# Patient Record
Sex: FEMALE | Race: WHITE | NOT HISPANIC OR LATINO | URBAN - METROPOLITAN AREA
[De-identification: names, ages, dates, MRNs, and addresses within clinical notes are randomized per-mention and may not be internally consistent; named-entity substitution may affect disease eponyms.]

---

## 2017-01-23 ENCOUNTER — EMERGENCY (EMERGENCY)
Facility: HOSPITAL | Age: 67
LOS: 0 days | Discharge: HOME | End: 2017-01-23
Admitting: INTERNAL MEDICINE

## 2017-06-21 ENCOUNTER — OUTPATIENT (OUTPATIENT)
Dept: OUTPATIENT SERVICES | Facility: HOSPITAL | Age: 67
LOS: 1 days | Discharge: HOME | End: 2017-06-21

## 2017-06-27 DIAGNOSIS — R07.89 OTHER CHEST PAIN: ICD-10-CM

## 2017-06-27 DIAGNOSIS — E78.5 HYPERLIPIDEMIA, UNSPECIFIED: ICD-10-CM

## 2017-06-27 DIAGNOSIS — I10 ESSENTIAL (PRIMARY) HYPERTENSION: ICD-10-CM

## 2017-06-27 DIAGNOSIS — E03.9 HYPOTHYROIDISM, UNSPECIFIED: ICD-10-CM

## 2017-06-27 DIAGNOSIS — R56.9 UNSPECIFIED CONVULSIONS: ICD-10-CM

## 2017-06-27 DIAGNOSIS — J45.909 UNSPECIFIED ASTHMA, UNCOMPLICATED: ICD-10-CM

## 2017-06-28 DIAGNOSIS — E04.9 NONTOXIC GOITER, UNSPECIFIED: ICD-10-CM

## 2017-07-15 ENCOUNTER — EMERGENCY (EMERGENCY)
Facility: HOSPITAL | Age: 67
LOS: 0 days | Discharge: HOME | End: 2017-07-15
Admitting: INTERNAL MEDICINE
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
## 2017-07-15 DIAGNOSIS — I10 ESSENTIAL (PRIMARY) HYPERTENSION: ICD-10-CM

## 2017-07-15 DIAGNOSIS — J45.909 UNSPECIFIED ASTHMA, UNCOMPLICATED: ICD-10-CM

## 2017-07-15 DIAGNOSIS — S61.216A LACERATION WITHOUT FOREIGN BODY OF RIGHT LITTLE FINGER WITHOUT DAMAGE TO NAIL, INITIAL ENCOUNTER: ICD-10-CM
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
## 2017-07-15 DIAGNOSIS — Z79.899 OTHER LONG TERM (CURRENT) DRUG THERAPY: ICD-10-CM

## 2019-05-29 ENCOUNTER — RECORD ABSTRACTING (OUTPATIENT)
Age: 69
End: 2019-05-29

## 2019-05-29 DIAGNOSIS — Z87.891 PERSONAL HISTORY OF NICOTINE DEPENDENCE: ICD-10-CM

## 2019-05-29 DIAGNOSIS — Z87.19 PERSONAL HISTORY OF OTHER DISEASES OF THE DIGESTIVE SYSTEM: ICD-10-CM

## 2019-05-29 DIAGNOSIS — Z87.898 PERSONAL HISTORY OF OTHER SPECIFIED CONDITIONS: ICD-10-CM

## 2019-05-29 DIAGNOSIS — Z82.3 FAMILY HISTORY OF STROKE: ICD-10-CM

## 2019-05-29 DIAGNOSIS — Z82.49 FAMILY HISTORY OF ISCHEMIC HEART DISEASE AND OTHER DISEASES OF THE CIRCULATORY SYSTEM: ICD-10-CM

## 2019-05-29 DIAGNOSIS — Z86.39 PERSONAL HISTORY OF OTHER ENDOCRINE, NUTRITIONAL AND METABOLIC DISEASE: ICD-10-CM

## 2019-05-29 DIAGNOSIS — Z83.49 FAMILY HISTORY OF OTHER ENDOCRINE, NUTRITIONAL AND METABOLIC DISEASES: ICD-10-CM

## 2019-05-29 DIAGNOSIS — Z87.39 PERSONAL HISTORY OF OTHER DISEASES OF THE MUSCULOSKELETAL SYSTEM AND CONNECTIVE TISSUE: ICD-10-CM

## 2019-05-29 PROBLEM — Z00.00 ENCOUNTER FOR PREVENTIVE HEALTH EXAMINATION: Status: ACTIVE | Noted: 2019-05-29

## 2019-05-29 RX ORDER — CHROMIUM 200 MCG
1000 TABLET ORAL
Refills: 0 | Status: ACTIVE | COMMUNITY

## 2019-06-08 ENCOUNTER — APPOINTMENT (OUTPATIENT)
Dept: ENDOCRINOLOGY | Facility: CLINIC | Age: 69
End: 2019-06-08
Payer: MEDICARE

## 2019-06-08 VITALS
DIASTOLIC BLOOD PRESSURE: 80 MMHG | OXYGEN SATURATION: 98 % | BODY MASS INDEX: 32.99 KG/M2 | HEART RATE: 80 BPM | HEIGHT: 65 IN | SYSTOLIC BLOOD PRESSURE: 130 MMHG | WEIGHT: 198 LBS

## 2019-06-08 PROCEDURE — 99214 OFFICE O/P EST MOD 30 MIN: CPT

## 2019-06-08 NOTE — PHYSICAL EXAM
[Alert] : alert [Well Nourished] : well nourished [No Acute Distress] : no acute distress [Well Developed] : well developed [EOMI] : extra ocular movement intact [Normal Oropharynx] : the oropharynx was normal [Normal Sclera/Conjunctiva] : normal sclera/conjunctiva [No Proptosis] : no proptosis [Thyroid Not Enlarged] : the thyroid was not enlarged [No Respiratory Distress] : no respiratory distress [No Thyroid Nodules] : there were no palpable thyroid nodules [Clear to Auscultation] : lungs were clear to auscultation bilaterally [No Accessory Muscle Use] : no accessory muscle use [Normal Rate] : heart rate was normal  [Pedal Pulses Normal] : the pedal pulses are present [Normal S1, S2] : normal S1 and S2 [Regular Rhythm] : with a regular rhythm [No Edema] : there was no peripheral edema [Normal Bowel Sounds] : normal bowel sounds [Not Tender] : non-tender [Post Cervical Nodes] : posterior cervical nodes [Soft] : abdomen soft [Not Distended] : not distended [Normal] : normal and non tender [Axillary Nodes] : axillary nodes [Anterior Cervical Nodes] : anterior cervical nodes [Spine Straight] : spine straight [No Stigmata of Cushings Syndrome] : no stigmata of cushings syndrome [No Spinal Tenderness] : no spinal tenderness [No Rash] : no rash [Normal Strength/Tone] : muscle strength and tone were normal [Normal Gait] : normal gait [No Tremors] : no tremors [Normal Reflexes] : deep tendon reflexes were 2+ and symmetric [Oriented x3] : oriented to person, place, and time [Acanthosis Nigricans] : no acanthosis nigricans

## 2019-06-08 NOTE — ASSESSMENT
[Carbohydrate Consistent Diet] : carbohydrate consistent diet [FreeTextEntry1] : Labs fully reviewed . TFTs are normal and pt. clinically euthyroid. \par thyroid sono on 5/29/2019 showed L dominant nodule did increase in size.Increased Tg noted and discussed with diet/ exercise counseling.

## 2019-06-08 NOTE — REASON FOR VISIT
[Follow-Up: _____] : a [unfilled] follow-up visit [FreeTextEntry1] : Follow up for blood work and thyroid sonogram.

## 2019-06-12 ENCOUNTER — RESULT REVIEW (OUTPATIENT)
Age: 69
End: 2019-06-12

## 2019-06-12 ENCOUNTER — OUTPATIENT (OUTPATIENT)
Dept: OUTPATIENT SERVICES | Facility: HOSPITAL | Age: 69
LOS: 1 days | Discharge: HOME | End: 2019-06-12

## 2019-06-13 DIAGNOSIS — E04.9 NONTOXIC GOITER, UNSPECIFIED: ICD-10-CM

## 2019-08-23 ENCOUNTER — EMERGENCY (EMERGENCY)
Facility: HOSPITAL | Age: 69
LOS: 0 days | Discharge: HOME | End: 2019-08-23
Attending: EMERGENCY MEDICINE | Admitting: EMERGENCY MEDICINE
Payer: MEDICARE

## 2019-08-23 VITALS
HEART RATE: 80 BPM | SYSTOLIC BLOOD PRESSURE: 166 MMHG | TEMPERATURE: 97 F | OXYGEN SATURATION: 96 % | RESPIRATION RATE: 18 BRPM | DIASTOLIC BLOOD PRESSURE: 86 MMHG

## 2019-08-23 DIAGNOSIS — R68.84 JAW PAIN: ICD-10-CM

## 2019-08-23 DIAGNOSIS — M26.621 ARTHRALGIA OF RIGHT TEMPOROMANDIBULAR JOINT: ICD-10-CM

## 2019-08-23 PROCEDURE — 70486 CT MAXILLOFACIAL W/O DYE: CPT | Mod: 26

## 2019-08-23 PROCEDURE — 99284 EMERGENCY DEPT VISIT MOD MDM: CPT

## 2019-08-23 RX ORDER — METHOCARBAMOL 500 MG/1
2 TABLET, FILM COATED ORAL
Qty: 80 | Refills: 0
Start: 2019-08-23 | End: 2019-09-01

## 2019-08-23 RX ORDER — IBUPROFEN 200 MG
600 TABLET ORAL ONCE
Refills: 0 | Status: COMPLETED | OUTPATIENT
Start: 2019-08-23 | End: 2019-08-23

## 2019-08-23 RX ORDER — METHOCARBAMOL 500 MG/1
1000 TABLET, FILM COATED ORAL ONCE
Refills: 0 | Status: DISCONTINUED | OUTPATIENT
Start: 2019-08-23 | End: 2019-08-23

## 2019-08-23 RX ADMIN — Medication 600 MILLIGRAM(S): at 13:17

## 2019-08-23 NOTE — ED PROVIDER NOTE - CLINICAL SUMMARY MEDICAL DECISION MAKING FREE TEXT BOX
70 y/o female presented with right TMJ pain, no trauma. CT results obtained and discussed with pt. Pt instructed to take NSAIDS, warm compresses, eat soft meals and f/up with PMFS.

## 2019-08-23 NOTE — ED PROVIDER NOTE - NS ED ROS FT
Review of Systems:  	•	CONSTITUTIONAL - no fever, no diaphoresis, no chills  	•	SKIN - no rash  	•	HEMATOLOGIC - no bleeding, no bruising  	•	EYES - no eye pain, no blurry vision  	•	ENT - no change in hearing, no sore throat, no ear pain or tinnitus  	•	RESPIRATORY - no shortness of breath, no cough  	•	CARDIAC - no chest pain, no palpitations  	•	GI - no abd pain, no nausea, no vomiting, no diarrhea, no constipation  	•	GENITO-URINARY - no discharge, no dysuria; no hematuria, no increased urinary frequency  	•	MUSCULOSKELETAL - + jaw pain  	•	NEUROLOGIC - no weakness, no headache, no paresthesias, no LOC  	•	PSYCH - no anxiety, non suicidal, non homicidal, no hallucination, no depression

## 2019-08-23 NOTE — ED PROVIDER NOTE - CARE PROVIDER_API CALL
Charlene Conklin (DDS)  [place] [address]  Phone: [phone]  Fax: [phone]  Follow Up Time:

## 2019-08-23 NOTE — ED PROVIDER NOTE - ATTENDING CONTRIBUTION TO CARE
I personally evaluated the patient. I reviewed the Resident’s or Physician Assistant’s note (as assigned above), and agree with the findings and plan except as documented in my note.    Pt is a 68 y/o female with hx of HTN, hashimotos who presents to ED for right sided TMJ pain. Sx's started last night after yawning, has been intermittent, worse with movement of jaw. No trauma. Pt still able to move jaw around but unable to fully close teeth.    Constitutional: Well developed, well nourished. NAD.  Head: Normocephalic.  Eyes: EOMI.  ENT: + right TMJ tenderness. No malocclusion. Pt can open and close jaw and move it left and right.   Cardiovascular: Normal S1, S2. Regular rate and rhythm. No murmurs, rubs, or gallops.  Pulmonary: Normal respiratory rate and effort. Lungs clear to auscultation bilaterally. No wheezing, rales, or rhonchi.  Neuro: No focal neurological deficits.

## 2019-08-23 NOTE — ED PROVIDER NOTE - OBJECTIVE STATEMENT
69 year old female with pmhx noted presents with right sided jaw pain since yesterday. pt admits pain began after yawning yesterday, went away, and then came back today after eating meal. pt admits feels like her jaw is out of place. no HA, dizziness, fever, chills, difficulty swallowing, CP or SOB.

## 2019-08-23 NOTE — ED PROVIDER NOTE - PHYSICAL EXAMINATION
CONST: Well appearing in NAD  EYES: PERRL, EOMI, Sclera and conjunctiva clear.   ENT: + tenderness to right TMJ, No nasal discharge. TM's clear B/L without drainage. Oropharynx normal appearing, no erythema or exudates. No abscess or swelling. Uvula midline. No temporal artery or mastoid tenderness.  NECK: Non-tender, no meningeal signs. normal ROM. supple   CARD: Normal S1 S2; Normal rate and rhythm  RESP: Equal BS B/L, No wheezes, rhonchi or rales. No distress  GI: Soft, non-tender, non-distended. no cva tenderness. normal BS  MS: Normal ROM in all extremities. No midline spinal tenderness. pulses 2 +. no calf tenderness or swelling  SKIN: Warm, dry, no acute rashes. Good turgor

## 2019-12-14 ENCOUNTER — APPOINTMENT (OUTPATIENT)
Dept: ENDOCRINOLOGY | Facility: CLINIC | Age: 69
End: 2019-12-14
Payer: MEDICARE

## 2019-12-14 VITALS
WEIGHT: 200 LBS | DIASTOLIC BLOOD PRESSURE: 80 MMHG | BODY MASS INDEX: 33.32 KG/M2 | HEIGHT: 65 IN | HEART RATE: 84 BPM | SYSTOLIC BLOOD PRESSURE: 140 MMHG

## 2019-12-14 VITALS — OXYGEN SATURATION: 97 %

## 2019-12-14 PROCEDURE — 99214 OFFICE O/P EST MOD 30 MIN: CPT

## 2019-12-14 RX ORDER — ALPRAZOLAM 2 MG/1
TABLET ORAL
Refills: 0 | Status: DISCONTINUED | COMMUNITY
End: 2019-12-14

## 2019-12-14 RX ORDER — LOSARTAN POTASSIUM 50 MG/1
50 TABLET, FILM COATED ORAL
Qty: 180 | Refills: 0 | Status: DISCONTINUED | COMMUNITY
End: 2019-12-14

## 2019-12-14 RX ORDER — DILTIAZEM HYDROCHLORIDE 300 MG/1
300 CAPSULE, EXTENDED RELEASE ORAL
Qty: 90 | Refills: 0 | Status: DISCONTINUED | COMMUNITY
Start: 2018-12-08 | End: 2019-12-14

## 2019-12-14 NOTE — PHYSICAL EXAM
[No Acute Distress] : no acute distress [Alert] : alert [Well Nourished] : well nourished [Well Developed] : well developed [EOMI] : extra ocular movement intact [No Proptosis] : no proptosis [Normal Sclera/Conjunctiva] : normal sclera/conjunctiva [No Respiratory Distress] : no respiratory distress [Thyroid Not Enlarged] : the thyroid was not enlarged [Normal Oropharynx] : the oropharynx was normal [No Thyroid Nodules] : there were no palpable thyroid nodules [No Accessory Muscle Use] : no accessory muscle use [Clear to Auscultation] : lungs were clear to auscultation bilaterally [Normal S1, S2] : normal S1 and S2 [Pedal Pulses Normal] : the pedal pulses are present [Regular Rhythm] : with a regular rhythm [Normal Rate] : heart rate was normal  [Normal Bowel Sounds] : normal bowel sounds [Not Tender] : non-tender [No Edema] : there was no peripheral edema [Not Distended] : not distended [Post Cervical Nodes] : posterior cervical nodes [Soft] : abdomen soft [Axillary Nodes] : axillary nodes [Normal] : normal and non tender [Anterior Cervical Nodes] : anterior cervical nodes [No Stigmata of Cushings Syndrome] : no stigmata of cushings syndrome [No Spinal Tenderness] : no spinal tenderness [Spine Straight] : spine straight [Normal Strength/Tone] : muscle strength and tone were normal [No Rash] : no rash [Normal Gait] : normal gait [No Tremors] : no tremors [Normal Reflexes] : deep tendon reflexes were 2+ and symmetric [Oriented x3] : oriented to person, place, and time [Acanthosis Nigricans] : no acanthosis nigricans

## 2019-12-14 NOTE — ASSESSMENT
[FreeTextEntry1] :  Pt currently with no change in thyroid nodules noted on physical. Last U/S and FNA  (6/19) were reviewed and there is no evidence of malignancy. Clinically pt. is euthyroid. Labs reviewed with pt. and there was long and full discussion re: risks associated with thyroid cancer. \par We have reviewed the TFTs and symptoms associated with hyper and hypothyroidism and pt. is comfortable with current regimen of meds. \par Pt. has a low TSH but normal T4 and T3. There are no signs or symptoms of hyperthyroidism. The risks of subclinical hyperthyroidism (BMD, cardiac effects, etc.) have been fully reviewed and pt. wishes to stay with current dose\par

## 2020-01-01 ENCOUNTER — INPATIENT (INPATIENT)
Facility: HOSPITAL | Age: 70
LOS: 5 days | Discharge: HOME | End: 2020-01-07
Attending: INTERNAL MEDICINE | Admitting: INTERNAL MEDICINE
Payer: MEDICARE

## 2020-01-01 VITALS
RESPIRATION RATE: 20 BRPM | WEIGHT: 169.98 LBS | DIASTOLIC BLOOD PRESSURE: 93 MMHG | TEMPERATURE: 98 F | SYSTOLIC BLOOD PRESSURE: 141 MMHG | HEART RATE: 104 BPM | OXYGEN SATURATION: 94 %

## 2020-01-01 DIAGNOSIS — I26.99 OTHER PULMONARY EMBOLISM WITHOUT ACUTE COR PULMONALE: ICD-10-CM

## 2020-01-01 LAB
ALBUMIN SERPL ELPH-MCNC: 4.1 G/DL — SIGNIFICANT CHANGE UP (ref 3.5–5.2)
ALP SERPL-CCNC: 83 U/L — SIGNIFICANT CHANGE UP (ref 30–115)
ALT FLD-CCNC: 13 U/L — SIGNIFICANT CHANGE UP (ref 0–41)
ANION GAP SERPL CALC-SCNC: 17 MMOL/L — HIGH (ref 7–14)
APTT BLD: 36.4 SEC — SIGNIFICANT CHANGE UP (ref 27–39.2)
AST SERPL-CCNC: 19 U/L — SIGNIFICANT CHANGE UP (ref 0–41)
BILIRUB SERPL-MCNC: 0.3 MG/DL — SIGNIFICANT CHANGE UP (ref 0.2–1.2)
BUN SERPL-MCNC: 10 MG/DL — SIGNIFICANT CHANGE UP (ref 10–20)
CALCIUM SERPL-MCNC: 9.6 MG/DL — SIGNIFICANT CHANGE UP (ref 8.5–10.1)
CHLORIDE SERPL-SCNC: 105 MMOL/L — SIGNIFICANT CHANGE UP (ref 98–110)
CO2 SERPL-SCNC: 20 MMOL/L — SIGNIFICANT CHANGE UP (ref 17–32)
CREAT SERPL-MCNC: 0.9 MG/DL — SIGNIFICANT CHANGE UP (ref 0.7–1.5)
GLUCOSE SERPL-MCNC: 96 MG/DL — SIGNIFICANT CHANGE UP (ref 70–99)
HCT VFR BLD CALC: 36.6 % — LOW (ref 37–47)
HGB BLD-MCNC: 11.5 G/DL — LOW (ref 12–16)
INR BLD: 1.15 RATIO — SIGNIFICANT CHANGE UP (ref 0.65–1.3)
MCHC RBC-ENTMCNC: 27.9 PG — SIGNIFICANT CHANGE UP (ref 27–31)
MCHC RBC-ENTMCNC: 31.4 G/DL — LOW (ref 32–37)
MCV RBC AUTO: 88.8 FL — SIGNIFICANT CHANGE UP (ref 81–99)
NRBC # BLD: 0 /100 WBCS — SIGNIFICANT CHANGE UP (ref 0–0)
NT-PROBNP SERPL-SCNC: 109 PG/ML — SIGNIFICANT CHANGE UP (ref 0–300)
PLATELET # BLD AUTO: 324 K/UL — SIGNIFICANT CHANGE UP (ref 130–400)
POTASSIUM SERPL-MCNC: 3.6 MMOL/L — SIGNIFICANT CHANGE UP (ref 3.5–5)
POTASSIUM SERPL-SCNC: 3.6 MMOL/L — SIGNIFICANT CHANGE UP (ref 3.5–5)
PROT SERPL-MCNC: 6.7 G/DL — SIGNIFICANT CHANGE UP (ref 6–8)
PROTHROM AB SERPL-ACNC: 13.2 SEC — HIGH (ref 9.95–12.87)
RBC # BLD: 4.12 M/UL — LOW (ref 4.2–5.4)
RBC # FLD: 12.7 % — SIGNIFICANT CHANGE UP (ref 11.5–14.5)
SODIUM SERPL-SCNC: 142 MMOL/L — SIGNIFICANT CHANGE UP (ref 135–146)
TROPONIN T SERPL-MCNC: <0.01 NG/ML — SIGNIFICANT CHANGE UP
WBC # BLD: 8.8 K/UL — SIGNIFICANT CHANGE UP (ref 4.8–10.8)
WBC # FLD AUTO: 8.8 K/UL — SIGNIFICANT CHANGE UP (ref 4.8–10.8)

## 2020-01-01 PROCEDURE — 93010 ELECTROCARDIOGRAM REPORT: CPT

## 2020-01-01 PROCEDURE — 71275 CT ANGIOGRAPHY CHEST: CPT | Mod: 26

## 2020-01-01 PROCEDURE — 71045 X-RAY EXAM CHEST 1 VIEW: CPT | Mod: 26

## 2020-01-01 PROCEDURE — 99291 CRITICAL CARE FIRST HOUR: CPT

## 2020-01-01 RX ORDER — LOSARTAN POTASSIUM 100 MG/1
100 TABLET, FILM COATED ORAL DAILY
Refills: 0 | Status: DISCONTINUED | OUTPATIENT
Start: 2020-01-01 | End: 2020-01-07

## 2020-01-01 RX ORDER — MONTELUKAST 4 MG/1
10 TABLET, CHEWABLE ORAL DAILY
Refills: 0 | Status: DISCONTINUED | OUTPATIENT
Start: 2020-01-01 | End: 2020-01-07

## 2020-01-01 RX ORDER — DILTIAZEM HCL 120 MG
300 CAPSULE, EXT RELEASE 24 HR ORAL DAILY
Refills: 0 | Status: DISCONTINUED | OUTPATIENT
Start: 2020-01-01 | End: 2020-01-07

## 2020-01-01 RX ORDER — FUROSEMIDE 40 MG
20 TABLET ORAL DAILY
Refills: 0 | Status: DISCONTINUED | OUTPATIENT
Start: 2020-01-01 | End: 2020-01-02

## 2020-01-01 RX ORDER — CHLORHEXIDINE GLUCONATE 213 G/1000ML
1 SOLUTION TOPICAL
Refills: 0 | Status: DISCONTINUED | OUTPATIENT
Start: 2020-01-01 | End: 2020-01-07

## 2020-01-01 RX ORDER — HEPARIN SODIUM 5000 [USP'U]/ML
INJECTION INTRAVENOUS; SUBCUTANEOUS
Qty: 25000 | Refills: 0 | Status: DISCONTINUED | OUTPATIENT
Start: 2020-01-01 | End: 2020-01-02

## 2020-01-01 RX ORDER — HEPARIN SODIUM 5000 [USP'U]/ML
4000 INJECTION INTRAVENOUS; SUBCUTANEOUS ONCE
Refills: 0 | Status: COMPLETED | OUTPATIENT
Start: 2020-01-01 | End: 2020-01-01

## 2020-01-01 RX ORDER — LEVOTHYROXINE SODIUM 125 MCG
150 TABLET ORAL DAILY
Refills: 0 | Status: DISCONTINUED | OUTPATIENT
Start: 2020-01-01 | End: 2020-01-07

## 2020-01-01 RX ORDER — LANOLIN ALCOHOL/MO/W.PET/CERES
5 CREAM (GRAM) TOPICAL AT BEDTIME
Refills: 0 | Status: DISCONTINUED | OUTPATIENT
Start: 2020-01-01 | End: 2020-01-07

## 2020-01-01 RX ADMIN — HEPARIN SODIUM 4000 UNIT(S): 5000 INJECTION INTRAVENOUS; SUBCUTANEOUS at 19:54

## 2020-01-01 RX ADMIN — HEPARIN SODIUM 1400 UNIT(S)/HR: 5000 INJECTION INTRAVENOUS; SUBCUTANEOUS at 19:54

## 2020-01-01 RX ADMIN — MONTELUKAST 10 MILLIGRAM(S): 4 TABLET, CHEWABLE ORAL at 23:16

## 2020-01-01 RX ADMIN — Medication 300 MILLIGRAM(S): at 22:59

## 2020-01-01 NOTE — ED PROVIDER NOTE - ATTENDING CONTRIBUTION TO CARE
69 year old female, pmhx htn, dld, comes in with sob, patient evaluated as an OP and sent in for further evaluation, patient had recent travel, + worsnee dby inspiration, no chest pain, + mild tightnes son deep inspiration, no loc, no n/v/d    CONSTITUTIONAL: Well-developed; well-nourished; in no acute distress. Sitting up and providing appropriate history and physical examination  SKIN: skin exam is warm and dry, no acute rash.  HEAD: Normocephalic; atraumatic.  EYES: PERRL, 3 mm bilateral, no nystagmus, EOM intact; conjunctiva and sclera clear.  ENT: No nasal discharge; airway clear.  NECK: Supple; non tender. + full passive ROM in all directions. No JVD  CARD: S1, S2 normal; no murmurs, gallops, or rubs. Regular rate and rhythm. + Symmetric Strong Pulses  RESP: No wheezes, rales or rhonchi. Good air movement bilaterally  ABD: soft; non-distended; non-tender. No Rebound, No Guarding, No signs of peritonitis, No CVA tenderness. No pulsatile abdominal mass. + Strong and Symmetric Pulses  EXT: Normal ROM. No clubbing, cyanosis or edema. Dp and Pt Pulses intact. Cap refill less than 3 seconds  NEURO: CN 2-12 intact, normal finger to nose, normal romberg, stable gait, no sensory or motor deficits, Alert, oriented, grossly unremarkable. No Focal deficits. GCS 15. NIH 0  PSYCH: Cooperative, appropriate.

## 2020-01-01 NOTE — ED ADULT NURSE NOTE - CHIEF COMPLAINT QUOTE
pt c/o weakness and sob. went to [facility] yesterday and sent here for poss blood clot. pt having swelling of the legs with more swelling in the left

## 2020-01-01 NOTE — ED PROVIDER NOTE - NS ED ROS FT
Constitutional: (-) fever  Eyes/ENT: (-) visual changes   Cardiovascular: (-) chest pain, (-) syncope  Respiratory: (+) cough, (+) shortness of breath  Gastrointestinal: (-) vomiting, (-) diarrhea  Genitourinary: (-) dysuria, (-) hesitancy, (-) frequency   Musculoskeletal: (-) neck pain, (-) back pain, (-) joint pain  Integumentary: (-) rash, (-) edema  Neurological: (-) headache, (-) altered mental status  Allergic/Immunologic: (-) pruritus

## 2020-01-01 NOTE — ED ADULT TRIAGE NOTE - CHIEF COMPLAINT QUOTE
pt c/o weakness and sob. went to [facility] [place] yesterday and sent here for poss blood clot. pt c/o weakness and sob. went to [facility] yesterday and sent here for poss blood clot. pt having swelling of the legs with more swelling in the left

## 2020-01-01 NOTE — ED ADULT NURSE REASSESSMENT NOTE - NS ED NURSE REASSESS COMMENT FT1
Received pt from previous RN A/O times 4 Vs stable  placed on cardiac monitor , C./O SOB on exertion comfort provide 2LNCO2 is given SPO2 96% -98% on O2 . HOB elevated 35 degree Ed attending made aware of pt status assistance provide safety precaution on progress  waiting for CTA on going nursing observation .

## 2020-01-01 NOTE — H&P ADULT - ASSESSMENT
69 year old female with Hashimoto's thyroiditis, HTN presenting due to shortness of breath of 5 days duration.    #Pulmonary embolism, right main pulmonary artery  On Heparin drip, f/u PTT  Right heart strain, RV/LV ratio 1.4  Troponin, BNP wnl  Discussed with IR, possible thrombectomy in AM  NPO after midnight  Duplex lower extremity  TTE  CT with left upper lobe opacity, differential of hemorrhage vs infectious process  No clear symptoms of pneumonia, no fever, no WBC count. Will hold off antibiotics    #HTN  BP on the higher side   Continue Cardizem 300mg qd, Losartan 100mg qd, Lasix 20mg qd     #Hypothyroidism  Levothyroxine 150mcg    #Diet: DASH, NPO after midnight  #DVT PPX: On Heparin drip  #GI PPX: Not indicated  #Dispo: From home  #Activity: bedrest for now  #Full code

## 2020-01-01 NOTE — ED PROVIDER NOTE - OBJECTIVE STATEMENT
70 yo female with a pmh of HTN, scoliosis, and hypothyroid presents with SOB for one week. SOB is worsened on exertion, cough, and she also notes lower extremity edema. she denies any other symptoms including fevers, chill, headache, recent illness/travel, abdominal pain, or chest pain.

## 2020-01-01 NOTE — ED PROVIDER NOTE - PHYSICAL EXAMINATION
Gen: NAD, AOx3  Head: NCAT  HEENT: PERRL, oral mucosa moist, normal conjunctiva, oropharynx clear without exudate or erythema  Lung: CTAB, use of accessory muscle while breathing, no wheezing, rales, rhonchi  CV: normal s1/s2, rrr, Normal perfusion, pulses 2+ throughout  Abd: soft, NTND, no CVA tenderness  Genitourinary: no pelvic tenderness  MSK: No edema, no visible deformities, full range of motion in all 4 extremities  Neuro: No focal neurologic deficits  Skin: No rash   Psych: normal affect

## 2020-01-01 NOTE — H&P ADULT - NSHPLABSRESULTS_GEN_ALL_CORE
11.5   8.80  )-----------( 324      ( 01 Jan 2020 13:40 )             36.6           01-01    142  |  105  |  10  ----------------------------<  96  3.6   |  20  |  0.9    Ca    9.6      01 Jan 2020 13:40    TPro  6.7  /  Alb  4.1  /  TBili  0.3  /  DBili  x   /  AST  19  /  ALT  13  /  AlkPhos  83  01-01            < from: 12 Lead ECG (01.01.20 @ 13:42) >      Ventricular Rate 83 BPM    Atrial Rate 83 BPM    P-R Interval 146 ms    QRS Duration 82 ms    Q-T Interval 352 ms    QTC Calculation(Bezet) 413 ms    P Axis 40 degrees    R Axis 35 degrees    T Axis 95 degrees    Diagnosis Line Normal sinus rhythm  Nonspecific T wave abnormality  Abnormal ECG    Confirmed by YVONNE PARHAM MD ([id number]) on 1/1/2020 3:27:27 PM    < end of copied text >          < from: CT Angio Chest w/ IV Cont (01.01.20 @ 17:44) >    IMPRESSION:  1.  Acute pulmonary emboli in the right main pulmonary artery with extension into all lobar branches as well as multiple bilateral segmental pulmonary emboli; there is CT evidence of right heart strain.    2.  Central left upper lobe airspace opacity and additional nodular superior segment left lower lobe opacities may represent areas of ischemic pulmonary hemorrhage versus infarction; other infectious/inflammatory processes are alsoin the differential.    3.  Indeterminate 2.8 cm left thyroid lobe nodule. Further evaluation with a outpatient thyroid ultrasound is recommended, if not previously for interrogated.    Acute findings were discussed with Dr. Kapoor in ED x [phone] at 5:59PM on January 1, 2020.      < end of copied text >

## 2020-01-01 NOTE — H&P ADULT - ATTENDING COMMENTS
69 year old female with Hashimoto's thyroiditis, HTN presenting due to shortness of breath admitted to ICU with acute B/L Pulmonary embolism    Pt seen and examined in ICU earlier today- complains only of HA; no dyspnea at rest    Spoke with RN    chart reviewed- agree with above    anticoagulation    vascular eval for possible thrombectomy    pulm eval    O2 as needed    plan as per ICU/pulm team

## 2020-01-01 NOTE — ED PROVIDER NOTE - CLINICAL SUMMARY MEDICAL DECISION MAKING FREE TEXT BOX
I personally evaluated the patient. I reviewed the Resident’s or Physician Assistant’s note (as assigned above), and agree with the findings and plan except as documented in my note. Patient evaluated for pleuritic sob, patient admitted to icu for PE with right heart strain requiring anticoagulation. IR consulted and are on board

## 2020-01-01 NOTE — H&P ADULT - HISTORY OF PRESENT ILLNESS
69 year old female with Hashimoto's thyroiditis, HTN presenting due to shortness of breath of 5 days duration. For that period of time patient reports feeling tired and short of breath on walking even small distances, she also has some non-productive cough when she takes a deep breath. She has chronic leg swelling left worse than right. No recent travel.  Denies fevers, chills, chest pain, abdominal pain.

## 2020-01-01 NOTE — H&P ADULT - NSHPPHYSICALEXAM_GEN_ALL_CORE
PHYSICAL EXAM:T(C): 36.9 (01-01-20 @ 20:07), Max: 36.9 (01-01-20 @ 20:07)  HR: 95 (01-01-20 @ 20:07) (84 - 104)  BP: 157/95 (01-01-20 @ 20:07) (130/81 - 157/95)  RR: 20 (01-01-20 @ 20:07) (20 - 20)  SpO2: 96% (01-01-20 @ 20:07) (94% - 97%)  GENERAL: On nasal cannula  HEAD:  Atraumatic, Normocephalic  EYES: EOMI, PERRLA, conjunctiva and sclera clear  NECK: Supple, No JVD  CHEST/LUNG: Clear to auscultation bilaterally; No wheeze  HEART: Regular rate and rhythm; No murmurs, rubs, or gallops  ABDOMEN: Soft, Nontender, Nondistended; Bowel sounds present  EXTREMITIES:  2+ Peripheral Pulses, No clubbing, cyanosis, or edema  PSYCH: AAOx3  NEUROLOGY: non-focal  SKIN: No rashes or lesions

## 2020-01-02 LAB
ANION GAP SERPL CALC-SCNC: 14 MMOL/L — SIGNIFICANT CHANGE UP (ref 7–14)
APTT BLD: 119.6 SEC — CRITICAL HIGH (ref 27–39.2)
APTT BLD: 178.9 SEC — CRITICAL HIGH (ref 27–39.2)
APTT BLD: 64.1 SEC — HIGH (ref 27–39.2)
APTT BLD: 96.6 SEC — CRITICAL HIGH (ref 27–39.2)
BASOPHILS # BLD AUTO: 0.08 K/UL — SIGNIFICANT CHANGE UP (ref 0–0.2)
BASOPHILS NFR BLD AUTO: 0.9 % — SIGNIFICANT CHANGE UP (ref 0–1)
BUN SERPL-MCNC: 10 MG/DL — SIGNIFICANT CHANGE UP (ref 10–20)
CALCIUM SERPL-MCNC: 9.2 MG/DL — SIGNIFICANT CHANGE UP (ref 8.5–10.1)
CHLORIDE SERPL-SCNC: 106 MMOL/L — SIGNIFICANT CHANGE UP (ref 98–110)
CO2 SERPL-SCNC: 23 MMOL/L — SIGNIFICANT CHANGE UP (ref 17–32)
CREAT SERPL-MCNC: 0.9 MG/DL — SIGNIFICANT CHANGE UP (ref 0.7–1.5)
EOSINOPHIL # BLD AUTO: 0.13 K/UL — SIGNIFICANT CHANGE UP (ref 0–0.7)
EOSINOPHIL NFR BLD AUTO: 1.5 % — SIGNIFICANT CHANGE UP (ref 0–8)
GLUCOSE SERPL-MCNC: 109 MG/DL — HIGH (ref 70–99)
HCT VFR BLD CALC: 33 % — LOW (ref 37–47)
HGB BLD-MCNC: 10.3 G/DL — LOW (ref 12–16)
IMM GRANULOCYTES NFR BLD AUTO: 0.3 % — SIGNIFICANT CHANGE UP (ref 0.1–0.3)
INR BLD: 1.35 RATIO — HIGH (ref 0.65–1.3)
LYMPHOCYTES # BLD AUTO: 2.18 K/UL — SIGNIFICANT CHANGE UP (ref 1.2–3.4)
LYMPHOCYTES # BLD AUTO: 25.1 % — SIGNIFICANT CHANGE UP (ref 20.5–51.1)
MCHC RBC-ENTMCNC: 27.8 PG — SIGNIFICANT CHANGE UP (ref 27–31)
MCHC RBC-ENTMCNC: 31.2 G/DL — LOW (ref 32–37)
MCV RBC AUTO: 89.2 FL — SIGNIFICANT CHANGE UP (ref 81–99)
MONOCYTES # BLD AUTO: 1.15 K/UL — HIGH (ref 0.1–0.6)
MONOCYTES NFR BLD AUTO: 13.2 % — HIGH (ref 1.7–9.3)
NEUTROPHILS # BLD AUTO: 5.11 K/UL — SIGNIFICANT CHANGE UP (ref 1.4–6.5)
NEUTROPHILS NFR BLD AUTO: 59 % — SIGNIFICANT CHANGE UP (ref 42.2–75.2)
NRBC # BLD: 0 /100 WBCS — SIGNIFICANT CHANGE UP (ref 0–0)
PLATELET # BLD AUTO: 283 K/UL — SIGNIFICANT CHANGE UP (ref 130–400)
POTASSIUM SERPL-MCNC: 4.1 MMOL/L — SIGNIFICANT CHANGE UP (ref 3.5–5)
POTASSIUM SERPL-SCNC: 4.1 MMOL/L — SIGNIFICANT CHANGE UP (ref 3.5–5)
PROTHROM AB SERPL-ACNC: 15.5 SEC — HIGH (ref 9.95–12.87)
RBC # BLD: 3.7 M/UL — LOW (ref 4.2–5.4)
RBC # FLD: 12.8 % — SIGNIFICANT CHANGE UP (ref 11.5–14.5)
SODIUM SERPL-SCNC: 143 MMOL/L — SIGNIFICANT CHANGE UP (ref 135–146)
TROPONIN T SERPL-MCNC: <0.01 NG/ML — SIGNIFICANT CHANGE UP
TROPONIN T SERPL-MCNC: <0.01 NG/ML — SIGNIFICANT CHANGE UP
WBC # BLD: 8.68 K/UL — SIGNIFICANT CHANGE UP (ref 4.8–10.8)
WBC # FLD AUTO: 8.68 K/UL — SIGNIFICANT CHANGE UP (ref 4.8–10.8)

## 2020-01-02 PROCEDURE — 93970 EXTREMITY STUDY: CPT | Mod: 26

## 2020-01-02 PROCEDURE — 71045 X-RAY EXAM CHEST 1 VIEW: CPT | Mod: 26

## 2020-01-02 PROCEDURE — 93306 TTE W/DOPPLER COMPLETE: CPT | Mod: 26

## 2020-01-02 RX ORDER — HEPARIN SODIUM 5000 [USP'U]/ML
1100 INJECTION INTRAVENOUS; SUBCUTANEOUS
Qty: 25000 | Refills: 0 | Status: DISCONTINUED | OUTPATIENT
Start: 2020-01-02 | End: 2020-01-02

## 2020-01-02 RX ORDER — PANTOPRAZOLE SODIUM 20 MG/1
40 TABLET, DELAYED RELEASE ORAL
Refills: 0 | Status: DISCONTINUED | OUTPATIENT
Start: 2020-01-02 | End: 2020-01-07

## 2020-01-02 RX ORDER — HEPARIN SODIUM 5000 [USP'U]/ML
700 INJECTION INTRAVENOUS; SUBCUTANEOUS
Qty: 25000 | Refills: 0 | Status: DISCONTINUED | OUTPATIENT
Start: 2020-01-02 | End: 2020-01-02

## 2020-01-02 RX ORDER — MECLIZINE HCL 12.5 MG
12.5 TABLET ORAL
Refills: 0 | Status: DISCONTINUED | OUTPATIENT
Start: 2020-01-02 | End: 2020-01-07

## 2020-01-02 RX ORDER — ACETAMINOPHEN 500 MG
650 TABLET ORAL ONCE
Refills: 0 | Status: COMPLETED | OUTPATIENT
Start: 2020-01-02 | End: 2020-01-02

## 2020-01-02 RX ORDER — HEPARIN SODIUM 5000 [USP'U]/ML
800 INJECTION INTRAVENOUS; SUBCUTANEOUS
Qty: 25000 | Refills: 0 | Status: DISCONTINUED | OUTPATIENT
Start: 2020-01-02 | End: 2020-01-02

## 2020-01-02 RX ORDER — HEPARIN SODIUM 5000 [USP'U]/ML
1000 INJECTION INTRAVENOUS; SUBCUTANEOUS
Qty: 25000 | Refills: 0 | Status: DISCONTINUED | OUTPATIENT
Start: 2020-01-02 | End: 2020-01-02

## 2020-01-02 RX ORDER — APIXABAN 2.5 MG/1
10 TABLET, FILM COATED ORAL EVERY 12 HOURS
Refills: 0 | Status: DISCONTINUED | OUTPATIENT
Start: 2020-01-02 | End: 2020-01-07

## 2020-01-02 RX ADMIN — PANTOPRAZOLE SODIUM 40 MILLIGRAM(S): 20 TABLET, DELAYED RELEASE ORAL at 06:29

## 2020-01-02 RX ADMIN — MONTELUKAST 10 MILLIGRAM(S): 4 TABLET, CHEWABLE ORAL at 21:40

## 2020-01-02 RX ADMIN — HEPARIN SODIUM 11 UNIT(S)/HR: 5000 INJECTION INTRAVENOUS; SUBCUTANEOUS at 05:01

## 2020-01-02 RX ADMIN — Medication 20 MILLIGRAM(S): at 05:00

## 2020-01-02 RX ADMIN — APIXABAN 10 MILLIGRAM(S): 2.5 TABLET, FILM COATED ORAL at 17:59

## 2020-01-02 RX ADMIN — Medication 650 MILLIGRAM(S): at 05:30

## 2020-01-02 RX ADMIN — Medication 300 MILLIGRAM(S): at 21:40

## 2020-01-02 RX ADMIN — Medication 5 MILLIGRAM(S): at 00:12

## 2020-01-02 RX ADMIN — LOSARTAN POTASSIUM 100 MILLIGRAM(S): 100 TABLET, FILM COATED ORAL at 05:00

## 2020-01-02 RX ADMIN — Medication 650 MILLIGRAM(S): at 05:00

## 2020-01-02 RX ADMIN — Medication 150 MICROGRAM(S): at 05:00

## 2020-01-02 RX ADMIN — Medication 5 MILLIGRAM(S): at 21:40

## 2020-01-02 NOTE — CONSULT NOTE ADULT - SUBJECTIVE AND OBJECTIVE BOX
INTERVENTIONAL RADIOLOGY CONSULT:     Procedure Requested: PE lysis    HPI:  69 year old female with Hashimoto's thyroiditis, HTN presenting due to shortness of breath of 5 days duration. For that period of time patient reports feeling tired and short of breath on walking even small distances, she also has some non-productive cough when she takes a deep breath. She has chronic leg swelling left worse than right. No recent travel.  Denies fevers, chills, chest pain, abdominal pain. (01 Jan 2020 21:35)      PAST MEDICAL & SURGICAL HISTORY:  Hypertension  H/O Hashimoto thyroiditis      MEDICATIONS  (STANDING):  chlorhexidine 4% Liquid 1 Application(s) Topical <User Schedule>  diltiazem    milliGRAM(s) Oral daily  heparin  Infusion 1100 Unit(s)/Hr (11 mL/Hr) IV Continuous <Continuous>  levothyroxine 150 MICROGram(s) Oral daily  losartan 100 milliGRAM(s) Oral daily  melatonin 5 milliGRAM(s) Oral at bedtime  montelukast 10 milliGRAM(s) Oral daily  pantoprazole    Tablet 40 milliGRAM(s) Oral before breakfast    MEDICATIONS  (PRN):      Allergies    No Known Allergies    Intolerances        Social History:   Smoking: Yes [ ]  No [ ]   ______pk yrs  ETOH  Yes [ ]  No [ ]  Social [ ]  DRUGS:  Yes [ ]  No [ ]  if so what______________    FAMILY HISTORY:  Family history of stroke: Mother      Physical Exam:   Vital Signs Last 24 Hrs  T(C): 36.7 (02 Jan 2020 08:00), Max: 37 (01 Jan 2020 21:10)  T(F): 98.1 (02 Jan 2020 08:00), Max: 98.6 (01 Jan 2020 21:10)  HR: 80 (02 Jan 2020 09:30) (70 - 104)  BP: 139/76 (02 Jan 2020 09:30) (119/63 - 175/90)  BP(mean): 95 (02 Jan 2020 09:30) (82 - 140)  RR: 25 (02 Jan 2020 09:30) (16 - 44)  SpO2: 97% (02 Jan 2020 09:30) (92% - 98%)    General:   A/O x3 NAD  Lungs:  non labored breathing  Cardiovascular:   regular  Abdomen:  soft NTND  Extremities:  WWP      Labs:                         10.3   8.68  )-----------( 283      ( 02 Jan 2020 05:02 )             33.0     01-02    143  |  106  |  10  ----------------------------<  109<H>  4.1   |  23  |  0.9    Ca    9.2      02 Jan 2020 05:02    TPro  6.7  /  Alb  4.1  /  TBili  0.3  /  DBili  x   /  AST  19  /  ALT  13  /  AlkPhos  83  01-01    PT/INR - ( 02 Jan 2020 05:02 )   PT: 15.50 sec;   INR: 1.35 ratio         PTT - ( 02 Jan 2020 05:02 )  PTT:96.6 sec    Pertinent labs:                      10.3   8.68  )-----------( 283      ( 02 Jan 2020 05:02 )             33.0       01-02    143  |  106  |  10  ----------------------------<  109<H>  4.1   |  23  |  0.9    Ca    9.2      02 Jan 2020 05:02    TPro  6.7  /  Alb  4.1  /  TBili  0.3  /  DBili  x   /  AST  19  /  ALT  13  /  AlkPhos  83  01-01      PT/INR - ( 02 Jan 2020 05:02 )   PT: 15.50 sec;   INR: 1.35 ratio         PTT - ( 02 Jan 2020 05:02 )  PTT:96.6 sec    Radiology & Additional Studies:     Radiology imaging reviewed.       ASSESSMENT/ PLAN:   70 yo F with R main PE  -imaging and laboratory values reviewed  -Large PE, with minimal appearance of Right heart strain on CT with no troponin leak and VSS  -f/u LE duplex and echo-will f/u if patient requires secondary thrombectomy/thrombolyis procedure.         Risks, benefits, and alternatives to treatment discussed. All questions answered with understanding.    Thank you for the courtesy of this consult, please call [phone] with any further questions.

## 2020-01-02 NOTE — PROGRESS NOTE ADULT - SUBJECTIVE AND OBJECTIVE BOX
SHAYAN VILLAFUERTE  [facility]  A ([facility]- ICU)      Patient is a 69y old  Female who presents with a chief complaint of Shortness of breath (02 Jan 2020 09:38)        -PMHx: Hypertension [Active]  H/O Hashimoto thyroiditis [Active]    -PSHx:    Interval events:  Patient seen and examined at bedside. No acute events overnight. She is complaining of dyspnea on exertion and dry cough. She also reports one hour of feeling like the room is spinning when she moves her head. No nausea/vomiting/double vison/headaches.     REVIEW OF SYSTEMS:  CONSTITUTIONAL: No fever, weight loss, or fatigue  RESPIRATORY: +cough, +sob  CARDIOVASCULAR: No chest pain, palpitations, dizziness, or leg swelling  GASTROINTESTINAL: No abdominal or epigastric pain. No nausea, vomiting, or hematemesis; No diarrhea or constipation. No melena or hematochezia.  NEUROLOGICAL: +dizziness  LYMPH NODES: No enlarged glands  MUSCULOSKELETAL: No joint pain or swelling; No muscle, back, or extremity pain      T(C): , Max: 37 (01-01-20 @ 21:10)  HR: 74 (01-02-20 @ 12:00)  BP: 153/74 (01-02-20 @ 12:00)  RR: 22 (01-02-20 @ 12:00)  SpO2: 93% (01-02-20 @ 12:00)  CAPILLARY BLOOD GLUCOSE      PHYSICAL EXAM:  GENERAL: On nasal cannula  HEAD:  Atraumatic, Normocephalic  EYES: EOMI, PERRLA, conjunctiva and sclera clear  NECK: Supple, No JVD  CHEST/LUNG: Clear to auscultation bilaterally; No wheeze  HEART: Regular rate and rhythm; No murmurs, rubs, or gallops  ABDOMEN: Soft, Nontender, Nondistended; Bowel sounds present  EXTREMITIES:  2+ Peripheral Pulses, No clubbing, cyanosis, or edema  PSYCH: AAOx3  NEUROLOGY: non-focal  SKIN: No rashes or lesions      LABS:          10.3  8.68  )-------(283          33.0  N=59.0  L=25.1  MCV=89.2          11.5  8.80  )-------(324          36.6  N=--  L=--  MCV=88.8    143|106|10  ------------------<109<H>  4.1|23|0.9  eGFR:66  Ca:9.2  142|105|10  ------------------<96  3.6|20|0.9  eGFR:66  Ca:9.6      PT/INR - ( 02 Jan 2020 05:02 )   PT: 15.50 sec;   INR: 1.35 ratio         PTT - ( 02 Jan 2020 05:02 )  PTT:96.6 sec      Microbiology:      RADIOLOGY & ADDITIONAL TESTS:  < from: CT Angio Chest w/ IV Cont (01.01.20 @ 17:44) >    IMPRESSION:  1.  Acute pulmonary emboli in the right main pulmonary artery with extension into all lobar branches as well as multiple bilateral segmental pulmonary emboli; there is CT evidence of right heart strain.    2.  Central left upper lobe airspace opacity and additional nodular superior segment left lower lobe opacities may represent areas of ischemic pulmonary hemorrhage versus infarction; other infectious/inflammatory processes are alsoin the differential.    3.  Indeterminate 2.8 cm left thyroid lobe nodule. Further evaluation with a outpatient thyroid ultrasound is recommended, if not previously for interrogated.    < end of copied text >        Medications:  chlorhexidine 4% Liquid 1 Application(s) Topical <User Schedule>  diltiazem    milliGRAM(s) Oral daily  heparin  Infusion 1000 Unit(s)/Hr IV Continuous <Continuous>  levothyroxine 150 MICROGram(s) Oral daily  losartan 100 milliGRAM(s) Oral daily  melatonin 5 milliGRAM(s) Oral at bedtime  montelukast 10 milliGRAM(s) Oral daily  pantoprazole    Tablet 40 milliGRAM(s) Oral before breakfast

## 2020-01-02 NOTE — CHART NOTE - NSCHARTNOTEFT_GEN_A_CORE
ICU DOWNGRADE NOTE:    69y Female transferred to floor from ICU    Patient is a 69y old Female who presents with a chief complaint of Shortness of breath (02 Jan 2020 12:32)    The patient is currently admitted for the primary diagnosis of Pulmonary embolism    The patient was admitted to the unit for 1 Days.    The patient was never intubated and never on pressors.     Indwelling vascular catheters: None    Urinary Catheter: None    Disposition: Home    Code Status: FULL      ICU COURSE OF EVENTS:  69 year old female with Hashimoto's thyroiditis, HTN presenting due to shortness of breath of 5 days duration. CT angio showed < from: CT Angio Chest w/ IV Cont (01.01.20 @ 17:44) >    Acute pulmonary emboli in the right main pulmonary artery with extension into all lobar branches as well as multiple bilateral segmental pulmonary emboli; there is CT evidence of right heart strain.    < end of copied text >  There was also a left lung opacity that was unspecified. Patient was started on Heparin drip and IR was consulted. ECHO did not show any right heart strain and LE duplex was negative for DVT. Per IR, no intervention needed at this time. Patient was started on Eliquis and heparin drip was d/c.       Current workup in progress:    #Pulmonary embolism, right main pulmonary artery  -On Eliquis 10mg BID for 7 days  -Troponin, BNP wnl  -Large PE, with minimal appearance of Right heart strain on CT with no troponin leak and VSS  -ECHO and LE duplex reviewed  -No clear symptoms of pneumonia, no fever, no WBC count. Will hold off antibiotics    #JACLYN Opacity  -CT with left upper lobe opacity, differential of hemorrhage vs infectious process  - Will need outpatient pulmonology follow up     #Dizziness  -Meclizine 12.5mg bid PRN    #HTN  -Continue Cardizem 300mg qd, Losartan 100mg qd, Lasix 20mg qd     #Hypothyroidism  -Levothyroxine 150mcg    #Diet: DASH  #DVT PPX: Eliquis  #GI PPX: Not indicated  #Dispo: From home  #Activity: bedrest for now  #Full code

## 2020-01-02 NOTE — CONSULT NOTE ADULT - SUBJECTIVE AND OBJECTIVE BOX
Patient is a 69y old  Female who presents with a chief complaint of Shortness of breath (02 Jan 2020 09:15)    HPI:  69 year old female with Hashimoto's thyroiditis, HTN presenting due to shortness of breath of 5 days duration. For that period of time patient reports feeling tired and short of breath on walking even small distances, she also has some non-productive cough when she takes a deep breath. She has chronic leg swelling left worse than right. No recent travel.  Denies fevers, chills, chest pain, abdominal pain. (01 Jan 2020 21:35)  She was seen at  for SOB 5 2 days prior to presentation. She was later called to go to ED.  CTA positive PE     PAST MEDICAL & SURGICAL HISTORY:  Hypertension  H/O Hashimoto thyroiditis      SOCIAL HX:   Smoking       Former                   ETOH                            Other    FAMILY HISTORY:  Family history of stroke: Mother  :  No known cardiovacular family hisotry     Review Of Systems:     CONSTITUTIONAL:   no fever   no chills.  no weight gain   no weight loss    EYES:   no discharge,   no pain  no redness,   no visual changes.    ENT:   Ears: no ear pain and no hearing problems.  Nose: no nasal congestion and no nasal drainage.  Mouth/Throat: no dysphagia,  no hoarseness and no throat pain.  Neck: no lumps, no pain, no stiffness and no swollen glands.     CARDIOVASCULAR:   no chest pain,   no swelling  no palpitaions  no syncope    RESPIRATORY:  Per HPI     GASTROINTESTINAL:   no abdominal pain,   no constipation,   no diarrhea,   no vomiting.    GENITOURINARY:  no dysuria,   no frequency,   no urgency  no hematuria.    MUSCULOSKELETAL:   no back pain,   no musculoskeletal pain,  no weakness.  RLE pain     SKIN:   no jaundice,   no lesions,   no pruritis,   no rashes.    NEURO:   no loss of consciousness,   no gait abnormality,   no headache,   no sensory deficits,   no weakness.    PSYCHIATRIC:   no known mental health issues  no anxiety  no depression    ALLERGIC/IMMUNOLOGIC:   No active allergic or immunologic issues      Allergies    No Known Allergies    Intolerances          PHYSICAL EXAM    ICU Vital Signs Last 24 Hrs  T(C): 36.7 (02 Jan 2020 08:00), Max: 37 (01 Jan 2020 21:10)  T(F): 98.1 (02 Jan 2020 08:00), Max: 98.6 (01 Jan 2020 21:10)  HR: 78 (02 Jan 2020 08:30) (70 - 104)  BP: 138/80 (02 Jan 2020 08:30) (119/63 - 175/90)  BP(mean): 99 (02 Jan 2020 08:30) (82 - 140)  ABP: --  ABP(mean): --  RR: 25 (02 Jan 2020 08:30) (16 - 44)  SpO2: 95% (02 Jan 2020 08:30) (92% - 98%)      CONSTITUTIONAL:  Well nourished.   NAD    ENT:   Airway patent,   Mouth with normal mucosa.   No thrush      CARDIAC:   Normal rate,   Regular rhythm.    No edema      Vascular:   normal systolic impulse  no bruits    RESPIRATORY:   No wheezing  Bilateral BS   Not tachypneic,  No use of accessory muscles    GASTROINTESTINAL:  Abdomen soft,   Non-tender,   No guarding,   + BS      NEUROLOGICAL:   Alert and oriented   No motor deficits.  Pertinent DTRs normal    SKIN:   Skin normal color for race,   No evidence of rash.      HEME LYMPH:   No cervical  lymphadenopathy.  No inguinal lymphadenopathy            01-01-20 @ 07:01  -  01-02-20 @ 07:00  --------------------------------------------------------  IN:    heparin  Infusion.: 91 mL    heparin Infusion: 44 mL    Oral Fluid: 340 mL  Total IN: 475 mL    OUT:    Voided: 575 mL  Total OUT: 575 mL    Total NET: -100 mL      01-02-20 @ 07:01  -  01-02-20 @ 09:19  --------------------------------------------------------  IN:    heparin Infusion: 22 mL  Total IN: 22 mL    OUT:  Total OUT: 0 mL    Total NET: 22 mL          LABS:                          10.3   8.68  )-----------( 283      ( 02 Jan 2020 05:02 )             33.0                                               01-02    143  |  106  |  10  ----------------------------<  109<H>  4.1   |  23  |  0.9    Ca    9.2      02 Jan 2020 05:02    TPro  6.7  /  Alb  4.1  /  TBili  0.3  /  DBili  x   /  AST  19  /  ALT  13  /  AlkPhos  83  01-01      PT/INR - ( 02 Jan 2020 05:02 )   PT: 15.50 sec;   INR: 1.35 ratio         PTT - ( 02 Jan 2020 05:02 )  PTT:96.6 sec                                           CARDIAC MARKERS ( 02 Jan 2020 05:02 )  x     / <0.01 ng/mL / x     / x     / x      CARDIAC MARKERS ( 02 Jan 2020 00:44 )  x     / <0.01 ng/mL / x     / x     / x      CARDIAC MARKERS ( 01 Jan 2020 13:40 )  x     / <0.01 ng/mL / x     / x     / x                                                LIVER FUNCTIONS - ( 01 Jan 2020 13:40 )  Alb: 4.1 g/dL / Pro: 6.7 g/dL / ALK PHOS: 83 U/L / ALT: 13 U/L / AST: 19 U/L / GGT: x                                                                                                                                       X-Rays reviewed                                                                                     ECHO    CXR interpreted by julian valdovinos     MEDICATIONS  (STANDING):  chlorhexidine 4% Liquid 1 Application(s) Topical <User Schedule>  diltiazem    milliGRAM(s) Oral daily  furosemide    Tablet 20 milliGRAM(s) Oral daily  heparin  Infusion 1100 Unit(s)/Hr (11 mL/Hr) IV Continuous <Continuous>  levothyroxine 150 MICROGram(s) Oral daily  losartan 100 milliGRAM(s) Oral daily  melatonin 5 milliGRAM(s) Oral at bedtime  montelukast 10 milliGRAM(s) Oral daily  pantoprazole    Tablet 40 milliGRAM(s) Oral before breakfast    MEDICATIONS  (PRN):

## 2020-01-02 NOTE — PROGRESS NOTE ADULT - SUBJECTIVE AND OBJECTIVE BOX
Patient was seen and examined in ICU . Spoke with RN. Chart reviewed- see H and P  Vital Signs Last 24 Hrs  T(F): 98.1 (02 Jan 2020 08:00), Max: 98.6 (01 Jan 2020 21:10)  HR: 78 (02 Jan 2020 08:30) (70 - 104)  BP: 138/80 (02 Jan 2020 08:30) (119/63 - 175/90)  SpO2: 95% (02 Jan 2020 08:30) (92% - 98%)  MEDICATIONS  (STANDING):  chlorhexidine 4% Liquid 1 Application(s) Topical <User Schedule>  diltiazem    milliGRAM(s) Oral daily  furosemide    Tablet 20 milliGRAM(s) Oral daily  heparin  Infusion 1100 Unit(s)/Hr (11 mL/Hr) IV Continuous <Continuous>  levothyroxine 150 MICROGram(s) Oral daily  losartan 100 milliGRAM(s) Oral daily  melatonin 5 milliGRAM(s) Oral at bedtime  montelukast 10 milliGRAM(s) Oral daily  pantoprazole    Tablet 40 milliGRAM(s) Oral before breakfast    MEDICATIONS  (PRN):    Labs:                        10.3   8.68  )-----------( 283      ( 02 Jan 2020 05:02 )             33.0                         11.5   8.80  )-----------( 324      ( 01 Jan 2020 13:40 )             36.6     02 Jan 2020 05:02    143    |  106    |  10     ----------------------------<  109    4.1     |  23     |  0.9    01 Jan 2020 13:40    142    |  105    |  10     ----------------------------<  96     3.6     |  20     |  0.9      Ca    9.2        02 Jan 2020 05:02  Ca    9.6        01 Jan 2020 13:40    TPro  6.7    /  Alb  4.1    /  TBili  0.3    /  DBili  x      /  AST  19     /  ALT  13     /  AlkPhos  83     01 Jan 2020 13:40    PT/INR - ( 02 Jan 2020 05:02 )   PT: 15.50 sec;   INR: 1.35 ratio         PTT - ( 02 Jan 2020 05:02 )  PTT:96.6 sec      A/P:  69 year old female with Hashimoto's thyroiditis, HTN presenting due to shortness of breath admitted to ICU with acute B/L Pulmonary embolism    anticoagulation    vascular eval for possible thrombectomy    pulm eval    O2 as needed    plan as per ICU/pulm team .   DVT prophylaxis  Decubitus prevention- all measures as per RN protocol  Please call or text me with any questions or updates

## 2020-01-02 NOTE — PROGRESS NOTE ADULT - ASSESSMENT
69 year old female with Hashimoto's thyroiditis, HTN presenting due to shortness of breath of 5 days duration.    #Pulmonary embolism, right main pulmonary artery  -On Heparin drip, f/u PTT  -Troponin, BNP wnl  -Large PE, with minimal appearance of Right heart strain on CT with no troponin leak and VSS  -f/u LE duplex and echo-will f/u if patient requires secondary thrombectomy/thrombolyis procedure.   -CT with left upper lobe opacity, differential of hemorrhage vs infectious process  -No clear symptoms of pneumonia, no fever, no WBC count. Will hold off antibiotics    #Dizziness  -    #HTN  -Continue Cardizem 300mg qd, Losartan 100mg qd, Lasix 20mg qd     #Hypothyroidism  -Levothyroxine 150mcg    #Diet: DASH  #DVT PPX: On Heparin drip  #GI PPX: Not indicated  #Dispo: From home  #Activity: bedrest for now  #Full code 69 year old female with Hashimoto's thyroiditis, HTN presenting due to shortness of breath of 5 days duration.    #Pulmonary embolism, right main pulmonary artery  -On Heparin drip, f/u PTT  -Troponin, BNP wnl  -Large PE, with minimal appearance of Right heart strain on CT with no troponin leak and VSS  -f/u LE duplex and echo-will f/u if patient requires secondary thrombectomy/thrombolyis procedure.   -CT with left upper lobe opacity, differential of hemorrhage vs infectious process  -No clear symptoms of pneumonia, no fever, no WBC count. Will hold off antibiotics    #Dizziness  -Meclizine 12.5mg bid    #HTN  -Continue Cardizem 300mg qd, Losartan 100mg qd, Lasix 20mg qd     #Hypothyroidism  -Levothyroxine 150mcg    #Diet: DASH  #DVT PPX: On Heparin drip  #GI PPX: Not indicated  #Dispo: From home  #Activity: bedrest for now  #Full code

## 2020-01-02 NOTE — CONSULT NOTE ADULT - ASSESSMENT
IMPRESSION:    Bilateral PE unprovoked  JACLYN density    PLAN:    CNS: no depressants     HEENT: Oral care    PULMONARY:  HOB @ 45 degrees.  Wean O2.  Continue AC     CARDIOVASCULAR:  ECHO.  I=O    GI: GI prophylaxis.  Feeding     RENAL:  Follow up lytes.  Correct as needed    INFECTIOUS DISEASE: Follow up cultures    HEMATOLOGICAL:  DVT therapy.  FU PTT.  LE duplex STAT     ENDOCRINE:  Follow up FS.  Insulin protocol if needed.    MUSCULOSKELETAL:  Bed chair position     transfer to [facility] PM    Age appropriate malignancy FLETCHER.  OP hypercoagulable state FLETCHER    Will need OP pulm FU for the JACLYN lesion

## 2020-01-03 ENCOUNTER — TRANSCRIPTION ENCOUNTER (OUTPATIENT)
Age: 70
End: 2020-01-03

## 2020-01-03 LAB
ALBUMIN SERPL ELPH-MCNC: 3.5 G/DL — SIGNIFICANT CHANGE UP (ref 3.5–5.2)
ALP SERPL-CCNC: 76 U/L — SIGNIFICANT CHANGE UP (ref 30–115)
ALT FLD-CCNC: 10 U/L — SIGNIFICANT CHANGE UP (ref 0–41)
ANION GAP SERPL CALC-SCNC: 14 MMOL/L — SIGNIFICANT CHANGE UP (ref 7–14)
AST SERPL-CCNC: 15 U/L — SIGNIFICANT CHANGE UP (ref 0–41)
BASOPHILS # BLD AUTO: 0.06 K/UL — SIGNIFICANT CHANGE UP (ref 0–0.2)
BASOPHILS NFR BLD AUTO: 0.9 % — SIGNIFICANT CHANGE UP (ref 0–1)
BILIRUB SERPL-MCNC: 0.4 MG/DL — SIGNIFICANT CHANGE UP (ref 0.2–1.2)
BUN SERPL-MCNC: 15 MG/DL — SIGNIFICANT CHANGE UP (ref 10–20)
CALCIUM SERPL-MCNC: 9.3 MG/DL — SIGNIFICANT CHANGE UP (ref 8.5–10.1)
CHLORIDE SERPL-SCNC: 104 MMOL/L — SIGNIFICANT CHANGE UP (ref 98–110)
CO2 SERPL-SCNC: 23 MMOL/L — SIGNIFICANT CHANGE UP (ref 17–32)
CREAT SERPL-MCNC: 0.9 MG/DL — SIGNIFICANT CHANGE UP (ref 0.7–1.5)
EOSINOPHIL # BLD AUTO: 0.16 K/UL — SIGNIFICANT CHANGE UP (ref 0–0.7)
EOSINOPHIL NFR BLD AUTO: 2.3 % — SIGNIFICANT CHANGE UP (ref 0–8)
GLUCOSE SERPL-MCNC: 103 MG/DL — HIGH (ref 70–99)
HCT VFR BLD CALC: 31.9 % — LOW (ref 37–47)
HCV AB S/CO SERPL IA: 0.09 S/CO — SIGNIFICANT CHANGE UP (ref 0–0.99)
HCV AB SERPL-IMP: SIGNIFICANT CHANGE UP
HGB BLD-MCNC: 10.1 G/DL — LOW (ref 12–16)
IMM GRANULOCYTES NFR BLD AUTO: 0.1 % — SIGNIFICANT CHANGE UP (ref 0.1–0.3)
LYMPHOCYTES # BLD AUTO: 1.89 K/UL — SIGNIFICANT CHANGE UP (ref 1.2–3.4)
LYMPHOCYTES # BLD AUTO: 27.2 % — SIGNIFICANT CHANGE UP (ref 20.5–51.1)
MAGNESIUM SERPL-MCNC: 2.2 MG/DL — SIGNIFICANT CHANGE UP (ref 1.8–2.4)
MCHC RBC-ENTMCNC: 28.2 PG — SIGNIFICANT CHANGE UP (ref 27–31)
MCHC RBC-ENTMCNC: 31.7 G/DL — LOW (ref 32–37)
MCV RBC AUTO: 89.1 FL — SIGNIFICANT CHANGE UP (ref 81–99)
MONOCYTES # BLD AUTO: 0.92 K/UL — HIGH (ref 0.1–0.6)
MONOCYTES NFR BLD AUTO: 13.2 % — HIGH (ref 1.7–9.3)
NEUTROPHILS # BLD AUTO: 3.92 K/UL — SIGNIFICANT CHANGE UP (ref 1.4–6.5)
NEUTROPHILS NFR BLD AUTO: 56.3 % — SIGNIFICANT CHANGE UP (ref 42.2–75.2)
NRBC # BLD: 0 /100 WBCS — SIGNIFICANT CHANGE UP (ref 0–0)
PLATELET # BLD AUTO: 290 K/UL — SIGNIFICANT CHANGE UP (ref 130–400)
POTASSIUM SERPL-MCNC: 4.2 MMOL/L — SIGNIFICANT CHANGE UP (ref 3.5–5)
POTASSIUM SERPL-SCNC: 4.2 MMOL/L — SIGNIFICANT CHANGE UP (ref 3.5–5)
PROT SERPL-MCNC: 6 G/DL — SIGNIFICANT CHANGE UP (ref 6–8)
RBC # BLD: 3.58 M/UL — LOW (ref 4.2–5.4)
RBC # FLD: 12.6 % — SIGNIFICANT CHANGE UP (ref 11.5–14.5)
SODIUM SERPL-SCNC: 141 MMOL/L — SIGNIFICANT CHANGE UP (ref 135–146)
WBC # BLD: 6.96 K/UL — SIGNIFICANT CHANGE UP (ref 4.8–10.8)
WBC # FLD AUTO: 6.96 K/UL — SIGNIFICANT CHANGE UP (ref 4.8–10.8)

## 2020-01-03 PROCEDURE — 71045 X-RAY EXAM CHEST 1 VIEW: CPT | Mod: 26

## 2020-01-03 RX ORDER — APIXABAN 2.5 MG/1
2 TABLET, FILM COATED ORAL
Qty: 120 | Refills: 0
Start: 2020-01-03 | End: 2020-02-01

## 2020-01-03 RX ORDER — MECLIZINE HCL 12.5 MG
1 TABLET ORAL
Qty: 60 | Refills: 0
Start: 2020-01-03 | End: 2020-02-01

## 2020-01-03 RX ADMIN — APIXABAN 10 MILLIGRAM(S): 2.5 TABLET, FILM COATED ORAL at 17:37

## 2020-01-03 RX ADMIN — APIXABAN 10 MILLIGRAM(S): 2.5 TABLET, FILM COATED ORAL at 06:01

## 2020-01-03 RX ADMIN — Medication 150 MICROGRAM(S): at 06:01

## 2020-01-03 RX ADMIN — LOSARTAN POTASSIUM 100 MILLIGRAM(S): 100 TABLET, FILM COATED ORAL at 06:01

## 2020-01-03 RX ADMIN — PANTOPRAZOLE SODIUM 40 MILLIGRAM(S): 20 TABLET, DELAYED RELEASE ORAL at 06:01

## 2020-01-03 RX ADMIN — Medication 5 MILLIGRAM(S): at 22:03

## 2020-01-03 RX ADMIN — MONTELUKAST 10 MILLIGRAM(S): 4 TABLET, CHEWABLE ORAL at 12:26

## 2020-01-03 RX ADMIN — Medication 300 MILLIGRAM(S): at 22:03

## 2020-01-03 NOTE — DISCHARGE NOTE NURSING/CASE MANAGEMENT/SOCIAL WORK - NSDCPEEMAIL_GEN_ALL_CORE
[facility] email [email or web address]

## 2020-01-03 NOTE — PROGRESS NOTE ADULT - SUBJECTIVE AND OBJECTIVE BOX
Patient is a 69y old  Female who presents with a chief complaint of Shortness of breath (03 Jan 2020 12:39)        SUBJECTIVE:  Desaturation to 80s on RA on ambulation    REVIEW OF SYSTEMS:    CONSTITUTIONAL:   no fever   no chills.  no weight gain   no weight loss    EYES:   no discharge,   no pain  no redness,   no visual changes.    ENT:   Ears: no ear pain and no hearing problems.  Nose: no nasal congestion and no nasal drainage.  Mouth/Throat: no dysphagia,  no hoarseness and no throat pain.  Neck: no lumps, no pain, no stiffness and no swollen glands.     CARDIOVASCULAR:   no chest pain,   no swelling  no palpitaions  no syncope    RESPIRATORY:  + SOB,  no wheezing ,  no respiratory difficulty  no sputum production    GASTROINTESTINAL:   no abdominal pain,   no constipation,   no diarrhea,   no vomiting.    GENITOURINARY:  no dysuria,   no frequency,   no urgency  no hematuria.    MUSCULOSKELETAL:   no back pain,   no musculoskeletal pain,  no weakness.    SKIN:   no jaundice,   no lesions,   no pruritis,   no rashes.    NEURO:   no loss of consciousness,   no gait abnormality,   no headache,   no sensory deficits,   no weakness.    PSYCHIATRIC:   no known mental health issues  no anxiety  no depression    ALLERGIC/IMMUNOLOGIC:   No active allergic or immunologic issues      PHYSICAL EXAM  Vital Signs Last 24 Hrs  T(C): 36.6 (03 Jan 2020 05:00), Max: 37.1 (02 Jan 2020 20:27)  T(F): 97.9 (03 Jan 2020 05:00), Max: 98.7 (02 Jan 2020 20:27)  HR: 72 (03 Jan 2020 05:00) (72 - 90)  BP: 108/53 (03 Jan 2020 05:00) (108/53 - 154/71)  BP(mean): 109 (02 Jan 2020 18:00) (94 - 109)  RR: 18 (03 Jan 2020 05:00) (18 - 43)  SpO2: 94% (03 Jan 2020 08:12) (88% - 96%) 2L NC    CONSTITUTIONAL:  Well nourished.  NAD    ENT:   Airway patent,   Nasal mucosa clear.  Mouth with normal mucosa.   Throat has no vesicles,  no oropharyngeal exudates and uvula is midline.  No thrush    EYES:   Clear bilaterally,   pupils equal,   round and reactive to light.    CARDIAC:   Normal rate,   regular rhythm.    Heart sounds S1, S2.   normal  cardiac impulse  no edema      CAROTID:   normal systolic impulse  no bruits    RESPIRATORY:   no w/r/r/,   normal chest expansion  not tachypneic,  no use of accessory muscles    GASTROINTESTINAL:  Abdomen soft,   non-tender,   no guarding,   + BS    GENITOURINARY  normal genitalia for sex  no edema    MUSCULOSKELETAL:   range of motion is not limited,  no muscle or joint tenderness  no clubbing, cyanosis    NEUROLOGICAL:   Alert and oriented   no focal deficits in cranial nerve areas  no motor or sensory deficits.  pertinent DTRs normal    SKIN:   Skin normal color for race,   warm,   dry and intact.   No evidence of rash.    PSYCHIATRIC:   Alert and oriented to person,   place, time/situation.   normal mood and affect.   no apparent risk to self or others.    HEME LYMPH:   no splenomegaly.  No cervical  lymphadenopathy.  no inguinal lymphadenopathy      01-02-20 @ 07:01  -  01-03-20 @ 07:00  --------------------------------------------------------  IN:    heparin Infusion: 44 mL    heparin Infusion: 30 mL    heparin Infusion: 21 mL  Total IN: 95 mL    OUT:  Total OUT: 0 mL    Total NET: 95 mL          LABS:                          10.1   6.96  )-----------( 290      ( 03 Jan 2020 04:30 )             31.9                                               01-03    141  |  104  |  15  ----------------------------<  103<H>  4.2   |  23  |  0.9    Ca    9.3      03 Jan 2020 04:30  Mg     2.2     01-03    TPro  6.0  /  Alb  3.5  /  TBili  0.4  /  DBili  x   /  AST  15  /  ALT  10  /  AlkPhos  76  01-03      PT/INR - ( 02 Jan 2020 05:02 )   PT: 15.50 sec;   INR: 1.35 ratio         PTT - ( 02 Jan 2020 17:16 )  PTT:64.1 sec                                           CARDIAC MARKERS ( 02 Jan 2020 05:02 )  x     / <0.01 ng/mL / x     / x     / x      CARDIAC MARKERS ( 02 Jan 2020 00:44 )  x     / <0.01 ng/mL / x     / x     / x                                                LIVER FUNCTIONS - ( 03 Jan 2020 04:30 )  Alb: 3.5 g/dL / Pro: 6.0 g/dL / ALK PHOS: 76 U/L / ALT: 10 U/L / AST: 15 U/L / GGT: x                                                                                                MEDICATIONS  (STANDING):  apixaban 10 milliGRAM(s) Oral every 12 hours  chlorhexidine 4% Liquid 1 Application(s) Topical <User Schedule>  diltiazem    milliGRAM(s) Oral daily  levothyroxine 150 MICROGram(s) Oral daily  losartan 100 milliGRAM(s) Oral daily  meclizine 12.5 milliGRAM(s) Oral two times a day  melatonin 5 milliGRAM(s) Oral at bedtime  montelukast 10 milliGRAM(s) Oral daily  pantoprazole    Tablet 40 milliGRAM(s) Oral before breakfast    MEDICATIONS  (PRN):  guaiFENesin   Syrup  (Sugar-Free) 100 milliGRAM(s) Oral every 6 hours PRN Cough Patient is a 69y old  Female who presents with a chief complaint of Shortness of breath (03 Jan 2020 12:39)        SUBJECTIVE:  Feels well.  NO SOB at rest     REVIEW OF SYSTEMS:    CONSTITUTIONAL:   no fever   no chills.  no weight gain   no weight loss    EYES:   no discharge,   no pain  no redness,   no visual changes.    ENT:   Ears: no ear pain and no hearing problems.  Nose: no nasal congestion and no nasal drainage.  Mouth/Throat: no dysphagia,  no hoarseness and no throat pain.  Neck: no lumps, no pain, no stiffness and no swollen glands.     CARDIOVASCULAR:   no chest pain,   no swelling  no palpitaions  no syncope    RESPIRATORY:  + SOB,  no wheezing ,  no respiratory difficulty  no sputum production    GASTROINTESTINAL:   no abdominal pain,   no constipation,   no diarrhea,   no vomiting.    GENITOURINARY:  no dysuria,   no frequency,   no urgency  no hematuria.    MUSCULOSKELETAL:   no back pain,   no musculoskeletal pain,  no weakness.    SKIN:   no jaundice,   no lesions,   no pruritis,   no rashes.    NEURO:   no loss of consciousness,   no gait abnormality,   no headache,   no sensory deficits,   no weakness.    PSYCHIATRIC:   no known mental health issues  no anxiety  no depression    ALLERGIC/IMMUNOLOGIC:   No active allergic or immunologic issues      PHYSICAL EXAM  Vital Signs Last 24 Hrs  T(C): 36.6 (03 Jan 2020 05:00), Max: 37.1 (02 Jan 2020 20:27)  T(F): 97.9 (03 Jan 2020 05:00), Max: 98.7 (02 Jan 2020 20:27)  HR: 72 (03 Jan 2020 05:00) (72 - 90)  BP: 108/53 (03 Jan 2020 05:00) (108/53 - 154/71)  BP(mean): 109 (02 Jan 2020 18:00) (94 - 109)  RR: 18 (03 Jan 2020 05:00) (18 - 43)  SpO2: 94% (03 Jan 2020 08:12) (88% - 96%) 2L NC    CONSTITUTIONAL:  Well nourished.  NAD    ENT:   Airway patent,   Nasal mucosa clear.  Mouth with normal mucosa.   Throat has no vesicles,  no oropharyngeal exudates and uvula is midline.  No thrush    EYES:   Clear bilaterally,   pupils equal,   round and reactive to light.    CARDIAC:   Normal rate,   regular rhythm.    Heart sounds S1, S2.   normal  cardiac impulse  no edema      CAROTID:   normal systolic impulse  no bruits    RESPIRATORY:   no w/r/r/,   normal chest expansion  not tachypneic,  no use of accessory muscles    GASTROINTESTINAL:  Abdomen soft,   non-tender,   no guarding,   + BS    GENITOURINARY  normal genitalia for sex  no edema    MUSCULOSKELETAL:   range of motion is not limited,  no muscle or joint tenderness  no clubbing, cyanosis    NEUROLOGICAL:   Alert and oriented   no focal deficits in cranial nerve areas  no motor or sensory deficits.  pertinent DTRs normal    SKIN:   Skin normal color for race,   warm,   dry and intact.   No evidence of rash.    PSYCHIATRIC:   Alert and oriented to person,   place, time/situation.   normal mood and affect.   no apparent risk to self or others.    HEME LYMPH:   no splenomegaly.  No cervical  lymphadenopathy.  no inguinal lymphadenopathy      01-02-20 @ 07:01  -  01-03-20 @ 07:00  --------------------------------------------------------  IN:    heparin Infusion: 44 mL    heparin Infusion: 30 mL    heparin Infusion: 21 mL  Total IN: 95 mL    OUT:  Total OUT: 0 mL    Total NET: 95 mL          LABS:                          10.1   6.96  )-----------( 290      ( 03 Jan 2020 04:30 )             31.9                                               01-03    141  |  104  |  15  ----------------------------<  103<H>  4.2   |  23  |  0.9    Ca    9.3      03 Jan 2020 04:30  Mg     2.2     01-03    TPro  6.0  /  Alb  3.5  /  TBili  0.4  /  DBili  x   /  AST  15  /  ALT  10  /  AlkPhos  76  01-03      PT/INR - ( 02 Jan 2020 05:02 )   PT: 15.50 sec;   INR: 1.35 ratio         PTT - ( 02 Jan 2020 17:16 )  PTT:64.1 sec                                           CARDIAC MARKERS ( 02 Jan 2020 05:02 )  x     / <0.01 ng/mL / x     / x     / x      CARDIAC MARKERS ( 02 Jan 2020 00:44 )  x     / <0.01 ng/mL / x     / x     / x                                                LIVER FUNCTIONS - ( 03 Jan 2020 04:30 )  Alb: 3.5 g/dL / Pro: 6.0 g/dL / ALK PHOS: 76 U/L / ALT: 10 U/L / AST: 15 U/L / GGT: x                                                                                                MEDICATIONS  (STANDING):  apixaban 10 milliGRAM(s) Oral every 12 hours  chlorhexidine 4% Liquid 1 Application(s) Topical <User Schedule>  diltiazem    milliGRAM(s) Oral daily  levothyroxine 150 MICROGram(s) Oral daily  losartan 100 milliGRAM(s) Oral daily  meclizine 12.5 milliGRAM(s) Oral two times a day  melatonin 5 milliGRAM(s) Oral at bedtime  montelukast 10 milliGRAM(s) Oral daily  pantoprazole    Tablet 40 milliGRAM(s) Oral before breakfast    MEDICATIONS  (PRN):  guaiFENesin   Syrup  (Sugar-Free) 100 milliGRAM(s) Oral every 6 hours PRN Cough gait training/GOAL: Pt will perform 13 stairs with or without U HR as needed within 3-4weeks./bed mobility training/balance training/transfer training

## 2020-01-03 NOTE — DISCHARGE NOTE NURSING/CASE MANAGEMENT/SOCIAL WORK - NSDCPEELIQUIS_GEN_ALL_CORE
Apixaban/Eliquis - Follow up monitoring/Apixaban/Eliquis - Potential for adverse drug reactions and interactions/Apixaban/Eliquis - Compliance/Apixaban/Eliquis - Dietary Advice

## 2020-01-03 NOTE — DISCHARGE NOTE PROVIDER - NSDCMRMEDTOKEN_GEN_ALL_CORE_FT
apixaban 5 mg oral tablet: 2 tab(s) orally every 12 hours for 5 days then 1 tablet twice a day until follow up with pulmonary physician.   dilTIAZem 360 mg/24 hours oral capsule, extended release: 1 cap(s) orally once a day  Lasix 20 mg oral tablet: 1 tab(s) orally once a day  levothyroxine 150 mcg (0.15 mg) oral tablet: 1 tab(s) orally once a day  losartan 100 mg oral tablet: 1 tab(s) orally once a day  meclizine 12.5 mg oral tablet: 1 tab(s) orally 2 times a day  Singulair 10 mg oral tablet: 1 tab(s) orally once a day apixaban 5 mg oral tablet: 2 tab(s) orally every 12 hours for 5 days then 1 tablet twice a day until follow up with pulmonary physician.   dilTIAZem 360 mg/24 hours oral capsule, extended release: 1 cap(s) orally once a day  guaifenesin-dextromethorphan 100 mg-10 mg/5 mL oral liquid: 100 milliliter(s) orally every 6 hours, As needed, Cough  Lasix 20 mg oral tablet: 1 tab(s) orally once a day  levothyroxine 150 mcg (0.15 mg) oral tablet: 1 tab(s) orally once a day  losartan 100 mg oral tablet: 1 tab(s) orally once a day  meclizine 12.5 mg oral tablet: 1 tab(s) orally 2 times a day  pantoprazole 20 mg oral delayed release tablet: 1 tab(s) orally once a day   Singulair 10 mg oral tablet: 1 tab(s) orally once a day

## 2020-01-03 NOTE — DISCHARGE NOTE PROVIDER - NSDCCPCAREPLAN_GEN_ALL_CORE_FT
PRINCIPAL DISCHARGE DIAGNOSIS  Diagnosis: Pulmonary embolism  Assessment and Plan of Treatment: You presented for SOB. You wee found to have a right main pulmonary artery PE. Echo showed no heart strain. You were started on heparin drip and then switch to eliquis. You will continue eliquis 10mg twice daily for 2 more days (total on 7 - starting from Jan 2nd) and then 5mg twice daily until instructed to stop by pulmonary doctor. PRINCIPAL DISCHARGE DIAGNOSIS  Diagnosis: Pulmonary embolism  Assessment and Plan of Treatment: You presented for shortness of breath. You were found to have a right main pulmonary artery pulmonary embolism. Echo showed no heart strain. You were started on heparin drip and then switch to eliquis. You will continue eliquis 10mg twice daily until January 9 and then take 5mg twice daily until instructed to stop by pulmonary doctor. Of note, your imaging showed an opacity in your left lung--please Follow up with pulmonary to make sure that this has resolved. PRINCIPAL DISCHARGE DIAGNOSIS  Diagnosis: Pulmonary embolism  Assessment and Plan of Treatment: You presented for shortness of breath. You were found to have a right main pulmonary artery pulmonary embolism. Echo showed no heart strain. You were started on heparin drip and then switch to eliquis.  -You can expect to have respiratory symptoms for about two weeks while your body works on healing   -Please continue eliquis 10mg twice daily until January 9 and then take 5mg twice daily until instructed to stop by pulmonary doctor.   -Of note, your imaging showed an opacity in your left lung--please Follow up with pulmonary to make sure that this has resolved.

## 2020-01-03 NOTE — PROGRESS NOTE ADULT - ATTENDING COMMENTS
Seen and examined with the pulmonary fellow at the bed side.  Impression and plan as outlined above.

## 2020-01-03 NOTE — DISCHARGE NOTE PROVIDER - HOSPITAL COURSE
69 year old female with Hashimoto's thyroiditis, HTN presenting due to shortness of breath of 5 days duration. Found to have a right main pulmonary artery PE. Echo showed no heart strain. She was started on heparin drip and then switch to eliquis.     Her spo2 room air at rest =90% and on ambulation =88%. Patient planned for home o2 69 year old female with Hashimoto's thyroiditis, HTN presenting due to shortness of breath of 5 days duration. Found to have a right main pulmonary artery PE. Echo showed no heart strain. She was started on heparin drip and then switch to eliquis.     Initially was thought she would need home O2, however she was then able to ambulate well with good saturation without supplemental O2, does not need home O2.     She is hemodynamically stable and ready for discharge.

## 2020-01-03 NOTE — PROGRESS NOTE ADULT - ASSESSMENT
IMPRESSION:    Bilateral PE unprovoked  JACLYN density    PLAN:    Continue Eliquis  May need home O2  O2 as needed to maintain pulse ox >92%  Hypercoaguable workup and Age appropriate cancer screening; can be done as outpatient  Outpatient pulmonary follow up for repeat Chest imaging  OOb to chair IMPRESSION:    Bilateral PE unprovoked  JACLYN density possible malignancy     PLAN:    Continue Eliquis  May need home O2 for some time   Hypercoaguable workup and Age appropriate cancer screening; can be done as outpatient  Outpatient pulmonary follow up for repeat Chest imaging  OOb to chair

## 2020-01-03 NOTE — PROGRESS NOTE ADULT - SUBJECTIVE AND OBJECTIVE BOX
SHAYAN VILLAFUERTE 69y Female  MRN#: [id number]     SUBJECTIVE  Patient is a 69y old Female who presents with a chief complaint of Shortness of breath (03 Jan 2020 08:00)  Currently admitted to medicine with the primary diagnosis of Pulmonary embolism  Today is hospital day 2d, and this morning she is says she feels short of breath. Desaturating when she walks to the bathroom off oxygen. Otherwise no complaints.   OBJECTIVE  PAST MEDICAL & SURGICAL HISTORY  Hypertension  H/O Hashimoto thyroiditis    ALLERGIES:  No Known Allergies    MEDICATIONS:  STANDING MEDICATIONS  apixaban 10 milliGRAM(s) Oral every 12 hours  chlorhexidine 4% Liquid 1 Application(s) Topical <User Schedule>  diltiazem    milliGRAM(s) Oral daily  levothyroxine 150 MICROGram(s) Oral daily  losartan 100 milliGRAM(s) Oral daily  meclizine 12.5 milliGRAM(s) Oral two times a day  melatonin 5 milliGRAM(s) Oral at bedtime  montelukast 10 milliGRAM(s) Oral daily  pantoprazole    Tablet 40 milliGRAM(s) Oral before breakfast    PRN MEDICATIONS  guaiFENesin   Syrup  (Sugar-Free) 100 milliGRAM(s) Oral every 6 hours PRN      VITAL SIGNS: Last 24 Hours  T(C): 36.6 (03 Jan 2020 05:00), Max: 37.1 (02 Jan 2020 20:27)  T(F): 97.9 (03 Jan 2020 05:00), Max: 98.7 (02 Jan 2020 20:27)  HR: 72 (03 Jan 2020 05:00) (72 - 90)  BP: 108/53 (03 Jan 2020 05:00) (108/53 - 158/74)  BP(mean): 109 (02 Jan 2020 18:00) (84 - 111)  RR: 18 (03 Jan 2020 05:00) (18 - 43)  SpO2: 94% (03 Jan 2020 08:12) (88% - 97%)    LABS:                        10.1   6.96  )-----------( 290      ( 03 Jan 2020 04:30 )             31.9     01-03    141  |  104  |  15  ----------------------------<  103<H>  4.2   |  23  |  0.9    Ca    9.3      03 Jan 2020 04:30  Mg     2.2     01-03    TPro  6.0  /  Alb  3.5  /  TBili  0.4  /  DBili  x   /  AST  15  /  ALT  10  /  AlkPhos  76  01-03    PT/INR - ( 02 Jan 2020 05:02 )   PT: 15.50 sec;   INR: 1.35 ratio         PTT - ( 02 Jan 2020 17:16 )  PTT:64.1 sec      CARDIAC MARKERS ( 02 Jan 2020 05:02 )  x     / <0.01 ng/mL / x     / x     / x      CARDIAC MARKERS ( 02 Jan 2020 00:44 )  x     / <0.01 ng/mL / x     / x     / x      CARDIAC MARKERS ( 01 Jan 2020 13:40 )  x     / <0.01 ng/mL / x     / x     / x          RADIOLOGY:    PHYSICAL EXAM:    GENERAL: NAD, well-developed, AAOx3  HEENT:  Atraumatic, Normocephalic. EOMI, PERRLA, conjunctiva and sclera clear, No JVD  PULMONARY: decreased air entry on left upper lobe.  No wheeze, no crackles  CARDIOVASCULAR: Regular rate and rhythm; No murmurs, rubs, or gallops  GASTROINTESTINAL: Soft, Nontender, Nondistended; Bowel sounds present  MUSCULOSKELETAL:  2+ Peripheral Pulses, No clubbing, cyanosis, or edema  NEUROLOGY: non-focal    ASSESSMENT AND PLAN  69 year old female with Hashimoto's thyroiditis, HTN presenting due to shortness of breath of 5 days duration. Found to have a right main pulmonary artery PE. Echo showed no heart     #Pulmonary embolism, right main pulmonary artery  -On Heparin drip, f/u PTT  -Troponin, BNP wnl  -Large PE, with minimal appearance of Right heart strain on CT with no troponin leak and VSS  -f/u LE duplex and echo-will f/u if patient requires secondary thrombectomy/thrombolyis procedure.   -CT with left upper lobe opacity, differential of hemorrhage vs infectious process  -No clear symptoms of pneumonia, no fever, no WBC count. Will hold off antibiotics    #Dizziness  -Meclizine 12.5mg bid    #HTN  -Continue Cardizem 300mg qd, Losartan 100mg qd, Lasix 20mg qd     #Hypothyroidism  -Levothyroxine 150mcg    #Diet: DASH  #DVT PPX: On Heparin drip  #GI PPX: Not indicated  #Dispo: From home  #Activity: bedrest for now  #Full code  SKIN: No rashes or lesions    ASSESSMENT & PLAN        DVT prophylaxis: full anticoagulation - eliquis  Diet: Diet, DASH/TLC:   Dispo: home  Ambulation: as tolerated - patient walks independently SHAYAN VILLAFUERTE 69y Female  MRN#: [id number]     SUBJECTIVE  Patient is a 69y old Female who presents with a chief complaint of Shortness of breath (03 Jan 2020 08:00)  Currently admitted to medicine with the primary diagnosis of Pulmonary embolism  Today is hospital day 2d, and this morning she is says she feels short of breath. Desaturating when she walks to the bathroom off oxygen. Otherwise no complaints.   OBJECTIVE  PAST MEDICAL & SURGICAL HISTORY  Hypertension  H/O Hashimoto thyroiditis    ALLERGIES:  No Known Allergies    MEDICATIONS:  STANDING MEDICATIONS  apixaban 10 milliGRAM(s) Oral every 12 hours  chlorhexidine 4% Liquid 1 Application(s) Topical <User Schedule>  diltiazem    milliGRAM(s) Oral daily  levothyroxine 150 MICROGram(s) Oral daily  losartan 100 milliGRAM(s) Oral daily  meclizine 12.5 milliGRAM(s) Oral two times a day  melatonin 5 milliGRAM(s) Oral at bedtime  montelukast 10 milliGRAM(s) Oral daily  pantoprazole    Tablet 40 milliGRAM(s) Oral before breakfast    PRN MEDICATIONS  guaiFENesin   Syrup  (Sugar-Free) 100 milliGRAM(s) Oral every 6 hours PRN      VITAL SIGNS: Last 24 Hours  T(C): 36.6 (03 Jan 2020 05:00), Max: 37.1 (02 Jan 2020 20:27)  T(F): 97.9 (03 Jan 2020 05:00), Max: 98.7 (02 Jan 2020 20:27)  HR: 72 (03 Jan 2020 05:00) (72 - 90)  BP: 108/53 (03 Jan 2020 05:00) (108/53 - 158/74)  BP(mean): 109 (02 Jan 2020 18:00) (84 - 111)  RR: 18 (03 Jan 2020 05:00) (18 - 43)  SpO2: 94% (03 Jan 2020 08:12) (88% - 97%)    LABS:                        10.1   6.96  )-----------( 290      ( 03 Jan 2020 04:30 )             31.9     01-03    141  |  104  |  15  ----------------------------<  103<H>  4.2   |  23  |  0.9    Ca    9.3      03 Jan 2020 04:30  Mg     2.2     01-03    TPro  6.0  /  Alb  3.5  /  TBili  0.4  /  DBili  x   /  AST  15  /  ALT  10  /  AlkPhos  76  01-03    PT/INR - ( 02 Jan 2020 05:02 )   PT: 15.50 sec;   INR: 1.35 ratio         PTT - ( 02 Jan 2020 17:16 )  PTT:64.1 sec      CARDIAC MARKERS ( 02 Jan 2020 05:02 )  x     / <0.01 ng/mL / x     / x     / x      CARDIAC MARKERS ( 02 Jan 2020 00:44 )  x     / <0.01 ng/mL / x     / x     / x      CARDIAC MARKERS ( 01 Jan 2020 13:40 )  x     / <0.01 ng/mL / x     / x     / x          RADIOLOGY:    PHYSICAL EXAM:    GENERAL: NAD, well-developed, AAOx3  HEENT:  Atraumatic, Normocephalic. EOMI, PERRLA, conjunctiva and sclera clear, No JVD  PULMONARY: decreased air entry on left upper lobe.  No wheeze, no crackles  CARDIOVASCULAR: Regular rate and rhythm; No murmurs, rubs, or gallops  GASTROINTESTINAL: Soft, Nontender, Nondistended; Bowel sounds present  MUSCULOSKELETAL:  2+ Peripheral Pulses, No clubbing, cyanosis, or edema  NEUROLOGY: non-focal    ASSESSMENT AND PLAN  69 year old female with Hashimoto's thyroiditis, HTN presenting due to shortness of breath of 5 days duration. Found to have a right main pulmonary artery PE. Echo showed no heart strain. She was started on heparin drip and then switch to eliquis.     #Pulmonary embolism, right main pulmonary artery  - on eliquis.   - no heart strain on echo.  -No clear symptoms of pneumonia, no fever, no WBC count. Will hold off antibiotics (patient has JACLYN opacity)  - need to get spo2 off o2 walking to see if patient will need o2 at home (spo2=88% on room air while walking to bathroom).     #Dizziness  -Meclizine 12.5mg bid    #HTN  -Continue Cardizem 300mg qd, Losartan 100mg qd, Lasix 20mg qd     #Hypothyroidism  -Levothyroxine 150mcg    #Diet: DASH  #DVT PPX: eliquis  #GI PPX: Not indicated  #Dispo: From home  #Activity: bedrest for now  #Full code SHAYAN VILLAFUERTE 69y Female  MRN#: [id number]     SUBJECTIVE  Patient is a 69y old Female who presents with a chief complaint of Shortness of breath (03 Jan 2020 08:00)  Currently admitted to medicine with the primary diagnosis of Pulmonary embolism  Today is hospital day 2d, and this morning she is says she feels short of breath. Desaturating when she walks to the bathroom off oxygen. Otherwise no complaints.   OBJECTIVE  PAST MEDICAL & SURGICAL HISTORY  Hypertension  H/O Hashimoto thyroiditis    ALLERGIES:  No Known Allergies    MEDICATIONS:  STANDING MEDICATIONS  apixaban 10 milliGRAM(s) Oral every 12 hours  chlorhexidine 4% Liquid 1 Application(s) Topical <User Schedule>  diltiazem    milliGRAM(s) Oral daily  levothyroxine 150 MICROGram(s) Oral daily  losartan 100 milliGRAM(s) Oral daily  meclizine 12.5 milliGRAM(s) Oral two times a day  melatonin 5 milliGRAM(s) Oral at bedtime  montelukast 10 milliGRAM(s) Oral daily  pantoprazole    Tablet 40 milliGRAM(s) Oral before breakfast    PRN MEDICATIONS  guaiFENesin   Syrup  (Sugar-Free) 100 milliGRAM(s) Oral every 6 hours PRN      VITAL SIGNS: Last 24 Hours  T(C): 36.6 (03 Jan 2020 05:00), Max: 37.1 (02 Jan 2020 20:27)  T(F): 97.9 (03 Jan 2020 05:00), Max: 98.7 (02 Jan 2020 20:27)  HR: 72 (03 Jan 2020 05:00) (72 - 90)  BP: 108/53 (03 Jan 2020 05:00) (108/53 - 158/74)  BP(mean): 109 (02 Jan 2020 18:00) (84 - 111)  RR: 18 (03 Jan 2020 05:00) (18 - 43)  SpO2: 94% (03 Jan 2020 08:12) (88% - 97%)    LABS:                        10.1   6.96  )-----------( 290      ( 03 Jan 2020 04:30 )             31.9     01-03    141  |  104  |  15  ----------------------------<  103<H>  4.2   |  23  |  0.9    Ca    9.3      03 Jan 2020 04:30  Mg     2.2     01-03    TPro  6.0  /  Alb  3.5  /  TBili  0.4  /  DBili  x   /  AST  15  /  ALT  10  /  AlkPhos  76  01-03    PT/INR - ( 02 Jan 2020 05:02 )   PT: 15.50 sec;   INR: 1.35 ratio         PTT - ( 02 Jan 2020 17:16 )  PTT:64.1 sec      CARDIAC MARKERS ( 02 Jan 2020 05:02 )  x     / <0.01 ng/mL / x     / x     / x      CARDIAC MARKERS ( 02 Jan 2020 00:44 )  x     / <0.01 ng/mL / x     / x     / x      CARDIAC MARKERS ( 01 Jan 2020 13:40 )  x     / <0.01 ng/mL / x     / x     / x          RADIOLOGY:    PHYSICAL EXAM:    GENERAL: NAD, well-developed, AAOx3  HEENT:  Atraumatic, Normocephalic. EOMI, PERRLA, conjunctiva and sclera clear, No JVD  PULMONARY: decreased air entry on left upper lobe.  No wheeze, no crackles  CARDIOVASCULAR: Regular rate and rhythm; No murmurs, rubs, or gallops  GASTROINTESTINAL: Soft, Nontender, Nondistended; Bowel sounds present  MUSCULOSKELETAL:  2+ Peripheral Pulses, No clubbing, cyanosis, or edema  NEUROLOGY: non-focal    ASSESSMENT AND PLAN  69 year old female with Hashimoto's thyroiditis, HTN presenting due to shortness of breath of 5 days duration. Found to have a right main pulmonary artery PE. Echo showed no heart strain. She was started on heparin drip and then switch to eliquis.     #Pulmonary embolism, right main pulmonary artery  - on eliquis.   - no heart strain on echo.  -No clear symptoms of pneumonia, no fever, no WBC count. Will hold off antibiotics (patient has JACLYN opacity)  - need to get spo2 off o2 walking to see if patient will need o2 at home (spo2=88% on room air while walking to bathroom).     #Dizziness  -Meclizine 12.5mg bid    #HTN  -Continue Cardizem 300mg qd, Losartan 100mg qd, Lasix 20mg qd     #Hypothyroidism  -Levothyroxine 150mcg    #Diet: DASH  #DVT PPX: eliquis  #GI PPX: Not indicated  #Dispo: From home  #Activity: bedrest for now  #Full code        Patient SPO2 =90% on room air at rest. On ambulation her room air is =86%. On 2L O2 her SPO2=96%. Patient had pulmonary embolism and will need O2 at home. Patient was tested in a chronic stable condition.

## 2020-01-03 NOTE — PROGRESS NOTE ADULT - SUBJECTIVE AND OBJECTIVE BOX
Patient was seen and examined. Spoke with RN. Chart reviewed.  No events overnight.  Vital Signs Last 24 Hrs  T(F): 97.9 (03 Jan 2020 05:00), Max: 98.7 (02 Jan 2020 20:27)  HR: 72 (03 Jan 2020 05:00) (72 - 90)  BP: 108/53 (03 Jan 2020 05:00) (108/53 - 158/74)  SpO2: 94% (03 Jan 2020 08:12) (88% - 97%)  MEDICATIONS  (STANDING):  apixaban 10 milliGRAM(s) Oral every 12 hours  chlorhexidine 4% Liquid 1 Application(s) Topical <User Schedule>  diltiazem    milliGRAM(s) Oral daily  levothyroxine 150 MICROGram(s) Oral daily  losartan 100 milliGRAM(s) Oral daily  meclizine 12.5 milliGRAM(s) Oral two times a day  melatonin 5 milliGRAM(s) Oral at bedtime  montelukast 10 milliGRAM(s) Oral daily  pantoprazole    Tablet 40 milliGRAM(s) Oral before breakfast    MEDICATIONS  (PRN):  guaiFENesin   Syrup  (Sugar-Free) 100 milliGRAM(s) Oral every 6 hours PRN Cough    Labs:                        10.1   6.96  )-----------( 290      ( 03 Jan 2020 04:30 )             31.9                         10.3   8.68  )-----------( 283      ( 02 Jan 2020 05:02 )             33.0     03 Jan 2020 04:30    141    |  104    |  15     ----------------------------<  103    4.2     |  23     |  0.9    02 Jan 2020 05:02    143    |  106    |  10     ----------------------------<  109    4.1     |  23     |  0.9      Ca    9.3        03 Jan 2020 04:30  Ca    9.2        02 Jan 2020 05:02  Mg     2.2       03 Jan 2020 04:30    TPro  6.0    /  Alb  3.5    /  TBili  0.4    /  DBili  x      /  AST  15     /  ALT  10     /  AlkPhos  76     03 Jan 2020 04:30  TPro  6.7    /  Alb  4.1    /  TBili  0.3    /  DBili  x      /  AST  19     /  ALT  13     /  AlkPhos  83     01 Jan 2020 13:40    PT/INR - ( 02 Jan 2020 05:02 )   PT: 15.50 sec;   INR: 1.35 ratio         PTT - ( 02 Jan 2020 17:16 )  PTT:64.1 sec      General: comfortable, NAD  Neurology: A&Ox3, nonfocal  Head:  Normocephalic, atraumatic  ENT:  Mucosa moist, no ulcerations  Neck:  Supple, no JVD,   Skin: no breakdowns (as per RN)  Resp: decreased air entry   CV: RRR, S1S2,   GI: Soft, NT, bowel sounds  MS: No edema, + peripheral pulses, FROM all 4 extremity        A/P:  69 year old female with Hashimoto's thyroiditis, HTN presenting due to shortness of breath admitted to ICU with acute B/L Pulmonary embolism    still dyspneic and dizzy on ambulation    monitor resp status   fall precautions   anticoagulation  IR eval noted, no intervention at this time   pulm f/u   amb O2, may require home O2   O2 as needed  d/c planning when cleared by pulm   DVT prophylaxis  Decubitus prevention- all measures as per RN protocol  Please call or text me with any questions or updates

## 2020-01-03 NOTE — DISCHARGE NOTE NURSING/CASE MANAGEMENT/SOCIAL WORK - PATIENT PORTAL LINK FT
You can access the FollowMyHealth Patient Portal offered by [facility] by registering at the following website: [email or web address]. By joining [facility] FollowMyHealth portal, you will also be able to view your health information using other applications (apps) compatible with our system.

## 2020-01-03 NOTE — DISCHARGE NOTE NURSING/CASE MANAGEMENT/SOCIAL WORK - NSDCPEWEB_GEN_ALL_CORE
[facility] website --- [email or web address] --- [email or web address]

## 2020-01-03 NOTE — PROGRESS NOTE ADULT - SUBJECTIVE AND OBJECTIVE BOX
Interventional Radiology Follow- Up Note      69y Female with R main PE, with no hemodynamic decline since presentation on Hep gtt, no LE DVT, cardiac echo w/o exidence of Rigth heart strain.     O/N:  s/p downgrade   No complaints offered.    Vitals: T(F): 97.9 (01-03-20 @ 05:00), Max: 98.7 (01-02-20 @ 20:27)  HR: 72 (01-03-20 @ 05:00) (72 - 90)  BP: 108/53 (01-03-20 @ 05:00) (108/53 - 158/74)  RR: 18 (01-03-20 @ 05:00) (18 - 43)  SpO2: 95% (01-02-20 @ 20:31) (92% - 97%)  Wt(kg): --    LABS:                        10.1   6.96  )-----------( 290      ( 03 Jan 2020 04:30 )             31.9     01-03    141  |  104  |  15  ----------------------------<  103<H>  4.2   |  23  |  0.9    Ca    9.3      03 Jan 2020 04:30  Mg     2.2     01-03    TPro  6.0  /  Alb  3.5  /  TBili  0.4  /  DBili  x   /  AST  15  /  ALT  10  /  AlkPhos  76  01-03    PT/INR - ( 02 Jan 2020 05:02 )   PT: 15.50 sec;   INR: 1.35 ratio         PTT - ( 02 Jan 2020 17:16 )  PTT:64.1 sec    Impression: 69y Female admitted with PULMONARY EMBOLISM      Plan:  No plans for Ir intervention at this time.   Please call with questions  Cont anticoagulation     Please call Interventional Radiology [phone] with any questions, concerns, or issues regarding above.

## 2020-01-03 NOTE — DISCHARGE NOTE PROVIDER - CARE PROVIDER_API CALL
Don Avila (MD)  Critical Care Medicine; Pulmonary Disease; Sleep Medicine  [address] [place] [address]  Phone: [phone]  Fax: [phone]  Follow Up Time:     Eric Brantley (DO)  Infectious Disease; Internal Medicine  [address] [place] [id number]  Phone: [phone]  Fax: [phone]  Follow Up Time:

## 2020-01-04 LAB
ANION GAP SERPL CALC-SCNC: 14 MMOL/L — SIGNIFICANT CHANGE UP (ref 7–14)
BASOPHILS # BLD AUTO: 0.06 K/UL — SIGNIFICANT CHANGE UP (ref 0–0.2)
BASOPHILS NFR BLD AUTO: 1 % — SIGNIFICANT CHANGE UP (ref 0–1)
BUN SERPL-MCNC: 15 MG/DL — SIGNIFICANT CHANGE UP (ref 10–20)
CALCIUM SERPL-MCNC: 9 MG/DL — SIGNIFICANT CHANGE UP (ref 8.5–10.1)
CHLORIDE SERPL-SCNC: 102 MMOL/L — SIGNIFICANT CHANGE UP (ref 98–110)
CO2 SERPL-SCNC: 22 MMOL/L — SIGNIFICANT CHANGE UP (ref 17–32)
CREAT SERPL-MCNC: 0.8 MG/DL — SIGNIFICANT CHANGE UP (ref 0.7–1.5)
EOSINOPHIL # BLD AUTO: 0.14 K/UL — SIGNIFICANT CHANGE UP (ref 0–0.7)
EOSINOPHIL NFR BLD AUTO: 2.3 % — SIGNIFICANT CHANGE UP (ref 0–8)
GLUCOSE SERPL-MCNC: 94 MG/DL — SIGNIFICANT CHANGE UP (ref 70–99)
HCT VFR BLD CALC: 32.3 % — LOW (ref 37–47)
HGB BLD-MCNC: 10 G/DL — LOW (ref 12–16)
IMM GRANULOCYTES NFR BLD AUTO: 0.3 % — SIGNIFICANT CHANGE UP (ref 0.1–0.3)
LYMPHOCYTES # BLD AUTO: 1.9 K/UL — SIGNIFICANT CHANGE UP (ref 1.2–3.4)
LYMPHOCYTES # BLD AUTO: 31.6 % — SIGNIFICANT CHANGE UP (ref 20.5–51.1)
MCHC RBC-ENTMCNC: 27.4 PG — SIGNIFICANT CHANGE UP (ref 27–31)
MCHC RBC-ENTMCNC: 31 G/DL — LOW (ref 32–37)
MCV RBC AUTO: 88.5 FL — SIGNIFICANT CHANGE UP (ref 81–99)
MONOCYTES # BLD AUTO: 0.79 K/UL — HIGH (ref 0.1–0.6)
MONOCYTES NFR BLD AUTO: 13.1 % — HIGH (ref 1.7–9.3)
NEUTROPHILS # BLD AUTO: 3.1 K/UL — SIGNIFICANT CHANGE UP (ref 1.4–6.5)
NEUTROPHILS NFR BLD AUTO: 51.7 % — SIGNIFICANT CHANGE UP (ref 42.2–75.2)
NRBC # BLD: 0 /100 WBCS — SIGNIFICANT CHANGE UP (ref 0–0)
PLATELET # BLD AUTO: 288 K/UL — SIGNIFICANT CHANGE UP (ref 130–400)
POTASSIUM SERPL-MCNC: 3.7 MMOL/L — SIGNIFICANT CHANGE UP (ref 3.5–5)
POTASSIUM SERPL-SCNC: 3.7 MMOL/L — SIGNIFICANT CHANGE UP (ref 3.5–5)
RBC # BLD: 3.65 M/UL — LOW (ref 4.2–5.4)
RBC # FLD: 12.7 % — SIGNIFICANT CHANGE UP (ref 11.5–14.5)
SODIUM SERPL-SCNC: 138 MMOL/L — SIGNIFICANT CHANGE UP (ref 135–146)
WBC # BLD: 6.01 K/UL — SIGNIFICANT CHANGE UP (ref 4.8–10.8)
WBC # FLD AUTO: 6.01 K/UL — SIGNIFICANT CHANGE UP (ref 4.8–10.8)

## 2020-01-04 RX ORDER — GUAIFENESIN/DEXTROMETHORPHAN 600MG-30MG
100 TABLET, EXTENDED RELEASE 12 HR ORAL EVERY 6 HOURS
Refills: 0 | Status: DISCONTINUED | OUTPATIENT
Start: 2020-01-04 | End: 2020-01-07

## 2020-01-04 RX ADMIN — LOSARTAN POTASSIUM 100 MILLIGRAM(S): 100 TABLET, FILM COATED ORAL at 06:04

## 2020-01-04 RX ADMIN — APIXABAN 10 MILLIGRAM(S): 2.5 TABLET, FILM COATED ORAL at 17:16

## 2020-01-04 RX ADMIN — PANTOPRAZOLE SODIUM 40 MILLIGRAM(S): 20 TABLET, DELAYED RELEASE ORAL at 06:04

## 2020-01-04 RX ADMIN — APIXABAN 10 MILLIGRAM(S): 2.5 TABLET, FILM COATED ORAL at 06:04

## 2020-01-04 RX ADMIN — Medication 100 MILLILITER(S): at 22:05

## 2020-01-04 RX ADMIN — Medication 5 MILLIGRAM(S): at 21:09

## 2020-01-04 RX ADMIN — Medication 150 MICROGRAM(S): at 06:04

## 2020-01-04 RX ADMIN — MONTELUKAST 10 MILLIGRAM(S): 4 TABLET, CHEWABLE ORAL at 11:30

## 2020-01-04 RX ADMIN — Medication 300 MILLIGRAM(S): at 21:09

## 2020-01-04 RX ADMIN — Medication 12.5 MILLIGRAM(S): at 17:16

## 2020-01-05 LAB
ANION GAP SERPL CALC-SCNC: 13 MMOL/L — SIGNIFICANT CHANGE UP (ref 7–14)
BASOPHILS # BLD AUTO: 0.07 K/UL — SIGNIFICANT CHANGE UP (ref 0–0.2)
BASOPHILS NFR BLD AUTO: 1.1 % — HIGH (ref 0–1)
BUN SERPL-MCNC: 16 MG/DL — SIGNIFICANT CHANGE UP (ref 10–20)
CALCIUM SERPL-MCNC: 8.9 MG/DL — SIGNIFICANT CHANGE UP (ref 8.5–10.1)
CHLORIDE SERPL-SCNC: 107 MMOL/L — SIGNIFICANT CHANGE UP (ref 98–110)
CO2 SERPL-SCNC: 22 MMOL/L — SIGNIFICANT CHANGE UP (ref 17–32)
CREAT SERPL-MCNC: 0.8 MG/DL — SIGNIFICANT CHANGE UP (ref 0.7–1.5)
EOSINOPHIL # BLD AUTO: 0.21 K/UL — SIGNIFICANT CHANGE UP (ref 0–0.7)
EOSINOPHIL NFR BLD AUTO: 3.2 % — SIGNIFICANT CHANGE UP (ref 0–8)
GLUCOSE SERPL-MCNC: 88 MG/DL — SIGNIFICANT CHANGE UP (ref 70–99)
HCT VFR BLD CALC: 31.1 % — LOW (ref 37–47)
HGB BLD-MCNC: 9.4 G/DL — LOW (ref 12–16)
IMM GRANULOCYTES NFR BLD AUTO: 0.3 % — SIGNIFICANT CHANGE UP (ref 0.1–0.3)
LYMPHOCYTES # BLD AUTO: 2.23 K/UL — SIGNIFICANT CHANGE UP (ref 1.2–3.4)
LYMPHOCYTES # BLD AUTO: 34.2 % — SIGNIFICANT CHANGE UP (ref 20.5–51.1)
MCHC RBC-ENTMCNC: 26.9 PG — LOW (ref 27–31)
MCHC RBC-ENTMCNC: 30.2 G/DL — LOW (ref 32–37)
MCV RBC AUTO: 88.9 FL — SIGNIFICANT CHANGE UP (ref 81–99)
MONOCYTES # BLD AUTO: 0.8 K/UL — HIGH (ref 0.1–0.6)
MONOCYTES NFR BLD AUTO: 12.3 % — HIGH (ref 1.7–9.3)
NEUTROPHILS # BLD AUTO: 3.19 K/UL — SIGNIFICANT CHANGE UP (ref 1.4–6.5)
NEUTROPHILS NFR BLD AUTO: 48.9 % — SIGNIFICANT CHANGE UP (ref 42.2–75.2)
NRBC # BLD: 0 /100 WBCS — SIGNIFICANT CHANGE UP (ref 0–0)
PLATELET # BLD AUTO: 284 K/UL — SIGNIFICANT CHANGE UP (ref 130–400)
POTASSIUM SERPL-MCNC: 4 MMOL/L — SIGNIFICANT CHANGE UP (ref 3.5–5)
POTASSIUM SERPL-SCNC: 4 MMOL/L — SIGNIFICANT CHANGE UP (ref 3.5–5)
RBC # BLD: 3.5 M/UL — LOW (ref 4.2–5.4)
RBC # FLD: 12.6 % — SIGNIFICANT CHANGE UP (ref 11.5–14.5)
SODIUM SERPL-SCNC: 142 MMOL/L — SIGNIFICANT CHANGE UP (ref 135–146)
WBC # BLD: 6.52 K/UL — SIGNIFICANT CHANGE UP (ref 4.8–10.8)
WBC # FLD AUTO: 6.52 K/UL — SIGNIFICANT CHANGE UP (ref 4.8–10.8)

## 2020-01-05 RX ORDER — PANTOPRAZOLE SODIUM 20 MG/1
1 TABLET, DELAYED RELEASE ORAL
Qty: 30 | Refills: 0
Start: 2020-01-05 | End: 2020-02-03

## 2020-01-05 RX ORDER — GUAIFENESIN/DEXTROMETHORPHAN 600MG-30MG
100 TABLET, EXTENDED RELEASE 12 HR ORAL
Qty: 3000 | Refills: 0
Start: 2020-01-05 | End: 2020-02-03

## 2020-01-05 RX ADMIN — Medication 5 MILLIGRAM(S): at 21:44

## 2020-01-05 RX ADMIN — LOSARTAN POTASSIUM 100 MILLIGRAM(S): 100 TABLET, FILM COATED ORAL at 06:21

## 2020-01-05 RX ADMIN — PANTOPRAZOLE SODIUM 40 MILLIGRAM(S): 20 TABLET, DELAYED RELEASE ORAL at 06:21

## 2020-01-05 RX ADMIN — MONTELUKAST 10 MILLIGRAM(S): 4 TABLET, CHEWABLE ORAL at 11:22

## 2020-01-05 RX ADMIN — Medication 150 MICROGRAM(S): at 06:21

## 2020-01-05 RX ADMIN — APIXABAN 10 MILLIGRAM(S): 2.5 TABLET, FILM COATED ORAL at 17:11

## 2020-01-05 RX ADMIN — Medication 12.5 MILLIGRAM(S): at 06:21

## 2020-01-05 RX ADMIN — Medication 300 MILLIGRAM(S): at 21:44

## 2020-01-05 RX ADMIN — APIXABAN 10 MILLIGRAM(S): 2.5 TABLET, FILM COATED ORAL at 06:21

## 2020-01-05 RX ADMIN — Medication 12.5 MILLIGRAM(S): at 17:12

## 2020-01-05 NOTE — PROGRESS NOTE ADULT - SUBJECTIVE AND OBJECTIVE BOX
SHAYAN VILLAFUERTE 69y Female  MRN#: [id number]     SUBJECTIVE  Patient is a 69y old Female who presents with a chief complaint of Shortness of breath (03 Jan 2020 08:00)  Currently admitted to medicine with the primary diagnosis of Pulmonary embolism  Today is hospital day 4d, and this morning she is says she feels better. Still has cough   OBJECTIVE  PAST MEDICAL & SURGICAL HISTORY  Hypertension  H/O Hashimoto thyroiditis    ALLERGIES:  No Known Allergies    MEDICATIONS:  MEDICATIONS  (STANDING):  apixaban 10 milliGRAM(s) Oral every 12 hours  chlorhexidine 4% Liquid 1 Application(s) Topical <User Schedule>  diltiazem    milliGRAM(s) Oral daily  levothyroxine 150 MICROGram(s) Oral daily  losartan 100 milliGRAM(s) Oral daily  meclizine 12.5 milliGRAM(s) Oral two times a day  melatonin 5 milliGRAM(s) Oral at bedtime  montelukast 10 milliGRAM(s) Oral daily  pantoprazole    Tablet 40 milliGRAM(s) Oral before breakfast    MEDICATIONS  (PRN):  guaifenesin/dextromethorphan  Syrup 100 milliLiter(s) Oral every 6 hours PRN Cough    VITAL SIGNS: Last 24 Hours  Vital Signs Last 24 Hrs  T(C): 36.1 (05 Jan 2020 05:40), Max: 36.8 (04 Jan 2020 21:00)  T(F): 97 (05 Jan 2020 05:40), Max: 98.2 (04 Jan 2020 21:00)  HR: 61 (05 Jan 2020 05:40) (61 - 90)  BP: 128/65 (05 Jan 2020 05:40) (126/62 - 142/67)  BP(mean): --  RR: 18 (05 Jan 2020 05:40) (18 - 18)  SpO2: --    LABS:        RADIOLOGY:    PHYSICAL EXAM:    GENERAL: NAD, well-developed, AAOx3  HEENT:  Atraumatic, Normocephalic. EOMI, PERRLA, conjunctiva and sclera clear, No JVD  PULMONARY: decreased air entry on left upper lobe.  No wheeze, no crackles  CARDIOVASCULAR: Regular rate and rhythm; No murmurs, rubs, or gallops  GASTROINTESTINAL: Soft, Nontender, Nondistended; Bowel sounds present  MUSCULOSKELETAL:  2+ Peripheral Pulses, No clubbing, cyanosis, or edema  NEUROLOGY: non-focal    ASSESSMENT AND PLAN  69 year old female with Hashimoto's thyroiditis, HTN presenting due to shortness of breath of 5 days duration. Found to have a right main pulmonary artery PE. Echo showed no heart strain. She was started on heparin drip and then switch to eliquis.     #Pulmonary embolism, right main pulmonary artery  - spo2 on room air at rest now =90%  - on eliquis.   - no heart strain on echo.  - No clear symptoms of pneumonia, no fever, no WBC count. Will hold off antibiotics (patient has JACLYN opacity)  - pending home o2 approval     #Dizziness  -Meclizine 12.5mg bid    #HTN  -Continue Cardizem 300mg qd, Losartan 100mg qd, Lasix 20mg qd     #Hypothyroidism  -Levothyroxine 150mcg    #Diet: DASH  #DVT PPX: eliquis  #Dispo: From home  #Activity: as tolerated  #Full code    Patient SPO2 =90% on room air at rest. On ambulation her room air is =86%. On 2L O2 her SPO2=96%. Patient had pulmonary embolism and will need O2 at home. Patient was tested in a chronic stable condition. SHAYAN VILLAFUERTE 69y Female  MRN#: [id number]     SUBJECTIVE  Patient is a 69y old Female who presents with a chief complaint of Shortness of breath (03 Jan 2020 08:00)  Currently admitted to medicine with the primary diagnosis of Pulmonary embolism  Today is hospital day 4d, and this morning she is says she feels better. Still has cough   OBJECTIVE  PAST MEDICAL & SURGICAL HISTORY  Hypertension  H/O Hashimoto thyroiditis    ALLERGIES:  No Known Allergies    MEDICATIONS:  MEDICATIONS  (STANDING):  apixaban 10 milliGRAM(s) Oral every 12 hours  chlorhexidine 4% Liquid 1 Application(s) Topical <User Schedule>  diltiazem    milliGRAM(s) Oral daily  levothyroxine 150 MICROGram(s) Oral daily  losartan 100 milliGRAM(s) Oral daily  meclizine 12.5 milliGRAM(s) Oral two times a day  melatonin 5 milliGRAM(s) Oral at bedtime  montelukast 10 milliGRAM(s) Oral daily  pantoprazole    Tablet 40 milliGRAM(s) Oral before breakfast    MEDICATIONS  (PRN):  guaifenesin/dextromethorphan  Syrup 100 milliLiter(s) Oral every 6 hours PRN Cough    VITAL SIGNS: Last 24 Hours  Vital Signs Last 24 Hrs  T(C): 36.1 (05 Jan 2020 05:40), Max: 36.8 (04 Jan 2020 21:00)  T(F): 97 (05 Jan 2020 05:40), Max: 98.2 (04 Jan 2020 21:00)  HR: 61 (05 Jan 2020 05:40) (61 - 90)  BP: 128/65 (05 Jan 2020 05:40) (126/62 - 142/67)  BP(mean): --  RR: 18 (05 Jan 2020 05:40) (18 - 18)  SpO2: --    LABS:                        9.4    6.52  )-----------( 284      ( 05 Jan 2020 06:22 )             31.1   01-05    142  |  107  |  16  ----------------------------<  88  4.0   |  22  |  0.8    Ca    8.9      05 Jan 2020 06:22    RADIOLOGY:    PHYSICAL EXAM:    GENERAL: NAD, well-developed, AAOx3  HEENT:  Atraumatic, Normocephalic. EOMI, PERRLA, conjunctiva and sclera clear, No JVD  PULMONARY: decreased air entry on left upper lobe.  No wheeze, no crackles  CARDIOVASCULAR: Regular rate and rhythm; No murmurs, rubs, or gallops  GASTROINTESTINAL: Soft, Nontender, Nondistended; Bowel sounds present  MUSCULOSKELETAL:  2+ Peripheral Pulses, No clubbing, cyanosis, or edema  NEUROLOGY: non-focal    ASSESSMENT AND PLAN  69 year old female with Hashimoto's thyroiditis, HTN presenting due to shortness of breath of 5 days duration. Found to have a right main pulmonary artery PE. Echo showed no heart strain. She was started on heparin drip and then switch to eliquis.     #Pulmonary embolism, right main pulmonary artery  - spo2 on room air at rest now =90%  - on eliquis.   - no heart strain on echo.  - No clear symptoms of pneumonia, no fever, no WBC count. Will hold off antibiotics (patient has JACLYN opacity)  - pending home o2 approval     #Dizziness  -Meclizine 12.5mg bid    #HTN  -Continue Cardizem 300mg qd, Losartan 100mg qd    #Hypothyroidism  -Levothyroxine 150mcg    #Diet: DASH  #DVT PPX: eliquis  #Dispo: From home. Pending o2 therapy approval, otherwise ready for dc  #Activity: as tolerated    Patient SPO2 =90% on room air at rest. On ambulation her room air is =86%. On 2L O2 her SPO2=96%. Patient had pulmonary embolism and will need O2 at home. Patient was tested in a chronic stable condition.

## 2020-01-05 NOTE — PROGRESS NOTE ADULT - SUBJECTIVE AND OBJECTIVE BOX
Patient was seen and examined. Spoke with RN. Chart reviewed.  States she feels "exhausted". Also complains of some postprandial abdominal discomfort, as well as mild rectal bleeding since starting AC.   No events overnight.    Vital Signs Last 24 Hrs  T(F): 97 (05 Jan 2020 05:40), Max: 98.2 (04 Jan 2020 21:00)  HR: 61 (05 Jan 2020 05:40) (61 - 90)  BP: 128/65 (05 Jan 2020 05:40) (126/62 - 142/67)  SpO2: --    MEDICATIONS  (STANDING):  apixaban 10 milliGRAM(s) Oral every 12 hours  chlorhexidine 4% Liquid 1 Application(s) Topical <User Schedule>  diltiazem    milliGRAM(s) Oral daily  levothyroxine 150 MICROGram(s) Oral daily  losartan 100 milliGRAM(s) Oral daily  meclizine 12.5 milliGRAM(s) Oral two times a day  melatonin 5 milliGRAM(s) Oral at bedtime  montelukast 10 milliGRAM(s) Oral daily  pantoprazole    Tablet 40 milliGRAM(s) Oral before breakfast    MEDICATIONS  (PRN):  guaifenesin/dextromethorphan  Syrup 100 milliLiter(s) Oral every 6 hours PRN Cough    Labs:                        9.4    6.52  )-----------( 284      ( 05 Jan 2020 06:22 )             31.1                         10.0   6.01  )-----------( 288      ( 04 Jan 2020 07:44 )             32.3     05 Jan 2020 06:22    142    |  107    |  16     ----------------------------<  88     4.0     |  22     |  0.8    04 Jan 2020 07:44    138    |  102    |  15     ----------------------------<  94     3.7     |  22     |  0.8      Ca    8.9        05 Jan 2020 06:22  Ca    9.0        04 Jan 2020 07:44            General: Appears mildly SOB, otherwise comfortable, in NAD  Neurology: A&Ox3, nonfocal  Head:  Normocephalic, atraumatic  ENT:  Mucosa moist, no ulcerations  Neck:  Supple, no JVD,   Skin: no breakdowns (as per RN)  Resp: CTA B/L  CV: RRR, S1S2, no M/R/G  GI: Soft, NT, bowel sounds  MS: No edema, + peripheral pulses, FROM all 4 extremity      A/P:  69 year old female with Hashimoto's thyroiditis, HTN presenting due to shortness of breath admitted to ICU with acute B/L Pulmonary embolism. Downgraded, on AC. Also found JACLYN opacity suspicious for malignancy.    still dyspneic and dizzy on ambulation    monitor resp status  fall precautions   continue anticoagulation, monitor for bleeding events  pulm f/u   OP FU for JACLYN nodule  amb O2, may require home O2, scripts sent, pending approval  O2 as needed  d/c planning  DVT prophylaxis  Discussed with covering resident  Decubitus prevention- all measures as per RN protocol  Please call or text me with any questions or updates

## 2020-01-06 RX ADMIN — Medication 300 MILLIGRAM(S): at 21:39

## 2020-01-06 RX ADMIN — LOSARTAN POTASSIUM 100 MILLIGRAM(S): 100 TABLET, FILM COATED ORAL at 06:25

## 2020-01-06 RX ADMIN — Medication 5 MILLIGRAM(S): at 21:39

## 2020-01-06 RX ADMIN — APIXABAN 10 MILLIGRAM(S): 2.5 TABLET, FILM COATED ORAL at 06:25

## 2020-01-06 RX ADMIN — Medication 150 MICROGRAM(S): at 06:25

## 2020-01-06 RX ADMIN — CHLORHEXIDINE GLUCONATE 1 APPLICATION(S): 213 SOLUTION TOPICAL at 06:25

## 2020-01-06 RX ADMIN — PANTOPRAZOLE SODIUM 40 MILLIGRAM(S): 20 TABLET, DELAYED RELEASE ORAL at 06:25

## 2020-01-06 RX ADMIN — APIXABAN 10 MILLIGRAM(S): 2.5 TABLET, FILM COATED ORAL at 17:33

## 2020-01-06 RX ADMIN — MONTELUKAST 10 MILLIGRAM(S): 4 TABLET, CHEWABLE ORAL at 11:12

## 2020-01-06 RX ADMIN — Medication 12.5 MILLIGRAM(S): at 21:39

## 2020-01-06 RX ADMIN — Medication 12.5 MILLIGRAM(S): at 06:25

## 2020-01-06 NOTE — PROGRESS NOTE ADULT - SUBJECTIVE AND OBJECTIVE BOX
Patient was seen and examined. Spoke with RN. Chart reviewed.  cm to check 02 sat on ambulation for home o2  then dc home     No events overnight.  Vital Signs Last 24 Hrs  T(F): 97.2 (06 Jan 2020 04:56), Max: 98.5 (05 Jan 2020 21:30)  HR: 59 (06 Jan 2020 04:56) (59 - 80)  BP: 128/64 (06 Jan 2020 04:56) (128/64 - 137/66)  SpO2: 96% (06 Jan 2020 08:11) (96% - 96%)  MEDICATIONS  (STANDING):  apixaban 10 milliGRAM(s) Oral every 12 hours  chlorhexidine 4% Liquid 1 Application(s) Topical <User Schedule>  diltiazem    milliGRAM(s) Oral daily  levothyroxine 150 MICROGram(s) Oral daily  losartan 100 milliGRAM(s) Oral daily  meclizine 12.5 milliGRAM(s) Oral two times a day  melatonin 5 milliGRAM(s) Oral at bedtime  montelukast 10 milliGRAM(s) Oral daily  pantoprazole    Tablet 40 milliGRAM(s) Oral before breakfast    MEDICATIONS  (PRN):  guaifenesin/dextromethorphan  Syrup 100 milliLiter(s) Oral every 6 hours PRN Cough    Labs:                        9.4    6.52  )-----------( 284      ( 05 Jan 2020 06:22 )             31.1     05 Jan 2020 06:22    142    |  107    |  16     ----------------------------<  88     4.0     |  22     |  0.8      Ca    8.9        05 Jan 2020 06:22              Radiology:    General: comfortable, NAD  Neurology: A&Ox3, nonfocal  Head:  Normocephalic, atraumatic  ENT:  Mucosa moist, no ulcerations  Neck:  Supple, no JVD,   Skin: no breakdowns (as per RN)  Resp: CTA B/L  CV: RRR, S1S2,   GI: Soft, NT, bowel sounds  MS: No edema, + peripheral pulses, FROM all 4 extremity

## 2020-01-06 NOTE — CHART NOTE - NSCHARTNOTEFT_GEN_A_CORE
<<<RESIDENT DISCHARGE NOTE>>>     SHAYAN VILLAFUERTE  MRN-[id number]    VITAL SIGNS:  T(F): 96.8 (01-06-20 @ 14:30), Max: 98.5 (01-05-20 @ 21:30)  HR: 73 (01-06-20 @ 14:30)  BP: 133/65 (01-06-20 @ 14:30)  SpO2: 96% (01-06-20 @ 08:11)      T(C): 36 (01-06-20 @ 14:30), Max: 36.9 (01-05-20 @ 21:30)  HR: 73 (01-06-20 @ 14:30) (59 - 80)  BP: 133/65 (01-06-20 @ 14:30) (128/64 - 137/66)  RR: 18 (01-06-20 @ 14:30) (18 - 18)  SpO2: 96% (01-06-20 @ 08:11) (96% - 96%)    GENERAL: NAD, well-developed, 69y sitting up in bed, awake  EENT: EOMI, conjunctiva and sclera clear, No nasal obstruction or discharge  RESPIRATORY: Clear to auscultation bilaterally; No wheeze or crackles, +coughing with deep breaths, on NC breathing comfortably  CARDIOVASCULAR: Regular rate and rhythm; No murmurs, rubs, or gallops, no LE edema  GASTROINTESTINAL: Abdomen Soft, Nontender, Nondistended; Bowel sounds present  MUSCULOSKELETAL:  No cyanosis, extremities grossly symmetrical  PSYCH: AAO, affect appropriate  NEUROLOGY: non-focal, cognition intact, MAEE    TEST RESULTS:                        9.4    6.52  )-----------( 284      ( 05 Jan 2020 06:22 )             31.1       01-05    142  |  107  |  16  ----------------------------<  88  4.0   |  22  |  0.8    Ca    8.9      05 Jan 2020 06:22        FINAL DISCHARGE INTERVIEW:  Resident(s) Present: (Name:________Maryjane_____), RN Present: (Name:  ___________)    DISCHARGE MEDICATION RECONCILIATION  reviewed with Attending (Name:Kate__________)    DISPOSITION:   [  ] Home,    [X  ] Home with Visiting Nursing Services,   [    ]  SNF/ NH,    [   ] Acute Rehab (4A),   [   ] Other (Specify:_________)

## 2020-01-07 VITALS
HEART RATE: 67 BPM | TEMPERATURE: 98 F | SYSTOLIC BLOOD PRESSURE: 134 MMHG | DIASTOLIC BLOOD PRESSURE: 63 MMHG | RESPIRATION RATE: 18 BRPM

## 2020-01-07 RX ADMIN — APIXABAN 10 MILLIGRAM(S): 2.5 TABLET, FILM COATED ORAL at 05:14

## 2020-01-07 RX ADMIN — MONTELUKAST 10 MILLIGRAM(S): 4 TABLET, CHEWABLE ORAL at 11:32

## 2020-01-07 RX ADMIN — LOSARTAN POTASSIUM 100 MILLIGRAM(S): 100 TABLET, FILM COATED ORAL at 05:14

## 2020-01-07 RX ADMIN — Medication 150 MICROGRAM(S): at 05:14

## 2020-01-07 RX ADMIN — PANTOPRAZOLE SODIUM 40 MILLIGRAM(S): 20 TABLET, DELAYED RELEASE ORAL at 05:14

## 2020-01-07 NOTE — PROGRESS NOTE ADULT - SUBJECTIVE AND OBJECTIVE BOX
Patient was seen and examined. Spoke with RN. Chart reviewed.    No events overnight.  Vital Signs Last 24 Hrs  T(F): 97.7 (07 Jan 2020 05:00), Max: 97.7 (06 Jan 2020 22:30)  HR: 67 (07 Jan 2020 05:00) (67 - 82)  BP: 134/63 (07 Jan 2020 05:00) (133/65 - 152/90)  SpO2: --  MEDICATIONS  (STANDING):  apixaban 10 milliGRAM(s) Oral every 12 hours  chlorhexidine 4% Liquid 1 Application(s) Topical <User Schedule>  diltiazem    milliGRAM(s) Oral daily  levothyroxine 150 MICROGram(s) Oral daily  losartan 100 milliGRAM(s) Oral daily  meclizine 12.5 milliGRAM(s) Oral two times a day  melatonin 5 milliGRAM(s) Oral at bedtime  montelukast 10 milliGRAM(s) Oral daily  pantoprazole    Tablet 40 milliGRAM(s) Oral before breakfast    MEDICATIONS  (PRN):  guaifenesin/dextromethorphan  Syrup 100 milliLiter(s) Oral every 6 hours PRN Cough    Labs:                  Radiology:    General: comfortable, NAD  Neurology: A&Ox3, nonfocal  Head:  Normocephalic, atraumatic  ENT:  Mucosa moist, no ulcerations  Neck:  Supple, no JVD,   Skin: no breakdowns (as per RN)  Resp: CTA B/L  CV: RRR, S1S2,   GI: Soft, NT, bowel sounds  MS: No edema, + peripheral pulses, FROM all 4 extremity

## 2020-01-07 NOTE — CHART NOTE - NSCHARTNOTEFT_GEN_A_CORE
<<<RESIDENT DISCHARGE NOTE>>>     SHAYAN VILLAFUERTE  MRN-[id number]    VITAL SIGNS:  T(F): 97.7 (01-07-20 @ 05:00), Max: 97.7 (01-06-20 @ 22:30)  HR: 67 (01-07-20 @ 05:00)  BP: 134/63 (01-07-20 @ 05:00)  SpO2: --      T(C): 36.5 (01-07-20 @ 05:00), Max: 36.5 (01-06-20 @ 22:30)  HR: 67 (01-07-20 @ 05:00) (67 - 82)  BP: 134/63 (01-07-20 @ 05:00) (133/65 - 152/90)  RR: 18 (01-07-20 @ 05:00) (18 - 18)  SpO2: --    GENERAL: NAD, well-developed, 69y sitting up in bed  EENT: EOMI, conjunctiva and sclera clear, No nasal obstruction or discharge  RESPIRATORY: Clear to auscultation bilaterally; No wheeze or crackles, on room air breathing comfortably; +cough with deep inspiration  CARDIOVASCULAR: Regular rate and rhythm; No murmurs, rubs, or gallops, no LE edema  GASTROINTESTINAL: Abdomen Soft, Nontender, Nondistended; Bowel sounds present  MUSCULOSKELETAL:  No cyanosis, extremities grossly symmetrical  PSYCH: AAOx3, affect appropriate  NEUROLOGY: non-focal, cognition intact, MAEE    TEST RESULTS:              FINAL DISCHARGE INTERVIEW:  Resident(s) Present: (Name:____Maryjane_________), RN Present: (Name:  ___________)    DISCHARGE MEDICATION RECONCILIATION  reviewed with Attending (Name:___Luiz________)    DISPOSITION:   [  X ] Home,    [  ] Home with Visiting Nursing Services,   [    ]  SNF/ NH,    [   ] Acute Rehab (4A),   [   ] Other (Specify:_________)

## 2020-01-10 DIAGNOSIS — M41.9 SCOLIOSIS, UNSPECIFIED: ICD-10-CM

## 2020-01-10 DIAGNOSIS — E03.9 HYPOTHYROIDISM, UNSPECIFIED: ICD-10-CM

## 2020-01-10 DIAGNOSIS — E06.3 AUTOIMMUNE THYROIDITIS: ICD-10-CM

## 2020-01-10 DIAGNOSIS — I10 ESSENTIAL (PRIMARY) HYPERTENSION: ICD-10-CM

## 2020-01-10 DIAGNOSIS — I26.99 OTHER PULMONARY EMBOLISM WITHOUT ACUTE COR PULMONALE: ICD-10-CM

## 2020-01-10 DIAGNOSIS — Z87.891 PERSONAL HISTORY OF NICOTINE DEPENDENCE: ICD-10-CM

## 2020-01-10 DIAGNOSIS — R91.8 OTHER NONSPECIFIC ABNORMAL FINDING OF LUNG FIELD: ICD-10-CM

## 2020-01-10 DIAGNOSIS — R42 DIZZINESS AND GIDDINESS: ICD-10-CM

## 2020-01-23 PROBLEM — Z86.39 PERSONAL HISTORY OF OTHER ENDOCRINE, NUTRITIONAL AND METABOLIC DISEASE: Chronic | Status: ACTIVE | Noted: 2020-01-01

## 2020-01-23 PROBLEM — I10 ESSENTIAL (PRIMARY) HYPERTENSION: Chronic | Status: ACTIVE | Noted: 2020-01-01

## 2020-01-29 ENCOUNTER — OUTPATIENT (OUTPATIENT)
Dept: OUTPATIENT SERVICES | Facility: HOSPITAL | Age: 70
LOS: 1 days | Discharge: HOME | End: 2020-01-29
Payer: MEDICARE

## 2020-01-29 DIAGNOSIS — R91.1 SOLITARY PULMONARY NODULE: ICD-10-CM

## 2020-01-29 LAB — GLUCOSE BLDC GLUCOMTR-MCNC: 106 MG/DL — HIGH (ref 70–99)

## 2020-01-29 PROCEDURE — 78815 PET IMAGE W/CT SKULL-THIGH: CPT | Mod: 26,PI

## 2020-02-03 NOTE — ED ADULT NURSE NOTE - NSFALLRSKHARMRISK_ED_ALL_ED
Patient is requesting to speak with Dr. Vo regarding Left-sided, Lower Back Pain.  He stated it is a \"7 out of 10\" but not severe. He also stated he is having issue raising it, but he can walk.    Writer could not locate an appointment date/time to accommodate Patient with Dr. Vo and he declined scheduling with another provider.    Patient Name: Willem Jackson  Caller Name: Patient  Callback Number:   Try 1st: [phone] (M)  Try 2nd: [phone] (OTHER)  Can A Detailed Message Be left? yes  Did you confirm the message with the caller?: yes    Thank you,  Walter Nunes     no

## 2020-02-13 ENCOUNTER — APPOINTMENT (OUTPATIENT)
Dept: CARDIOLOGY | Facility: CLINIC | Age: 70
End: 2020-02-13
Payer: MEDICARE

## 2020-02-13 VITALS — DIASTOLIC BLOOD PRESSURE: 80 MMHG | SYSTOLIC BLOOD PRESSURE: 140 MMHG

## 2020-02-13 VITALS
HEART RATE: 56 BPM | WEIGHT: 205 LBS | HEIGHT: 65 IN | BODY MASS INDEX: 34.16 KG/M2 | DIASTOLIC BLOOD PRESSURE: 74 MMHG | SYSTOLIC BLOOD PRESSURE: 160 MMHG

## 2020-02-13 PROCEDURE — 93000 ELECTROCARDIOGRAM COMPLETE: CPT

## 2020-02-13 PROCEDURE — 99204 OFFICE O/P NEW MOD 45 MIN: CPT

## 2020-02-13 NOTE — ASSESSMENT
[FreeTextEntry1] : The patient had an unprovoked PE . Now on A./C . Had PET scan of the chest which sowed resolution of LLL finding . She has a cough from uncertain etiology .. She still has SOB which is most likely multifactorial. She has had a PE , she is overweight . She has a cough of uncertain etioogy .

## 2020-02-13 NOTE — HISTORY OF PRESENT ILLNESS
[FreeTextEntry1] : The patient has a history of having an acute PE . She  have pulmnary HTN and had some airspace disease . She has been on A/C since that time . This was no provked . She is to see hematology . She has not had heart issues . No MI No CHF , No CVA .

## 2020-02-13 NOTE — PHYSICAL EXAM
[General Appearance - Well Developed] : well developed [Normal Appearance] : normal appearance [Well Groomed] : well groomed [General Appearance - Well Nourished] : well nourished [No Deformities] : no deformities [General Appearance - In No Acute Distress] : no acute distress [Eyelids - No Xanthelasma] : the eyelids demonstrated no xanthelasmas [Normal Conjunctiva] : the conjunctiva exhibited no abnormalities [Normal Oral Mucosa] : normal oral mucosa [No Oral Pallor] : no oral pallor [No Oral Cyanosis] : no oral cyanosis [FreeTextEntry1] : No JVD  [Normal Rate] : normal [Rhythm Regular] : regular [2+] : left 2+ [Respiration, Rhythm And Depth] : normal respiratory rhythm and effort [Exaggerated Use Of Accessory Muscles For Inspiration] : no accessory muscle use [Auscultation Breath Sounds / Voice Sounds] : lungs were clear to auscultation bilaterally [Abdomen Soft] : soft [Abdomen Tenderness] : non-tender [Abnormal Walk] : normal gait [Abdomen Mass (___ Cm)] : no abdominal mass palpated [Gait - Sufficient For Exercise Testing] : the gait was sufficient for exercise testing [Skin Color & Pigmentation] : normal skin color and pigmentation [] : no rash [No Venous Stasis] : no venous stasis [No Skin Ulcers] : no skin ulcer [Skin Lesions] : no skin lesions [No Xanthoma] : no  xanthoma was observed [Affect] : the affect was normal [Oriented To Time, Place, And Person] : oriented to person, place, and time [Mood] : the mood was normal [No Anxiety] : not feeling anxious

## 2020-04-10 ENCOUNTER — APPOINTMENT (OUTPATIENT)
Dept: CARDIOLOGY | Facility: CLINIC | Age: 70
End: 2020-04-10

## 2020-05-29 ENCOUNTER — APPOINTMENT (OUTPATIENT)
Dept: HEMATOLOGY ONCOLOGY | Facility: CLINIC | Age: 70
End: 2020-05-29
Payer: MEDICARE

## 2020-05-29 ENCOUNTER — LABORATORY RESULT (OUTPATIENT)
Age: 70
End: 2020-05-29

## 2020-05-29 VITALS
HEART RATE: 91 BPM | WEIGHT: 204 LBS | RESPIRATION RATE: 16 BRPM | SYSTOLIC BLOOD PRESSURE: 148 MMHG | HEIGHT: 65 IN | DIASTOLIC BLOOD PRESSURE: 75 MMHG | BODY MASS INDEX: 33.99 KG/M2 | TEMPERATURE: 96.6 F

## 2020-05-29 DIAGNOSIS — H26.9 UNSPECIFIED CATARACT: ICD-10-CM

## 2020-05-29 LAB
ALBUMIN SERPL ELPH-MCNC: 4.2 G/DL
ALP BLD-CCNC: 83 U/L
ALT SERPL-CCNC: 25 U/L
ANION GAP SERPL CALC-SCNC: 11 MMOL/L
AST SERPL-CCNC: 25 U/L
BILIRUB SERPL-MCNC: 0.3 MG/DL
BUN SERPL-MCNC: 10 MG/DL
CALCIUM SERPL-MCNC: 9.3 MG/DL
CHLORIDE SERPL-SCNC: 107 MMOL/L
CO2 SERPL-SCNC: 25 MMOL/L
CREAT SERPL-MCNC: 0.8 MG/DL
GLUCOSE SERPL-MCNC: 110 MG/DL
HCT VFR BLD CALC: 38.8 %
HGB BLD-MCNC: 11.5 G/DL
MCHC RBC-ENTMCNC: 23.8 PG
MCHC RBC-ENTMCNC: 29.6 G/DL
MCV RBC AUTO: 80.3 FL
PLATELET # BLD AUTO: 348 K/UL
PMV BLD: 10.2 FL
POTASSIUM SERPL-SCNC: 3.9 MMOL/L
PROT SERPL-MCNC: 7 G/DL
RBC # BLD: 4.83 M/UL
RBC # FLD: 17.9 %
SODIUM SERPL-SCNC: 143 MMOL/L
WBC # FLD AUTO: 5.57 K/UL

## 2020-05-29 PROCEDURE — 99204 OFFICE O/P NEW MOD 45 MIN: CPT

## 2020-06-02 LAB
APTT HEX PL PPP: NEGATIVE
B2 GLYCOPROT1 IGA SERPL IA-ACNC: <5 SAU
B2 GLYCOPROT1 IGG SER-ACNC: <5 SGU
B2 GLYCOPROT1 IGM SER-ACNC: <5 SMU
HEX-1: 52.2 SECS
HEX-2: 52.2 SECS

## 2020-06-03 LAB
CONFIRM: NORMAL
DRVVT IMM 1:2 NP PPP: NORMAL
DRVVT SCREEN TO CONFIRM RATIO: 0.69 RATIO
JAK2 GENE MUT ANL BLD/T: NORMAL
SCREEN DRVVT: NORMAL
SILICA CLOTTING TIME INTERPRETATION: NORMAL
SILICA CLOTTING TIME S/C: 0.98 RATIO

## 2020-06-04 LAB
CARDIOLIPIN IGM SER-MCNC: 5 GPL
CARDIOLIPIN IGM SER-MCNC: <5 MPL
PS IGA SER QL: <20 U/ML
PS IGG SER QL: <10 U/ML
PS IGM SER QL: <25 U/ML

## 2020-06-08 LAB
DNA PLOIDY SPEC FC-IMP: NORMAL
PTR INTERP: NORMAL

## 2020-06-13 ENCOUNTER — APPOINTMENT (OUTPATIENT)
Dept: ENDOCRINOLOGY | Facility: CLINIC | Age: 70
End: 2020-06-13

## 2020-06-13 ENCOUNTER — APPOINTMENT (OUTPATIENT)
Dept: ENDOCRINOLOGY | Facility: CLINIC | Age: 70
End: 2020-06-13
Payer: MEDICARE

## 2020-06-13 PROCEDURE — G2012 BRIEF CHECK IN BY MD/QHP: CPT

## 2020-06-13 RX ORDER — CLOPIDOGREL 75 MG/1
75 TABLET, FILM COATED ORAL
Refills: 0 | Status: DISCONTINUED | COMMUNITY
End: 2020-06-13

## 2020-06-26 ENCOUNTER — OUTPATIENT (OUTPATIENT)
Dept: OUTPATIENT SERVICES | Facility: HOSPITAL | Age: 70
LOS: 1 days | Discharge: HOME | End: 2020-06-26

## 2020-06-26 ENCOUNTER — APPOINTMENT (OUTPATIENT)
Dept: HEMATOLOGY ONCOLOGY | Facility: CLINIC | Age: 70
End: 2020-06-26
Payer: MEDICARE

## 2020-06-26 VITALS
HEART RATE: 77 BPM | WEIGHT: 204 LBS | SYSTOLIC BLOOD PRESSURE: 160 MMHG | DIASTOLIC BLOOD PRESSURE: 77 MMHG | BODY MASS INDEX: 33.99 KG/M2 | RESPIRATION RATE: 16 BRPM | HEIGHT: 65 IN | TEMPERATURE: 97.9 F

## 2020-06-26 DIAGNOSIS — I27.82 CHRONIC PULMONARY EMBOLISM: ICD-10-CM

## 2020-06-26 DIAGNOSIS — Z79.01 LONG TERM (CURRENT) USE OF ANTICOAGULANTS: ICD-10-CM

## 2020-06-26 DIAGNOSIS — Z86.711 PERSONAL HISTORY OF PULMONARY EMBOLISM: ICD-10-CM

## 2020-06-26 LAB
INR PPP: 1.2 RATIO
POCT INR: 1.2 RATIO — SIGNIFICANT CHANGE UP (ref 0.9–1.2)
POCT PT: 14.1 SEC — HIGH (ref 10–13.4)
POCT-PROTHROMBIN TIME: 14.1 SECS
QUALITY CONTROL: YES

## 2020-06-26 PROCEDURE — 99213 OFFICE O/P EST LOW 20 MIN: CPT

## 2020-06-26 NOTE — ASSESSMENT
[FreeTextEntry1] : Pulmonary embolism, unprovoked, in an obese person. The excess weight may be a contributing factor. Despite almost 5 months of anticoagulation, she still seems to have residual sequelae (at least that's what I understood and that may be the reason why she was recommended staying on anticoagulation for a year).\par The patient is not up to date with her screenings. She was recommended proceeding with her mammogram and gynecological follow up. She was recommended also to have a colonoscopy which she seemed reluctant about and told to do at least the stool test (Cologard). \par For the moment, will try to rule out any underlying thrombophilia since we're dealing with an unprovoked pulmonary embolism especially in the presence of a family history of stroke.\par We will obtain CBC, CMP, Factor V Leiden, prothrombin gene mutation, antiphospholipid and anticardiolipin antibodies, JAK2. \par Further recommendations and follow up after those test results available.\par \par All her questions answered.

## 2020-06-26 NOTE — PHYSICAL EXAM
[Restricted in physically strenuous activity but ambulatory and able to carry out work of a light or sedentary nature] : Status 1- Restricted in physically strenuous activity but ambulatory and able to carry out work of a light or sedentary nature, e.g., light house work, office work [Obese] : obese [Normal] : affect appropriate [de-identified] : Eyeglasses noted [de-identified] : However, obesity may be limiting the examination details. [de-identified] : G [de-identified] : Grade I peripheral edema, apparently not new. [de-identified] : Some arthritic changes

## 2020-06-26 NOTE — ASSESSMENT
[FreeTextEntry1] : Thrombophilia manifested with pulmonary embolism. The patient should stay on indefinite anticoagulation for the time being.\par The work up has been positive for a lupus anticoagulant. \par \par Patient will switch Eliquis to Coumadin. She was instructed to continue Eliquis for about 5 days while starting Coumadin. INR then should be checked and if therapeutic the anticoagulation may be switched to Coumadin alone. She was referred to Coumadin clinic for starting the treatment and continual monitoring. \par The dietician was also consulted to further discuss dietary restrictions while on Coumadin. \par \par We will obtain duplex of lower extremity for left lower extremity edema. Previous duplex in the hospital, which was the only one done at the time of diagnosis, was negative. The patient has slightly more swelling of the LLE compared to the right. Will rule out chronic DVT. Once venous duplex is resulted, we will call patient with results.\par \par The patient is not up to date with her screenings. Again she was recommended proceeding with her mammogram and gynecological follow up. She was recommended also to have a colonoscopy which she seemed reluctant about and told to do at least the stool test (Cologuard). \par \par All her questions and concerns answered.\par \par Case was seen and discussed with Dr. Moody who agreed with the assessment and plan.\par

## 2020-06-26 NOTE — HISTORY OF PRESENT ILLNESS
[Disease:__________________________] : Disease: [unfilled] [de-identified] : The patient is a 70 year old white female of [place] extraction referred by Dr. Avila for evaluation of further management of pulmonary embolism.\par The story goes back to end of December of last year. The patient has been coughing for a long while thought to be from her medications. She was seen in a walk-in clinic for tiredness, weakness and may be somewhat worse cough and also shortness of breath for a few days. Pulse oximetry showing a saturation of only 88%, she was recommended to go to the ER where the diagnosis of pulmonary embolism was made. Apparently she had a large one in the right lung. She was admitted to the ICU. She was started on heparin for 24 to 48 hours then switched to Eliquis. Venous duplex was negative for a DVT.\par The patient was then discharged home with recommendations to follow up with us. She has been on the anticoagulation with the Eliquis 5 mg twice a day for almost 5 months now. \par She is denying any bleeding or bruising. She has not lost any weight. The patient has not had a mammogram or Pap smear in 2019. The patient is denying any blood in the stools or melena. No new cough (her cough is chronic and happens, it seems after eating or taking her medications). The patient does not have a gastroenterologist. However, she was told that she had reflux. She has not made any long travels and is very active physically.\par She had a PET scan earlier this year and it was negative. \par She was told that she still had the blood clots and that she needed to be on anticoagulation for at least a year.

## 2020-06-26 NOTE — REASON FOR VISIT
[Initial Consultation] : an initial consultation for [Spouse] : spouse [FreeTextEntry2] : Thromboembolism/ Antiphospholipid syndrome

## 2020-06-26 NOTE — HISTORY OF PRESENT ILLNESS
[Disease:__________________________] : Disease: [unfilled] [de-identified] : The patient is a 70 year old white female of [place] extraction returning today for follow up for pulmonary embolism and further instructions. \par She reports SOB especially with exertion and a chronic nonproductive cough. Patient denies chest pain, fever, bleeding, bruising, melena. She is currently on Eliquis 5 mg BID which she started in January 2020. \par \par Also, she is currently on Symbicort and will start prednisone today for asthmas exacerbation, as prescribed by her pulmonologist. The cough and SOB are improving.\par \par Most recent lab work reviewed with patient revealed the presence of a lupus anticoagulant.\par Mixing study showed mild, limited correction of the elevated PTT but back to the elevated baseline after 2 hours.\par \par

## 2020-06-26 NOTE — REASON FOR VISIT
[Initial Consultation] : an initial consultation for [Spouse] : spouse [FreeTextEntry2] : Thromboembolism

## 2020-06-26 NOTE — REVIEW OF SYSTEMS
[Night Sweats] : night sweats [Fatigue] : fatigue [Recent Change In Weight] : ~T recent weight change [Vision Problems] : vision problems [Lower Ext Edema] : lower extremity edema [Cough] : cough [Joint Pain] : joint pain [SOB on Exertion] : shortness of breath during exertion [Joint Stiffness] : joint stiffness [Hot Flashes] : hot flashes [Negative] : Heme/Lymph [FreeTextEntry2] : Gaining weight

## 2020-06-26 NOTE — CONSULT LETTER
[Consult Letter:] : I had the pleasure of evaluating your patient, [unfilled]. [Dear  ___] : Dear ~GARY, [Please see my note below.] : Please see my note below. [Sincerely,] : Sincerely, [Consult Closing:] : Thank you very much for allowing me to participate in the care of this patient.  If you have any questions, please do not hesitate to contact me. [FreeTextEntry3] : Dr. LAEK Moody [FreeTextEntry2] : Dr. Don Avila

## 2020-06-26 NOTE — REVIEW OF SYSTEMS
[Night Sweats] : night sweats [Fatigue] : fatigue [Recent Change In Weight] : ~T recent weight change [Vision Problems] : vision problems [Lower Ext Edema] : lower extremity edema [SOB on Exertion] : shortness of breath during exertion [Cough] : cough [Joint Pain] : joint pain [Hot Flashes] : hot flashes [Joint Stiffness] : joint stiffness [Negative] : Heme/Lymph [Shortness Of Breath] : shortness of breath [FreeTextEntry2] : gaining weight

## 2020-06-27 ENCOUNTER — RESULT REVIEW (OUTPATIENT)
Age: 70
End: 2020-06-27

## 2020-06-27 ENCOUNTER — OUTPATIENT (OUTPATIENT)
Dept: OUTPATIENT SERVICES | Facility: HOSPITAL | Age: 70
LOS: 1 days | Discharge: HOME | End: 2020-06-27
Payer: MEDICARE

## 2020-06-27 DIAGNOSIS — Z86.711 PERSONAL HISTORY OF PULMONARY EMBOLISM: ICD-10-CM

## 2020-06-27 PROCEDURE — 93970 EXTREMITY STUDY: CPT | Mod: 26

## 2020-06-29 ENCOUNTER — OUTPATIENT (OUTPATIENT)
Dept: OUTPATIENT SERVICES | Facility: HOSPITAL | Age: 70
LOS: 1 days | Discharge: HOME | End: 2020-06-29

## 2020-06-29 DIAGNOSIS — I27.82 CHRONIC PULMONARY EMBOLISM: ICD-10-CM

## 2020-06-29 DIAGNOSIS — Z79.01 LONG TERM (CURRENT) USE OF ANTICOAGULANTS: ICD-10-CM

## 2020-06-29 LAB
POCT INR: 1.9 RATIO — HIGH (ref 0.9–1.2)
POCT PT: 22.5 SEC — HIGH (ref 10–13.4)

## 2020-06-30 LAB
INR PPP: 1.9 RATIO
POCT-PROTHROMBIN TIME: 22.5 SECS
QUALITY CONTROL: YES

## 2020-07-01 ENCOUNTER — OUTPATIENT (OUTPATIENT)
Dept: OUTPATIENT SERVICES | Facility: HOSPITAL | Age: 70
LOS: 1 days | Discharge: HOME | End: 2020-07-01

## 2020-07-01 DIAGNOSIS — Z79.01 LONG TERM (CURRENT) USE OF ANTICOAGULANTS: ICD-10-CM

## 2020-07-01 DIAGNOSIS — D68.61 ANTIPHOSPHOLIPID SYNDROME: ICD-10-CM

## 2020-07-01 DIAGNOSIS — I27.82 CHRONIC PULMONARY EMBOLISM: ICD-10-CM

## 2020-07-01 LAB
POCT INR: 2.4 RATIO — HIGH (ref 0.9–1.2)
POCT PT: 28.5 SEC — HIGH (ref 10–13.4)

## 2020-07-06 ENCOUNTER — OUTPATIENT (OUTPATIENT)
Dept: OUTPATIENT SERVICES | Facility: HOSPITAL | Age: 70
LOS: 1 days | Discharge: HOME | End: 2020-07-06

## 2020-07-06 DIAGNOSIS — Z79.01 LONG TERM (CURRENT) USE OF ANTICOAGULANTS: ICD-10-CM

## 2020-07-06 DIAGNOSIS — I27.82 CHRONIC PULMONARY EMBOLISM: ICD-10-CM

## 2020-07-06 LAB
POCT INR: 1.4 RATIO — HIGH (ref 0.9–1.2)
POCT PT: 16.9 SEC — HIGH (ref 10–13.4)

## 2020-07-08 LAB
INR PPP: 1.4 RATIO
POCT-PROTHROMBIN TIME: 16.9 SECS
QUALITY CONTROL: YES

## 2020-07-09 ENCOUNTER — APPOINTMENT (OUTPATIENT)
Dept: CARDIOLOGY | Facility: CLINIC | Age: 70
End: 2020-07-09
Payer: MEDICARE

## 2020-07-09 ENCOUNTER — OUTPATIENT (OUTPATIENT)
Dept: OUTPATIENT SERVICES | Facility: HOSPITAL | Age: 70
LOS: 1 days | Discharge: HOME | End: 2020-07-09

## 2020-07-09 ENCOUNTER — RESULT CHARGE (OUTPATIENT)
Age: 70
End: 2020-07-09

## 2020-07-09 VITALS
HEIGHT: 65 IN | HEART RATE: 64 BPM | SYSTOLIC BLOOD PRESSURE: 140 MMHG | BODY MASS INDEX: 33.99 KG/M2 | TEMPERATURE: 97.5 F | DIASTOLIC BLOOD PRESSURE: 80 MMHG | WEIGHT: 204 LBS

## 2020-07-09 DIAGNOSIS — I27.82 CHRONIC PULMONARY EMBOLISM: ICD-10-CM

## 2020-07-09 DIAGNOSIS — Z79.01 LONG TERM (CURRENT) USE OF ANTICOAGULANTS: ICD-10-CM

## 2020-07-09 LAB
POCT INR: 2 RATIO — HIGH (ref 0.9–1.2)
POCT PT: 24.5 SEC — HIGH (ref 10–13.4)

## 2020-07-09 PROCEDURE — 99214 OFFICE O/P EST MOD 30 MIN: CPT

## 2020-07-09 PROCEDURE — 93000 ELECTROCARDIOGRAM COMPLETE: CPT

## 2020-07-09 NOTE — HISTORY OF PRESENT ILLNESS
[FreeTextEntry1] : The patient was found to have antiphospholipid syndrome . The patient has had no chest pain ..She has had increased SOB . She was placed on Prednisone and inhalers . She came off of Eliquis and was placed on Couamadin

## 2020-07-09 NOTE — PHYSICAL EXAM
[General Appearance - Well Developed] : well developed [Well Groomed] : well groomed [Normal Appearance] : normal appearance [General Appearance - In No Acute Distress] : no acute distress [No Deformities] : no deformities [General Appearance - Well Nourished] : well nourished [Eyelids - No Xanthelasma] : the eyelids demonstrated no xanthelasmas [Normal Conjunctiva] : the conjunctiva exhibited no abnormalities [Normal Oral Mucosa] : normal oral mucosa [No Oral Cyanosis] : no oral cyanosis [No Oral Pallor] : no oral pallor [Exaggerated Use Of Accessory Muscles For Inspiration] : no accessory muscle use [Respiration, Rhythm And Depth] : normal respiratory rhythm and effort [Auscultation Breath Sounds / Voice Sounds] : lungs were clear to auscultation bilaterally [Abdomen Tenderness] : non-tender [Abdomen Soft] : soft [Abdomen Mass (___ Cm)] : no abdominal mass palpated [Abnormal Walk] : normal gait [Gait - Sufficient For Exercise Testing] : the gait was sufficient for exercise testing [] : no rash [Skin Color & Pigmentation] : normal skin color and pigmentation [No Venous Stasis] : no venous stasis [Skin Lesions] : no skin lesions [No Skin Ulcers] : no skin ulcer [No Xanthoma] : no  xanthoma was observed [Oriented To Time, Place, And Person] : oriented to person, place, and time [Mood] : the mood was normal [No Anxiety] : not feeling anxious [Affect] : the affect was normal [Normal Rate] : normal [Rhythm Regular] : regular [2+] : right 2+ [FreeTextEntry1] : No JVD

## 2020-07-09 NOTE — ASSESSMENT
[FreeTextEntry1] : The patient has antiphospholipid syndrome . She had a PE . She has HICKS which has improved. She does fatigue easily .Coughing improved aftger ? changing off of Eliquis . Ischemia has been on hold pending her lung iissue improving . She does have pulmonary HTN as well. .

## 2020-07-14 ENCOUNTER — OUTPATIENT (OUTPATIENT)
Dept: OUTPATIENT SERVICES | Facility: HOSPITAL | Age: 70
LOS: 1 days | Discharge: HOME | End: 2020-07-14

## 2020-07-14 DIAGNOSIS — Z79.01 LONG TERM (CURRENT) USE OF ANTICOAGULANTS: ICD-10-CM

## 2020-07-14 DIAGNOSIS — I27.82 CHRONIC PULMONARY EMBOLISM: ICD-10-CM

## 2020-07-14 LAB
INR PPP: 2.2 RATIO
POCT INR: 2.2 RATIO — HIGH (ref 0.9–1.2)
POCT PT: 26.9 SEC — HIGH (ref 10–13.4)
POCT-PROTHROMBIN TIME: 26.9 SECS
QUALITY CONTROL: YES

## 2020-07-22 ENCOUNTER — OUTPATIENT (OUTPATIENT)
Dept: OUTPATIENT SERVICES | Facility: HOSPITAL | Age: 70
LOS: 1 days | Discharge: HOME | End: 2020-07-22

## 2020-07-22 DIAGNOSIS — I27.82 CHRONIC PULMONARY EMBOLISM: ICD-10-CM

## 2020-07-22 DIAGNOSIS — Z79.01 LONG TERM (CURRENT) USE OF ANTICOAGULANTS: ICD-10-CM

## 2020-07-22 LAB
INR PPP: 2.2 RATIO
POCT INR: 2.2 RATIO — HIGH (ref 0.9–1.2)
POCT PT: 26.7 SEC — HIGH (ref 10–13.4)
POCT-PROTHROMBIN TIME: 26.7 SECS
QUALITY CONTROL: YES

## 2020-07-30 ENCOUNTER — OUTPATIENT (OUTPATIENT)
Dept: OUTPATIENT SERVICES | Facility: HOSPITAL | Age: 70
LOS: 1 days | Discharge: HOME | End: 2020-07-30

## 2020-07-30 DIAGNOSIS — I27.82 CHRONIC PULMONARY EMBOLISM: ICD-10-CM

## 2020-07-30 DIAGNOSIS — Z79.01 LONG TERM (CURRENT) USE OF ANTICOAGULANTS: ICD-10-CM

## 2020-07-30 LAB
POCT INR: 1.9 RATIO — HIGH (ref 0.9–1.2)
POCT PT: 23 SEC — HIGH (ref 10–13.4)

## 2020-08-07 ENCOUNTER — OUTPATIENT (OUTPATIENT)
Dept: OUTPATIENT SERVICES | Facility: HOSPITAL | Age: 70
LOS: 1 days | Discharge: HOME | End: 2020-08-07

## 2020-08-07 DIAGNOSIS — Z79.01 LONG TERM (CURRENT) USE OF ANTICOAGULANTS: ICD-10-CM

## 2020-08-07 DIAGNOSIS — I27.82 CHRONIC PULMONARY EMBOLISM: ICD-10-CM

## 2020-08-07 LAB
POCT INR: 2.3 RATIO — HIGH (ref 0.9–1.2)
POCT PT: 28.2 SEC — HIGH (ref 10–13.4)

## 2020-08-14 ENCOUNTER — APPOINTMENT (OUTPATIENT)
Dept: MEDICATION MANAGEMENT | Facility: CLINIC | Age: 70
End: 2020-08-14

## 2020-08-14 ENCOUNTER — OUTPATIENT (OUTPATIENT)
Dept: OUTPATIENT SERVICES | Facility: HOSPITAL | Age: 70
LOS: 1 days | Discharge: HOME | End: 2020-08-14

## 2020-08-14 VITALS — OXYGEN SATURATION: 99 % | RESPIRATION RATE: 16 BRPM | HEART RATE: 64 BPM

## 2020-08-14 DIAGNOSIS — I27.82 CHRONIC PULMONARY EMBOLISM: ICD-10-CM

## 2020-08-14 DIAGNOSIS — Z79.01 LONG TERM (CURRENT) USE OF ANTICOAGULANTS: ICD-10-CM

## 2020-08-14 LAB
INR PPP: 2.6 RATIO
POCT-PROTHROMBIN TIME: 30.8 SECS
QUALITY CONTROL: YES

## 2020-08-19 ENCOUNTER — RECORD ABSTRACTING (OUTPATIENT)
Age: 70
End: 2020-08-19

## 2020-08-19 DIAGNOSIS — E78.00 PURE HYPERCHOLESTEROLEMIA, UNSPECIFIED: ICD-10-CM

## 2020-08-19 DIAGNOSIS — Z86.39 PERSONAL HISTORY OF OTHER ENDOCRINE, NUTRITIONAL AND METABOLIC DISEASE: ICD-10-CM

## 2020-08-19 RX ORDER — PNV NO.95/FERROUS FUM/FOLIC AC 28MG-0.8MG
TABLET ORAL
Refills: 0 | Status: ACTIVE | COMMUNITY

## 2020-08-28 ENCOUNTER — APPOINTMENT (OUTPATIENT)
Dept: MEDICATION MANAGEMENT | Facility: CLINIC | Age: 70
End: 2020-08-28

## 2020-08-28 ENCOUNTER — OUTPATIENT (OUTPATIENT)
Dept: OUTPATIENT SERVICES | Facility: HOSPITAL | Age: 70
LOS: 1 days | Discharge: HOME | End: 2020-08-28

## 2020-08-28 VITALS — OXYGEN SATURATION: 98 % | HEART RATE: 66 BPM | RESPIRATION RATE: 16 BRPM

## 2020-08-28 DIAGNOSIS — Z79.01 LONG TERM (CURRENT) USE OF ANTICOAGULANTS: ICD-10-CM

## 2020-08-28 DIAGNOSIS — I27.82 CHRONIC PULMONARY EMBOLISM: ICD-10-CM

## 2020-08-28 LAB
INR PPP: 2 RATIO
POCT-PROTHROMBIN TIME: 24.2 SECS
QUALITY CONTROL: YES

## 2020-09-01 ENCOUNTER — APPOINTMENT (OUTPATIENT)
Dept: CARDIOLOGY | Facility: CLINIC | Age: 70
End: 2020-09-01

## 2020-09-10 ENCOUNTER — OUTPATIENT (OUTPATIENT)
Dept: OUTPATIENT SERVICES | Facility: HOSPITAL | Age: 70
LOS: 1 days | Discharge: HOME | End: 2020-09-10

## 2020-09-10 ENCOUNTER — APPOINTMENT (OUTPATIENT)
Dept: MEDICATION MANAGEMENT | Facility: CLINIC | Age: 70
End: 2020-09-10

## 2020-09-10 VITALS — OXYGEN SATURATION: 99 % | RESPIRATION RATE: 16 BRPM | HEART RATE: 68 BPM

## 2020-09-10 DIAGNOSIS — Z79.01 LONG TERM (CURRENT) USE OF ANTICOAGULANTS: ICD-10-CM

## 2020-09-10 DIAGNOSIS — I27.82 CHRONIC PULMONARY EMBOLISM: ICD-10-CM

## 2020-09-10 LAB
INR PPP: 2 RATIO
POCT-PROTHROMBIN TIME: 24 SECS
QUALITY CONTROL: YES

## 2020-09-24 ENCOUNTER — OUTPATIENT (OUTPATIENT)
Dept: OUTPATIENT SERVICES | Facility: HOSPITAL | Age: 70
LOS: 1 days | Discharge: HOME | End: 2020-09-24

## 2020-09-24 ENCOUNTER — APPOINTMENT (OUTPATIENT)
Dept: MEDICATION MANAGEMENT | Facility: CLINIC | Age: 70
End: 2020-09-24

## 2020-09-24 VITALS — HEART RATE: 72 BPM | OXYGEN SATURATION: 98 % | RESPIRATION RATE: 14 BRPM

## 2020-09-24 LAB
INR PPP: 1.8 RATIO
POCT-PROTHROMBIN TIME: 21.1 SECS
QUALITY CONTROL: YES

## 2020-10-01 ENCOUNTER — OUTPATIENT (OUTPATIENT)
Dept: OUTPATIENT SERVICES | Facility: HOSPITAL | Age: 70
LOS: 1 days | Discharge: HOME | End: 2020-10-01
Payer: MEDICARE

## 2020-10-01 DIAGNOSIS — I26.99 OTHER PULMONARY EMBOLISM WITHOUT ACUTE COR PULMONALE: ICD-10-CM

## 2020-10-01 PROCEDURE — 78580 LUNG PERFUSION IMAGING: CPT | Mod: 26

## 2020-10-01 PROCEDURE — 71046 X-RAY EXAM CHEST 2 VIEWS: CPT | Mod: 26

## 2020-10-08 ENCOUNTER — APPOINTMENT (OUTPATIENT)
Dept: MEDICATION MANAGEMENT | Facility: CLINIC | Age: 70
End: 2020-10-08

## 2020-10-08 ENCOUNTER — OUTPATIENT (OUTPATIENT)
Dept: OUTPATIENT SERVICES | Facility: HOSPITAL | Age: 70
LOS: 1 days | Discharge: HOME | End: 2020-10-08

## 2020-10-08 VITALS — HEART RATE: 80 BPM | OXYGEN SATURATION: 97 % | RESPIRATION RATE: 16 BRPM

## 2020-10-08 DIAGNOSIS — Z79.01 LONG TERM (CURRENT) USE OF ANTICOAGULANTS: ICD-10-CM

## 2020-10-08 DIAGNOSIS — I27.82 CHRONIC PULMONARY EMBOLISM: ICD-10-CM

## 2020-10-08 LAB
INR PPP: 2.1 RATIO
POCT-PROTHROMBIN TIME: 25.3 SECS
QUALITY CONTROL: YES

## 2020-10-22 ENCOUNTER — APPOINTMENT (OUTPATIENT)
Dept: CARDIOLOGY | Facility: CLINIC | Age: 70
End: 2020-10-22
Payer: MEDICARE

## 2020-10-22 ENCOUNTER — APPOINTMENT (OUTPATIENT)
Dept: MEDICATION MANAGEMENT | Facility: CLINIC | Age: 70
End: 2020-10-22

## 2020-10-22 ENCOUNTER — OUTPATIENT (OUTPATIENT)
Dept: OUTPATIENT SERVICES | Facility: HOSPITAL | Age: 70
LOS: 1 days | Discharge: HOME | End: 2020-10-22

## 2020-10-22 VITALS
DIASTOLIC BLOOD PRESSURE: 80 MMHG | BODY MASS INDEX: 34.99 KG/M2 | HEIGHT: 65 IN | WEIGHT: 210 LBS | HEART RATE: 76 BPM | TEMPERATURE: 97.4 F | SYSTOLIC BLOOD PRESSURE: 140 MMHG

## 2020-10-22 VITALS — RESPIRATION RATE: 16 BRPM | OXYGEN SATURATION: 96 % | HEART RATE: 72 BPM

## 2020-10-22 DIAGNOSIS — Z79.01 LONG TERM (CURRENT) USE OF ANTICOAGULANTS: ICD-10-CM

## 2020-10-22 DIAGNOSIS — I27.82 CHRONIC PULMONARY EMBOLISM: ICD-10-CM

## 2020-10-22 LAB
INR PPP: 1.6 RATIO
POCT-PROTHROMBIN TIME: 19.8 SECS
QUALITY CONTROL: YES

## 2020-10-22 PROCEDURE — 99214 OFFICE O/P EST MOD 30 MIN: CPT

## 2020-10-22 PROCEDURE — 93000 ELECTROCARDIOGRAM COMPLETE: CPT

## 2020-10-22 NOTE — ASSESSMENT
[FreeTextEntry1] : The patient has antiphospholipid syndrome . The patient has had chest pain of uncertain etiology . Possible secondary to GERD vs lung issues . Can not R/O cardiac ischemia . She is unable to tell me if it occurs while she is walking . Repeat V/Q scan was negative for PE .

## 2020-10-22 NOTE — PHYSICAL EXAM
[General Appearance - Well Developed] : well developed [Normal Appearance] : normal appearance [Well Groomed] : well groomed [General Appearance - Well Nourished] : well nourished [No Deformities] : no deformities [General Appearance - In No Acute Distress] : no acute distress [Normal Conjunctiva] : the conjunctiva exhibited no abnormalities [Eyelids - No Xanthelasma] : the eyelids demonstrated no xanthelasmas [Normal Oral Mucosa] : normal oral mucosa [No Oral Pallor] : no oral pallor [No Oral Cyanosis] : no oral cyanosis [FreeTextEntry1] : No JVD  [Respiration, Rhythm And Depth] : normal respiratory rhythm and effort [Exaggerated Use Of Accessory Muscles For Inspiration] : no accessory muscle use [Auscultation Breath Sounds / Voice Sounds] : lungs were clear to auscultation bilaterally [Abdomen Soft] : soft [Abdomen Tenderness] : non-tender [Abdomen Mass (___ Cm)] : no abdominal mass palpated [Abnormal Walk] : normal gait [Gait - Sufficient For Exercise Testing] : the gait was sufficient for exercise testing [Skin Color & Pigmentation] : normal skin color and pigmentation [] : no rash [No Venous Stasis] : no venous stasis [Skin Lesions] : no skin lesions [No Skin Ulcers] : no skin ulcer [No Xanthoma] : no  xanthoma was observed [Oriented To Time, Place, And Person] : oriented to person, place, and time [Affect] : the affect was normal [Mood] : the mood was normal [No Anxiety] : not feeling anxious [Normal Rate] : normal [Rhythm Regular] : regular [2+] : left 2+

## 2020-10-22 NOTE — HISTORY OF PRESENT ILLNESS
[FreeTextEntry1] : The patient was found to have antiphospholipid syndrome . She is on Coumadin . The patiet has asthma and PE/VTE . Rpeat V/Q scan was negative for PE . The patient has not had an echo done . Britany has periods of chest pressure . Many times this is relieved with coughing and drinking water . Unable to tell me if it occurs n exertion

## 2020-11-05 ENCOUNTER — APPOINTMENT (OUTPATIENT)
Dept: MEDICATION MANAGEMENT | Facility: CLINIC | Age: 70
End: 2020-11-05

## 2020-11-05 ENCOUNTER — OUTPATIENT (OUTPATIENT)
Dept: OUTPATIENT SERVICES | Facility: HOSPITAL | Age: 70
LOS: 1 days | Discharge: HOME | End: 2020-11-05

## 2020-11-05 VITALS — RESPIRATION RATE: 16 BRPM | HEART RATE: 68 BPM | OXYGEN SATURATION: 97 %

## 2020-11-05 DIAGNOSIS — Z79.01 LONG TERM (CURRENT) USE OF ANTICOAGULANTS: ICD-10-CM

## 2020-11-05 DIAGNOSIS — I27.82 CHRONIC PULMONARY EMBOLISM: ICD-10-CM

## 2020-11-05 LAB
INR PPP: 2.5 RATIO
POCT-PROTHROMBIN TIME: 30.1 SECS
QUALITY CONTROL: YES

## 2020-11-19 ENCOUNTER — OUTPATIENT (OUTPATIENT)
Dept: OUTPATIENT SERVICES | Facility: HOSPITAL | Age: 70
LOS: 1 days | Discharge: HOME | End: 2020-11-19

## 2020-11-19 ENCOUNTER — APPOINTMENT (OUTPATIENT)
Dept: MEDICATION MANAGEMENT | Facility: CLINIC | Age: 70
End: 2020-11-19

## 2020-11-19 VITALS — HEART RATE: 72 BPM | OXYGEN SATURATION: 99 % | RESPIRATION RATE: 16 BRPM

## 2020-11-19 DIAGNOSIS — Z79.01 LONG TERM (CURRENT) USE OF ANTICOAGULANTS: ICD-10-CM

## 2020-11-19 DIAGNOSIS — I27.82 CHRONIC PULMONARY EMBOLISM: ICD-10-CM

## 2020-11-19 LAB
INR PPP: 2.8 RATIO
POCT-PROTHROMBIN TIME: 34.6 SECS
QUALITY CONTROL: YES

## 2020-11-24 ENCOUNTER — APPOINTMENT (OUTPATIENT)
Dept: CARDIOLOGY | Facility: CLINIC | Age: 70
End: 2020-11-24

## 2020-11-25 DIAGNOSIS — Z02.9 ENCOUNTER FOR ADMINISTRATIVE EXAMINATIONS, UNSPECIFIED: ICD-10-CM

## 2020-12-12 ENCOUNTER — APPOINTMENT (OUTPATIENT)
Dept: ENDOCRINOLOGY | Facility: CLINIC | Age: 70
End: 2020-12-12
Payer: MEDICARE

## 2020-12-12 VITALS
OXYGEN SATURATION: 96 % | TEMPERATURE: 97.8 F | BODY MASS INDEX: 35.83 KG/M2 | WEIGHT: 215.06 LBS | HEART RATE: 95 BPM | HEIGHT: 65 IN | SYSTOLIC BLOOD PRESSURE: 138 MMHG | DIASTOLIC BLOOD PRESSURE: 80 MMHG

## 2020-12-12 PROCEDURE — 99214 OFFICE O/P EST MOD 30 MIN: CPT

## 2020-12-12 NOTE — ASSESSMENT
[FreeTextEntry1] : Pt. has multinodular goiter. We have reviewed ultrasounds (6/6/2020)  and the discussed the potential for hyperfunction and need to monitor for changes that could be consistent with neoplasm. Will continue to monitor U/S for stability and function.\par The patient has a history of hypothyroidism though currently is clinically euthyroid with no hypothyroid or hyperthyroid signs or symptoms. The patient's TSH,  free T4 and free T3 are in the normal range.Risks of subclinical hyperthyroidism (BMD,cardio etc.) and hypothyroidism ( fatigue , muscle aches, weight gain etc.)  discussed in detail and given clinical euthyroid state after discussion pt. is comfortable with current dose.\par Pt. has had a low Vit D which is now 23.8--> 28.3. .  The importance of normal value and need for Vit D supplements of 1000 IU daily was discussed.\par Lipid levels were reviewed with patient and importance and function of LDL, HDL and triglycerides discussed. Methods to increase HDL (exercise, fish, beans, oat meal, legumes etc.) discussed with pt. in conjunction with measures to decrease LDL and triglycerides  including diet and exercise.LDL/HDL was 132/72 . Current regimen of treatment to continue. Risks of statins were discussed in detail. \par Pt. has Impaired fasting glucose ( 116) and  current HbA1c is 5.7\par We have discussed diet/exercise and risks related to diabetes in great detail.\par \par

## 2020-12-14 ENCOUNTER — OUTPATIENT (OUTPATIENT)
Dept: OUTPATIENT SERVICES | Facility: HOSPITAL | Age: 70
LOS: 1 days | Discharge: HOME | End: 2020-12-14

## 2020-12-14 ENCOUNTER — APPOINTMENT (OUTPATIENT)
Dept: MEDICATION MANAGEMENT | Facility: CLINIC | Age: 70
End: 2020-12-14

## 2020-12-14 VITALS — RESPIRATION RATE: 16 BRPM | OXYGEN SATURATION: 96 % | HEART RATE: 95 BPM

## 2020-12-14 DIAGNOSIS — Z02.9 ENCOUNTER FOR ADMINISTRATIVE EXAMINATIONS, UNSPECIFIED: ICD-10-CM

## 2020-12-14 DIAGNOSIS — Z79.01 LONG TERM (CURRENT) USE OF ANTICOAGULANTS: ICD-10-CM

## 2020-12-14 LAB
INR PPP: 2.8 RATIO
POCT-PROTHROMBIN TIME: 33.5 SECS
QUALITY CONTROL: YES

## 2021-01-22 ENCOUNTER — APPOINTMENT (OUTPATIENT)
Dept: MEDICATION MANAGEMENT | Facility: CLINIC | Age: 71
End: 2021-01-22

## 2021-01-22 ENCOUNTER — OUTPATIENT (OUTPATIENT)
Dept: OUTPATIENT SERVICES | Facility: HOSPITAL | Age: 71
LOS: 1 days | Discharge: HOME | End: 2021-01-22

## 2021-01-22 DIAGNOSIS — Z79.01 LONG TERM (CURRENT) USE OF ANTICOAGULANTS: ICD-10-CM

## 2021-01-22 DIAGNOSIS — Z02.9 ENCOUNTER FOR ADMINISTRATIVE EXAMINATIONS, UNSPECIFIED: ICD-10-CM

## 2021-01-22 LAB
INR PPP: 1.7 RATIO
POCT-PROTHROMBIN TIME: 20 SECS
QUALITY CONTROL: YES

## 2021-02-08 ENCOUNTER — APPOINTMENT (OUTPATIENT)
Dept: CARDIOLOGY | Facility: CLINIC | Age: 71
End: 2021-02-08

## 2021-02-25 ENCOUNTER — OUTPATIENT (OUTPATIENT)
Dept: OUTPATIENT SERVICES | Facility: HOSPITAL | Age: 71
LOS: 1 days | Discharge: HOME | End: 2021-02-25

## 2021-02-25 ENCOUNTER — APPOINTMENT (OUTPATIENT)
Dept: MEDICATION MANAGEMENT | Facility: CLINIC | Age: 71
End: 2021-02-25

## 2021-02-25 VITALS — HEART RATE: 96 BPM | OXYGEN SATURATION: 97 % | RESPIRATION RATE: 16 BRPM

## 2021-02-25 DIAGNOSIS — I27.82 CHRONIC PULMONARY EMBOLISM: ICD-10-CM

## 2021-02-25 DIAGNOSIS — Z79.01 LONG TERM (CURRENT) USE OF ANTICOAGULANTS: ICD-10-CM

## 2021-02-25 LAB
INR PPP: 3 RATIO
POCT-PROTHROMBIN TIME: 35.7 SECS
QUALITY CONTROL: YES

## 2021-03-09 ENCOUNTER — APPOINTMENT (OUTPATIENT)
Dept: CARDIOLOGY | Facility: CLINIC | Age: 71
End: 2021-03-09
Payer: MEDICARE

## 2021-03-09 DIAGNOSIS — R07.9 CHEST PAIN, UNSPECIFIED: ICD-10-CM

## 2021-03-09 PROCEDURE — 93306 TTE W/DOPPLER COMPLETE: CPT

## 2021-03-11 ENCOUNTER — APPOINTMENT (OUTPATIENT)
Dept: CARDIOLOGY | Facility: CLINIC | Age: 71
End: 2021-03-11
Payer: MEDICARE

## 2021-03-11 VITALS
HEART RATE: 74 BPM | DIASTOLIC BLOOD PRESSURE: 86 MMHG | WEIGHT: 214 LBS | TEMPERATURE: 97.3 F | HEIGHT: 65 IN | BODY MASS INDEX: 35.65 KG/M2 | SYSTOLIC BLOOD PRESSURE: 140 MMHG

## 2021-03-11 PROBLEM — R07.9 CHEST PAIN: Status: ACTIVE | Noted: 2020-10-22

## 2021-03-11 PROCEDURE — 99214 OFFICE O/P EST MOD 30 MIN: CPT

## 2021-03-11 PROCEDURE — 93000 ELECTROCARDIOGRAM COMPLETE: CPT

## 2021-03-11 NOTE — ASSESSMENT
[FreeTextEntry1] : The patient has had continued SOB and her cough has improved after starting Symbicort. The patient's pulmonary HTN has improved. The patient has not had her stress test done as of yet . The patient has had no chest pain . She has ild wheezing on exam  which may makepharmacolgic stress testng more difficult

## 2021-03-11 NOTE — PHYSICAL EXAM
[General Appearance - Well Developed] : well developed [Normal Appearance] : normal appearance [Well Groomed] : well groomed [General Appearance - Well Nourished] : well nourished [No Deformities] : no deformities [General Appearance - In No Acute Distress] : no acute distress [Normal Conjunctiva] : the conjunctiva exhibited no abnormalities [Eyelids - No Xanthelasma] : the eyelids demonstrated no xanthelasmas [Normal Oral Mucosa] : normal oral mucosa [No Oral Pallor] : no oral pallor [No Oral Cyanosis] : no oral cyanosis [Respiration, Rhythm And Depth] : normal respiratory rhythm and effort [Exaggerated Use Of Accessory Muscles For Inspiration] : no accessory muscle use [Auscultation Breath Sounds / Voice Sounds] : lungs were clear to auscultation bilaterally [Abdomen Soft] : soft [Abdomen Tenderness] : non-tender [Abdomen Mass (___ Cm)] : no abdominal mass palpated [Abnormal Walk] : normal gait [Gait - Sufficient For Exercise Testing] : the gait was sufficient for exercise testing [Skin Color & Pigmentation] : normal skin color and pigmentation [] : no rash [No Venous Stasis] : no venous stasis [Skin Lesions] : no skin lesions [No Skin Ulcers] : no skin ulcer [No Xanthoma] : no  xanthoma was observed [Oriented To Time, Place, And Person] : oriented to person, place, and time [Affect] : the affect was normal [Mood] : the mood was normal [No Anxiety] : not feeling anxious [Normal Rate] : normal [Rhythm Regular] : regular [2+] : left 2+ [FreeTextEntry1] : No JVD

## 2021-03-11 NOTE — HISTORY OF PRESENT ILLNESS
[FreeTextEntry1] : The patient has continued HICKS  She has had PE and known asthma. Echocardiogram showed normal LV systolic function . There is an intracavitary  in the mid and apex dynamic obstruction but not severe . She remains on high dos Diltiazem which is helping this . She has not had nuclear stress test done as of yet

## 2021-04-01 ENCOUNTER — OUTPATIENT (OUTPATIENT)
Dept: OUTPATIENT SERVICES | Facility: HOSPITAL | Age: 71
LOS: 1 days | Discharge: HOME | End: 2021-04-01

## 2021-04-01 ENCOUNTER — APPOINTMENT (OUTPATIENT)
Dept: MEDICATION MANAGEMENT | Facility: CLINIC | Age: 71
End: 2021-04-01

## 2021-04-01 VITALS — OXYGEN SATURATION: 95 % | HEART RATE: 64 BPM | RESPIRATION RATE: 16 BRPM

## 2021-04-01 DIAGNOSIS — Z79.01 LONG TERM (CURRENT) USE OF ANTICOAGULANTS: ICD-10-CM

## 2021-04-01 DIAGNOSIS — I27.82 CHRONIC PULMONARY EMBOLISM: ICD-10-CM

## 2021-04-01 LAB
INR PPP: 3.6 RATIO
POCT-PROTHROMBIN TIME: 43.8 SECS
QUALITY CONTROL: YES

## 2021-04-14 ENCOUNTER — APPOINTMENT (OUTPATIENT)
Dept: MEDICATION MANAGEMENT | Facility: CLINIC | Age: 71
End: 2021-04-14

## 2021-04-14 ENCOUNTER — OUTPATIENT (OUTPATIENT)
Dept: OUTPATIENT SERVICES | Facility: HOSPITAL | Age: 71
LOS: 1 days | Discharge: HOME | End: 2021-04-14

## 2021-04-14 VITALS — RESPIRATION RATE: 16 BRPM | OXYGEN SATURATION: 96 % | HEART RATE: 66 BPM

## 2021-04-14 DIAGNOSIS — Z79.01 LONG TERM (CURRENT) USE OF ANTICOAGULANTS: ICD-10-CM

## 2021-04-14 DIAGNOSIS — I27.82 CHRONIC PULMONARY EMBOLISM: ICD-10-CM

## 2021-04-14 LAB
INR PPP: 3.6 RATIO
POCT-PROTHROMBIN TIME: 43 SECS
QUALITY CONTROL: YES

## 2021-04-30 ENCOUNTER — APPOINTMENT (OUTPATIENT)
Dept: MEDICATION MANAGEMENT | Facility: CLINIC | Age: 71
End: 2021-04-30

## 2021-04-30 ENCOUNTER — OUTPATIENT (OUTPATIENT)
Dept: OUTPATIENT SERVICES | Facility: HOSPITAL | Age: 71
LOS: 1 days | Discharge: HOME | End: 2021-04-30

## 2021-04-30 VITALS — HEART RATE: 83 BPM | OXYGEN SATURATION: 96 % | RESPIRATION RATE: 16 BRPM

## 2021-04-30 DIAGNOSIS — Z79.01 LONG TERM (CURRENT) USE OF ANTICOAGULANTS: ICD-10-CM

## 2021-04-30 DIAGNOSIS — I27.82 CHRONIC PULMONARY EMBOLISM: ICD-10-CM

## 2021-04-30 LAB
INR PPP: 2.5 RATIO
POCT-PROTHROMBIN TIME: 30.6 SECS
QUALITY CONTROL: YES

## 2021-05-06 ENCOUNTER — OUTPATIENT (OUTPATIENT)
Dept: OUTPATIENT SERVICES | Facility: HOSPITAL | Age: 71
LOS: 1 days | Discharge: HOME | End: 2021-05-06
Payer: MEDICARE

## 2021-05-06 DIAGNOSIS — G31.84 MILD COGNITIVE IMPAIRMENT OF UNCERTAIN OR UNKNOWN ETIOLOGY: ICD-10-CM

## 2021-05-06 PROCEDURE — 70551 MRI BRAIN STEM W/O DYE: CPT | Mod: 26,MH

## 2021-05-11 ENCOUNTER — OUTPATIENT (OUTPATIENT)
Dept: OUTPATIENT SERVICES | Facility: HOSPITAL | Age: 71
LOS: 1 days | Discharge: HOME | End: 2021-05-11

## 2021-05-11 ENCOUNTER — APPOINTMENT (OUTPATIENT)
Dept: MEDICATION MANAGEMENT | Facility: CLINIC | Age: 71
End: 2021-05-11

## 2021-05-11 VITALS — RESPIRATION RATE: 16 BRPM

## 2021-05-11 DIAGNOSIS — I27.82 CHRONIC PULMONARY EMBOLISM: ICD-10-CM

## 2021-05-11 DIAGNOSIS — Z79.01 LONG TERM (CURRENT) USE OF ANTICOAGULANTS: ICD-10-CM

## 2021-05-11 LAB
INR PPP: 3.1 RATIO
POCT-PROTHROMBIN TIME: 36.6 SECS
QUALITY CONTROL: YES

## 2021-05-17 ENCOUNTER — OUTPATIENT (OUTPATIENT)
Dept: OUTPATIENT SERVICES | Facility: HOSPITAL | Age: 71
LOS: 1 days | Discharge: HOME | End: 2021-05-17

## 2021-05-17 ENCOUNTER — LABORATORY RESULT (OUTPATIENT)
Age: 71
End: 2021-05-17

## 2021-05-17 ENCOUNTER — APPOINTMENT (OUTPATIENT)
Dept: HEMATOLOGY ONCOLOGY | Facility: CLINIC | Age: 71
End: 2021-05-17
Payer: MEDICARE

## 2021-05-17 ENCOUNTER — APPOINTMENT (OUTPATIENT)
Dept: MEDICATION MANAGEMENT | Facility: CLINIC | Age: 71
End: 2021-05-17

## 2021-05-17 VITALS
HEIGHT: 65 IN | SYSTOLIC BLOOD PRESSURE: 145 MMHG | BODY MASS INDEX: 35.49 KG/M2 | HEART RATE: 81 BPM | WEIGHT: 213 LBS | RESPIRATION RATE: 14 BRPM | TEMPERATURE: 97.6 F | DIASTOLIC BLOOD PRESSURE: 85 MMHG

## 2021-05-17 VITALS — RESPIRATION RATE: 16 BRPM | OXYGEN SATURATION: 96 % | HEART RATE: 85 BPM

## 2021-05-17 DIAGNOSIS — Z79.01 LONG TERM (CURRENT) USE OF ANTICOAGULANTS: ICD-10-CM

## 2021-05-17 DIAGNOSIS — I27.82 CHRONIC PULMONARY EMBOLISM: ICD-10-CM

## 2021-05-17 LAB
HCT VFR BLD CALC: 41 %
HGB BLD-MCNC: 12.9 G/DL
INR PPP: 2.1 RATIO
MCHC RBC-ENTMCNC: 26.9 PG
MCHC RBC-ENTMCNC: 31.5 G/DL
MCV RBC AUTO: 85.4 FL
PLATELET # BLD AUTO: 311 K/UL
PMV BLD: 10.3 FL
POCT-PROTHROMBIN TIME: 25.7 SECS
QUALITY CONTROL: YES
RBC # BLD: 4.8 M/UL
RBC # FLD: 15.8 %
WBC # FLD AUTO: 7.49 K/UL

## 2021-05-17 PROCEDURE — 99214 OFFICE O/P EST MOD 30 MIN: CPT

## 2021-05-18 LAB
ALBUMIN SERPL ELPH-MCNC: 4.3 G/DL
ALP BLD-CCNC: 90 U/L
ALT SERPL-CCNC: 18 U/L
ANION GAP SERPL CALC-SCNC: 13 MMOL/L
APTT 2H P 1:4 NP PPP: 35.9 SEC
APTT 2H P INC PPP: 36.8 SEC
APTT 50/50 MIX COMMENT: NORMAL
APTT IMM NP/PRE NP PPP: 37.1 SEC
APTT INV RATIO PPP: 49.3 SEC
AST SERPL-CCNC: 20 U/L
BILIRUB SERPL-MCNC: 0.3 MG/DL
BUN SERPL-MCNC: 13 MG/DL
CALCIUM SERPL-MCNC: 9.3 MG/DL
CHLORIDE SERPL-SCNC: 103 MMOL/L
CO2 SERPL-SCNC: 26 MMOL/L
CREAT SERPL-MCNC: 0.8 MG/DL
GLUCOSE SERPL-MCNC: 82 MG/DL
NPP NORMAL POOLED PLASMA: 33.7 SECS
POTASSIUM SERPL-SCNC: 3.9 MMOL/L
PROT SERPL-MCNC: 6.8 G/DL
SODIUM SERPL-SCNC: 142 MMOL/L

## 2021-05-18 NOTE — HISTORY OF PRESENT ILLNESS
[Disease:__________________________] : Disease: [unfilled] [de-identified] : The patient is a 70 year old white female of [place] extraction returning today for follow up for pulmonary embolism and further instructions. \par She reports SOB especially with exertion and a chronic nonproductive cough. Patient denies chest pain, fever, bleeding, bruising, melena. She is currently on Eliquis 5 mg BID which she started in January 2020. \par \par Also, she is currently on Symbicort and will start prednisone today for asthmas exacerbation, as prescribed by her pulmonologist. The cough and SOB are improving.\par \par Most recent lab work reviewed with patient revealed the presence of a lupus anticoagulant.\par Mixing study showed mild, limited correction of the elevated PTT but back to the elevated baseline after 2 hours.\par \par  [de-identified] : 5/17/21\par Patient here for follow up visit, for antiphospholipid syndrome, and history of PE.  \par She is currently on Coumadin, monitored at the Coumadin clinic. She is scheduled for tooth extraction soon.  \par She is complaining of persistent fatigue and SOB.  She also is being followed by her vascular surgeon, for leg claudication symptoms.  She denies any bleeding or bruising.

## 2021-05-18 NOTE — REVIEW OF SYSTEMS
[Fatigue] : fatigue [Vision Problems] : vision problems [Lower Ext Edema] : lower extremity edema [Shortness Of Breath] : shortness of breath [Cough] : cough [SOB on Exertion] : shortness of breath during exertion [Joint Pain] : joint pain [Joint Stiffness] : joint stiffness [Leg Claudication] : intermittent leg claudication [Negative] : Endocrine [Night Sweats] : no night sweats [Recent Change In Weight] : ~T no recent weight change [Hot Flashes] : no hot flashes [FreeTextEntry2] : tired, weak

## 2021-05-18 NOTE — PHYSICAL EXAM
[Restricted in physically strenuous activity but ambulatory and able to carry out work of a light or sedentary nature] : Status 1- Restricted in physically strenuous activity but ambulatory and able to carry out work of a light or sedentary nature, e.g., light house work, office work [Obese] : obese [Normal] : affect appropriate [de-identified] : Eyeglasses noted [de-identified] : Grade I peripheral edema, apparently not new. [de-identified] : However, obesity may be limiting the examination details. [de-identified] : Some arthritic changes

## 2021-05-18 NOTE — CONSULT LETTER
[Dear  ___] : Dear ~GARY, [Please see my note below.] : Please see my note below. [Consult Closing:] : Thank you very much for allowing me to participate in the care of this patient.  If you have any questions, please do not hesitate to contact me. [Sincerely,] : Sincerely, [Courtesy Letter:] : I had the pleasure of seeing your patient, [unfilled], in my office today. [FreeTextEntry2] : Dr. Don Avila [FreeTextEntry3] : Dr. ALEK Moody

## 2021-05-18 NOTE — ASSESSMENT
[FreeTextEntry1] : Thrombophilia manifested with pulmonary embolism.\par The patient should stay on indefinite anticoagulation given the presence of her lupus anticoagulant.\par The work up has been positive for a lupus anticoagulant. \par \par Patient will continue with Coumadin, and see them at Coumadin clinic for continual monitoring. \par \par The patient is not up to date with her screenings. Again she was recommended proceeding with her mammogram and gynecological follow up. She was recommended also to have a colonoscopy.\par \par Will repeat labs today, CBC, CMP, antiphospholipid and anticardiolipin antibodies. INR and PTT done at the Coumadin clinic.\par \par All her questions and concerns answered.\par \par Case was seen and discussed with Dr. Moody who agreed with the assessment and plan.\par

## 2021-05-19 LAB
B2 GLYCOPROT1 IGA SERPL IA-ACNC: <5 SAU
B2 GLYCOPROT1 IGG SER-ACNC: <5 SGU
B2 GLYCOPROT1 IGM SER-ACNC: <5 SMU
CARDIOLIPIN AB SER IA-ACNC: NEGATIVE
CARDIOLIPIN IGM SER-MCNC: 6.5 MPL
CARDIOLIPIN IGM SER-MCNC: <5 GPL

## 2021-05-20 ENCOUNTER — OUTPATIENT (OUTPATIENT)
Dept: OUTPATIENT SERVICES | Facility: HOSPITAL | Age: 71
LOS: 1 days | Discharge: HOME | End: 2021-05-20
Payer: MEDICARE

## 2021-05-20 ENCOUNTER — RESULT REVIEW (OUTPATIENT)
Age: 71
End: 2021-05-20

## 2021-05-20 DIAGNOSIS — R07.89 OTHER CHEST PAIN: ICD-10-CM

## 2021-05-20 DIAGNOSIS — I27.82 CHRONIC PULMONARY EMBOLISM: ICD-10-CM

## 2021-05-20 DIAGNOSIS — I10 ESSENTIAL (PRIMARY) HYPERTENSION: ICD-10-CM

## 2021-05-20 DIAGNOSIS — E78.00 PURE HYPERCHOLESTEROLEMIA, UNSPECIFIED: ICD-10-CM

## 2021-05-20 LAB
PS IGA SER QL: 1
PS IGG SER QL: 4
PS IGM SER QL: 3

## 2021-05-20 PROCEDURE — 75574 CT ANGIO HRT W/3D IMAGE: CPT | Mod: 26,MH

## 2021-05-21 DIAGNOSIS — I27.82 CHRONIC PULMONARY EMBOLISM: ICD-10-CM

## 2021-05-21 DIAGNOSIS — Z79.01 LONG TERM (CURRENT) USE OF ANTICOAGULANTS: ICD-10-CM

## 2021-05-21 DIAGNOSIS — D68.61 ANTIPHOSPHOLIPID SYNDROME: ICD-10-CM

## 2021-05-25 ENCOUNTER — APPOINTMENT (OUTPATIENT)
Dept: MEDICATION MANAGEMENT | Facility: CLINIC | Age: 71
End: 2021-05-25

## 2021-05-25 ENCOUNTER — APPOINTMENT (OUTPATIENT)
Dept: VASCULAR SURGERY | Facility: CLINIC | Age: 71
End: 2021-05-25
Payer: MEDICARE

## 2021-05-25 ENCOUNTER — OUTPATIENT (OUTPATIENT)
Dept: OUTPATIENT SERVICES | Facility: HOSPITAL | Age: 71
LOS: 1 days | Discharge: HOME | End: 2021-05-25

## 2021-05-25 VITALS — HEART RATE: 75 BPM | RESPIRATION RATE: 14 BRPM | OXYGEN SATURATION: 97 %

## 2021-05-25 VITALS
DIASTOLIC BLOOD PRESSURE: 74 MMHG | WEIGHT: 215 LBS | HEART RATE: 82 BPM | HEIGHT: 65 IN | SYSTOLIC BLOOD PRESSURE: 155 MMHG | BODY MASS INDEX: 35.82 KG/M2

## 2021-05-25 DIAGNOSIS — I27.82 CHRONIC PULMONARY EMBOLISM: ICD-10-CM

## 2021-05-25 DIAGNOSIS — Z79.01 LONG TERM (CURRENT) USE OF ANTICOAGULANTS: ICD-10-CM

## 2021-05-25 LAB
INR PPP: 2.6 RATIO
POCT-PROTHROMBIN TIME: 31.8 SECS
QUALITY CONTROL: YES

## 2021-05-25 PROCEDURE — 93970 EXTREMITY STUDY: CPT

## 2021-05-25 PROCEDURE — 99203 OFFICE O/P NEW LOW 30 MIN: CPT

## 2021-05-25 NOTE — HISTORY OF PRESENT ILLNESS
[FreeTextEntry1] : 70 y/o female who was diagnosed with pulmonary embolism in January 2020, was on Eliquis until June 2020, when she was seen by Hematology and workup revealed antiphospholipid syndrome and is now on Coumadin. She c/o generalized fatigue, shortness of breath, leg heaviness, had a venous duplex with PMD that showed GSV reflux and was sent in for evaluation.

## 2021-05-25 NOTE — ASSESSMENT
[FreeTextEntry1] : 72 y/o female with varicose veins with GSV reflux on duplex bilaterally. She has symptoms of exertional dyspnea and is still being worked up for that.\par \par I have explained to her that no urgent vascular surgical intervention is indicated at this time. I will see her back in six months time and if her symptoms of dyspnea and fatigue are resolved, I will offer EVLT for vein closure.

## 2021-05-25 NOTE — DATA REVIEWED
[FreeTextEntry1] : I performed a venous duplex which was medically necessary to evaluate for GSV reflux. It showed no evidence of DVT or superficial phlebitis in the lower extremities bilaterally, GSV was incompetent and positive for reflux bilaterally.\par

## 2021-06-01 ENCOUNTER — APPOINTMENT (OUTPATIENT)
Dept: MEDICATION MANAGEMENT | Facility: CLINIC | Age: 71
End: 2021-06-01

## 2021-06-01 ENCOUNTER — OUTPATIENT (OUTPATIENT)
Dept: OUTPATIENT SERVICES | Facility: HOSPITAL | Age: 71
LOS: 1 days | Discharge: HOME | End: 2021-06-01

## 2021-06-01 ENCOUNTER — APPOINTMENT (OUTPATIENT)
Dept: HEMATOLOGY ONCOLOGY | Facility: CLINIC | Age: 71
End: 2021-06-01

## 2021-06-01 VITALS — OXYGEN SATURATION: 96 % | HEART RATE: 84 BPM | RESPIRATION RATE: 16 BRPM

## 2021-06-01 DIAGNOSIS — Z79.01 LONG TERM (CURRENT) USE OF ANTICOAGULANTS: ICD-10-CM

## 2021-06-01 DIAGNOSIS — I27.82 CHRONIC PULMONARY EMBOLISM: ICD-10-CM

## 2021-06-01 LAB
INR PPP: 2.2 RATIO
POCT-PROTHROMBIN TIME: 25.8 SECS
QUALITY CONTROL: YES

## 2021-06-05 ENCOUNTER — APPOINTMENT (OUTPATIENT)
Dept: ENDOCRINOLOGY | Facility: CLINIC | Age: 71
End: 2021-06-05
Payer: MEDICARE

## 2021-06-05 VITALS
DIASTOLIC BLOOD PRESSURE: 81 MMHG | OXYGEN SATURATION: 94 % | HEIGHT: 65 IN | WEIGHT: 211 LBS | SYSTOLIC BLOOD PRESSURE: 154 MMHG | HEART RATE: 87 BPM | BODY MASS INDEX: 35.16 KG/M2 | RESPIRATION RATE: 18 BRPM

## 2021-06-05 PROCEDURE — 99213 OFFICE O/P EST LOW 20 MIN: CPT

## 2021-06-05 NOTE — ASSESSMENT
[FreeTextEntry1] : Pt. has multinodular goiter. We have reviewed ultrasounds (6/6/2020)  and the discussed the potential for hyperfunction and need to monitor for changes that could be consistent with neoplasm. Will continue to monitor U/S for stability and function.\par The patient has a history of hypothyroidism though currently is clinically euthyroid with no hypothyroid or hyperthyroid signs or symptoms. The patient's free T4 was mildly elevated at 2.0 and TSH (0.193) suppressed.Risks of subclinical hyperthyroidism (BMD,cardio etc.) and hypothyroidism ( fatigue , muscle aches, weight gain etc.)  discussed in detail and given clinical euthyroid state after discussion pt. is comfortable with current dose but suggested decrease to 0.137. \par \par Pt. has had a low Vit D which is now normal at 37.4 up from  23.8--> 28.3. .  The importance of normal value and need for Vit D supplements of 1000 IU daily was discussed.\par Lipid levels were reviewed with patient and importance and function of LDL, HDL and triglycerides discussed. Methods to increase HDL (exercise, fish, beans, oat meal, legumes etc.) discussed with pt. in conjunction with measures to decrease LDL and triglycerides  including diet and exercise.LDL/HDL was 132/72=> 163/56 . Current regimen of treatment to continue. Risks/benefits  of statins were discussed in detail. \par Pt. has Impaired fasting glucose ( 117) and  current HbA1c is 5.9\par We have discussed diet/exercise and risks related to diabetes in great detail.\par Covid antibody was +  \par Pt with anti ipid abnormality ? acquired. Pt now seeing neurologist.

## 2021-06-08 ENCOUNTER — OUTPATIENT (OUTPATIENT)
Dept: OUTPATIENT SERVICES | Facility: HOSPITAL | Age: 71
LOS: 1 days | Discharge: HOME | End: 2021-06-08

## 2021-06-08 ENCOUNTER — APPOINTMENT (OUTPATIENT)
Dept: MEDICATION MANAGEMENT | Facility: CLINIC | Age: 71
End: 2021-06-08

## 2021-06-08 VITALS — HEART RATE: 72 BPM | RESPIRATION RATE: 16 BRPM | OXYGEN SATURATION: 97 %

## 2021-06-08 DIAGNOSIS — Z79.01 LONG TERM (CURRENT) USE OF ANTICOAGULANTS: ICD-10-CM

## 2021-06-08 DIAGNOSIS — I27.82 CHRONIC PULMONARY EMBOLISM: ICD-10-CM

## 2021-06-08 LAB
INR PPP: 2.6 RATIO
POCT-PROTHROMBIN TIME: 30.9 SECS
QUALITY CONTROL: YES

## 2021-06-22 ENCOUNTER — APPOINTMENT (OUTPATIENT)
Dept: MEDICATION MANAGEMENT | Facility: CLINIC | Age: 71
End: 2021-06-22

## 2021-06-22 ENCOUNTER — OUTPATIENT (OUTPATIENT)
Dept: OUTPATIENT SERVICES | Facility: HOSPITAL | Age: 71
LOS: 1 days | Discharge: HOME | End: 2021-06-22

## 2021-06-22 DIAGNOSIS — Z79.01 LONG TERM (CURRENT) USE OF ANTICOAGULANTS: ICD-10-CM

## 2021-06-22 DIAGNOSIS — I27.82 CHRONIC PULMONARY EMBOLISM: ICD-10-CM

## 2021-06-22 LAB
INR PPP: 2.1 RATIO
POCT-PROTHROMBIN TIME: 24.9 SECS
QUALITY CONTROL: YES

## 2021-07-06 ENCOUNTER — APPOINTMENT (OUTPATIENT)
Dept: MEDICATION MANAGEMENT | Facility: CLINIC | Age: 71
End: 2021-07-06

## 2021-07-06 ENCOUNTER — OUTPATIENT (OUTPATIENT)
Dept: OUTPATIENT SERVICES | Facility: HOSPITAL | Age: 71
LOS: 1 days | Discharge: HOME | End: 2021-07-06

## 2021-07-06 DIAGNOSIS — I27.82 CHRONIC PULMONARY EMBOLISM: ICD-10-CM

## 2021-07-06 DIAGNOSIS — Z79.01 LONG TERM (CURRENT) USE OF ANTICOAGULANTS: ICD-10-CM

## 2021-07-06 LAB
INR PPP: 2.3 RATIO
POCT-PROTHROMBIN TIME: 27.4 SECS
QUALITY CONTROL: YES

## 2021-07-22 ENCOUNTER — APPOINTMENT (OUTPATIENT)
Dept: CARDIOLOGY | Facility: CLINIC | Age: 71
End: 2021-07-22
Payer: MEDICARE

## 2021-07-22 VITALS — BODY MASS INDEX: 35.49 KG/M2 | HEIGHT: 65 IN | WEIGHT: 213 LBS | TEMPERATURE: 96 F

## 2021-07-22 VITALS — HEART RATE: 73 BPM | TEMPERATURE: 96 F | DIASTOLIC BLOOD PRESSURE: 85 MMHG | SYSTOLIC BLOOD PRESSURE: 148 MMHG

## 2021-07-22 PROCEDURE — 93000 ELECTROCARDIOGRAM COMPLETE: CPT

## 2021-07-22 PROCEDURE — 99214 OFFICE O/P EST MOD 30 MIN: CPT

## 2021-07-22 RX ORDER — PANTOPRAZOLE 40 MG/1
40 TABLET, DELAYED RELEASE ORAL
Refills: 0 | Status: DISCONTINUED | COMMUNITY
End: 2021-07-22

## 2021-07-22 RX ORDER — PANTOPRAZOLE 20 MG/1
20 TABLET, DELAYED RELEASE ORAL
Refills: 0 | Status: DISCONTINUED | COMMUNITY
End: 2021-07-22

## 2021-07-22 NOTE — CARDIOLOGY SUMMARY
[___] : [unfilled] [de-identified] : 7- NSR NS T wave change.  [de-identified] : [id number] of the coronary.  CAC score of 0. No CAD . Large hiatal hernia .

## 2021-07-22 NOTE — REVIEW OF SYSTEMS
[Feeling Fatigued] : feeling fatigued [SOB] : shortness of breath [Dyspnea on exertion] : dyspnea during exertion [Cough] : cough [Joint Pain] : joint pain [Negative] : Psychiatric

## 2021-07-22 NOTE — ASSESSMENT
[FreeTextEntry1] : The patient had a CTA of the coronary arteries . She had no CAD but has a large hiatal hernia . She has HICKS and cough  . Etiology is uncertain but may be from her hiatal hernia . She has scoliosis and large hiatal hernia which may cause some component of restriction the lungs as well . She was given Symbicort by pulmonary  The patient has antiphospholipid syndrome and has had PE not provokable . She is on Coumadin

## 2021-07-22 NOTE — HISTORY OF PRESENT ILLNESS
[FreeTextEntry1] : The patient has marked fatigue when she walks. CTA of the coronary arteries are normal CAC score of 0 . She does have a large hiatal hernia . This may explained by her coughing re GERD .

## 2021-07-22 NOTE — REASON FOR VISIT
[Symptom and Test Evaluation] : symptom and test evaluation [Consultation] : a consultation regarding [Hypertension] : hypertension [FreeTextEntry3] : Dr. Orozco  [FreeTextEntry1] : S/P PE

## 2021-08-03 ENCOUNTER — APPOINTMENT (OUTPATIENT)
Dept: MEDICATION MANAGEMENT | Facility: CLINIC | Age: 71
End: 2021-08-03

## 2021-08-03 ENCOUNTER — OUTPATIENT (OUTPATIENT)
Dept: OUTPATIENT SERVICES | Facility: HOSPITAL | Age: 71
LOS: 1 days | Discharge: HOME | End: 2021-08-03

## 2021-08-03 DIAGNOSIS — I27.82 CHRONIC PULMONARY EMBOLISM: ICD-10-CM

## 2021-08-03 DIAGNOSIS — Z79.01 LONG TERM (CURRENT) USE OF ANTICOAGULANTS: ICD-10-CM

## 2021-08-03 LAB
INR PPP: 2 RATIO
POCT-PROTHROMBIN TIME: 24 SECS
QUALITY CONTROL: YES

## 2021-08-18 RX ORDER — PANTOPRAZOLE 40 MG/1
40 TABLET, DELAYED RELEASE ORAL
Qty: 30 | Refills: 2 | Status: ACTIVE | COMMUNITY
Start: 2021-07-22 | End: 1900-01-01

## 2021-08-31 ENCOUNTER — APPOINTMENT (OUTPATIENT)
Dept: MEDICATION MANAGEMENT | Facility: CLINIC | Age: 71
End: 2021-08-31

## 2021-08-31 ENCOUNTER — OUTPATIENT (OUTPATIENT)
Dept: OUTPATIENT SERVICES | Facility: HOSPITAL | Age: 71
LOS: 1 days | Discharge: HOME | End: 2021-08-31

## 2021-08-31 DIAGNOSIS — I27.82 CHRONIC PULMONARY EMBOLISM: ICD-10-CM

## 2021-08-31 DIAGNOSIS — Z79.01 LONG TERM (CURRENT) USE OF ANTICOAGULANTS: ICD-10-CM

## 2021-08-31 LAB
INR PPP: 2.7 RATIO
POCT-PROTHROMBIN TIME: 32.2 SECS
QUALITY CONTROL: YES

## 2021-09-10 ENCOUNTER — RX RENEWAL (OUTPATIENT)
Age: 71
End: 2021-09-10

## 2021-09-21 ENCOUNTER — APPOINTMENT (OUTPATIENT)
Dept: MEDICATION MANAGEMENT | Facility: CLINIC | Age: 71
End: 2021-09-21

## 2021-09-21 ENCOUNTER — OUTPATIENT (OUTPATIENT)
Dept: OUTPATIENT SERVICES | Facility: HOSPITAL | Age: 71
LOS: 1 days | Discharge: HOME | End: 2021-09-21

## 2021-09-21 DIAGNOSIS — I27.82 CHRONIC PULMONARY EMBOLISM: ICD-10-CM

## 2021-09-21 DIAGNOSIS — Z79.01 LONG TERM (CURRENT) USE OF ANTICOAGULANTS: ICD-10-CM

## 2021-09-21 LAB
INR PPP: 2.9 RATIO
POCT-PROTHROMBIN TIME: 35 SECS
QUALITY CONTROL: YES

## 2021-10-12 ENCOUNTER — APPOINTMENT (OUTPATIENT)
Dept: MEDICATION MANAGEMENT | Facility: CLINIC | Age: 71
End: 2021-10-12

## 2021-10-12 ENCOUNTER — OUTPATIENT (OUTPATIENT)
Dept: OUTPATIENT SERVICES | Facility: HOSPITAL | Age: 71
LOS: 1 days | Discharge: HOME | End: 2021-10-12

## 2021-10-12 DIAGNOSIS — Z79.01 LONG TERM (CURRENT) USE OF ANTICOAGULANTS: ICD-10-CM

## 2021-10-12 DIAGNOSIS — I27.82 CHRONIC PULMONARY EMBOLISM: ICD-10-CM

## 2021-10-12 LAB
INR PPP: 2.5 RATIO
POCT-PROTHROMBIN TIME: 29.4 SECS
QUALITY CONTROL: YES

## 2021-10-13 ENCOUNTER — APPOINTMENT (OUTPATIENT)
Dept: GASTROENTEROLOGY | Facility: CLINIC | Age: 71
End: 2021-10-13

## 2021-11-02 ENCOUNTER — OUTPATIENT (OUTPATIENT)
Dept: OUTPATIENT SERVICES | Facility: HOSPITAL | Age: 71
LOS: 1 days | Discharge: HOME | End: 2021-11-02

## 2021-11-02 ENCOUNTER — APPOINTMENT (OUTPATIENT)
Dept: MEDICATION MANAGEMENT | Facility: CLINIC | Age: 71
End: 2021-11-02

## 2021-11-02 DIAGNOSIS — I27.82 CHRONIC PULMONARY EMBOLISM: ICD-10-CM

## 2021-11-02 DIAGNOSIS — Z79.01 LONG TERM (CURRENT) USE OF ANTICOAGULANTS: ICD-10-CM

## 2021-11-02 LAB
INR PPP: 2.4 RATIO
POCT-PROTHROMBIN TIME: 28.5 SECS
QUALITY CONTROL: YES

## 2021-11-10 ENCOUNTER — APPOINTMENT (OUTPATIENT)
Dept: GASTROENTEROLOGY | Facility: CLINIC | Age: 71
End: 2021-11-10
Payer: MEDICARE

## 2021-11-10 VITALS — HEIGHT: 65 IN

## 2021-11-10 PROCEDURE — 99204 OFFICE O/P NEW MOD 45 MIN: CPT

## 2021-11-10 NOTE — PHYSICAL EXAM
[General Appearance - Alert] : alert [Sclera] : the sclera and conjunctiva were normal [Outer Ear] : the ears and nose were normal in appearance [Neck Appearance] : the appearance of the neck was normal [] : no respiratory distress [Apical Impulse] : the apical impulse was normal [Heart Sounds] : normal S1 and S2 [Abdomen Tenderness] : non-tender [No CVA Tenderness] : no ~M costovertebral angle tenderness [Abnormal Walk] : normal gait [Skin Color & Pigmentation] : normal skin color and pigmentation [No Focal Deficits] : no focal deficits [Oriented To Time, Place, And Person] : oriented to person, place, and time

## 2021-11-10 NOTE — HISTORY OF PRESENT ILLNESS
[de-identified] : Patient is a 71-year-old female with past medical history of hypertension, hyperlipidemia, Hashimoto's, antiphospholipid syndrome, and pulmonary embolism requiring ICU admission currently on Coumadin who was referred for evaluation of cough and to check for a hiatal hernia.  Patient has never had endoscopy nor colonoscopy.  Patient followed closely by hematology, cardiology, and pulmonary specialists.

## 2021-11-10 NOTE — ASSESSMENT
[FreeTextEntry1] : Patient is a 71-year-old female with past medical history of hypertension, hyperlipidemia, Hashimoto's, antiphospholipid syndrome, and pulmonary embolism requiring ICU admission currently on Coumadin who was referred for evaluation of cough and to check for a hiatal hernia.  Patient has never had endoscopy nor colonoscopy.  Patient followed closely by hematology, cardiology, and pulmonary specialists. Will setup for EGD. Will need to follow with Coumadin clinic for INR to be 1.5. \par \par Hiatal Hernia/ Intra thoracic stomach \par - Imaging noted 2/3 stomach intra thoracic hernia\par - Setup for EGD\par - Coumadin center for bridging, INR level needs to be 1.5

## 2021-11-15 ENCOUNTER — RX RENEWAL (OUTPATIENT)
Age: 71
End: 2021-11-15

## 2021-11-16 ENCOUNTER — APPOINTMENT (OUTPATIENT)
Dept: CARDIOTHORACIC SURGERY | Facility: CLINIC | Age: 71
End: 2021-11-16
Payer: MEDICARE

## 2021-11-16 VITALS
WEIGHT: 213 LBS | SYSTOLIC BLOOD PRESSURE: 151 MMHG | TEMPERATURE: 98.2 F | RESPIRATION RATE: 18 BRPM | OXYGEN SATURATION: 94 % | DIASTOLIC BLOOD PRESSURE: 85 MMHG | HEART RATE: 87 BPM | HEIGHT: 65 IN | BODY MASS INDEX: 35.49 KG/M2

## 2021-11-16 PROCEDURE — 99203 OFFICE O/P NEW LOW 30 MIN: CPT

## 2021-11-16 NOTE — PHYSICAL EXAM
[General Appearance - Alert] : alert [General Appearance - In No Acute Distress] : in no acute distress [General Appearance - Well Nourished] : well nourished [Sclera] : the sclera and conjunctiva were normal [PERRL With Normal Accommodation] : pupils were equal in size, round, and reactive to light [Outer Ear] : the ears and nose were normal in appearance [Respiration, Rhythm And Depth] : normal respiratory rhythm and effort [Exaggerated Use Of Accessory Muscles For Inspiration] : no accessory muscle use [Apical Impulse] : the apical impulse was normal [Heart Rate And Rhythm] : heart rate was normal and rhythm regular [Heart Sounds] : normal S1 and S2 [Examination Of The Chest] : the chest was normal in appearance [Bowel Sounds] : normal bowel sounds [Abdomen Soft] : soft [Abdomen Tenderness] : non-tender [Abnormal Walk] : normal gait [Nail Clubbing] : no clubbing  or cyanosis of the fingernails [Musculoskeletal - Swelling] : no joint swelling seen [Skin Color & Pigmentation] : normal skin color and pigmentation [Skin Turgor] : normal skin turgor [] : no rash [Cranial Nerves] : cranial nerves 2-12 were intact [Deep Tendon Reflexes (DTR)] : deep tendon reflexes were 2+ and symmetric [Sensation] : the sensory exam was normal to light touch and pinprick [Oriented To Time, Place, And Person] : oriented to person, place, and time [Impaired Insight] : insight and judgment were intact [Affect] : the affect was normal [Neck Appearance] : the appearance of the neck was normal [No CVA Tenderness] : no ~M costovertebral angle tenderness

## 2021-11-18 NOTE — REASON FOR VISIT
Quality 431: Preventive Care And Screening: Unhealthy Alcohol Use - Screening: Patient screened for unhealthy alcohol use using a single question and scores less than 2 times per year Detail Level: Detailed Quality 130: Documentation Of Current Medications In The Medical Record: Current Medications Documented Quality 226: Preventive Care And Screening: Tobacco Use: Screening And Cessation Intervention: Patient screened for tobacco use and is an ex/non-smoker [Consultation] : a consultation visit [FreeTextEntry1] : Large Hiatal Hernia

## 2021-11-18 NOTE — HISTORY OF PRESENT ILLNESS
[FreeTextEntry1] : Ms. SHAYAN VILLAFUERTE is a 71 year F, former smoker, that arrives today for a consultation for a large Hiatal Hernia.  Workup- began after January 2020 when patient thinks she may have had COVID (prior to any testing), was admitted at that time with cough, extreme fatigue and Pulmonary embolism- was found to have anti-phospholipid syndome.  Patient was seen by Dr. Miller for hiatal hernia found on CTA done by her Cardiologist. Her PMH includes HTN, HLD, Hypothyroidism, Scoliosis Antiphospholipid Syndrome- HX PE on Coumadin,  Here today for surgical discussion.  \par \par Preop Symptoms- Belching, Bloating , Flatulence, Gagging, no vomiting, currently swallowing all consistencies without dysphagia, has mild reflux/heartburn- on Protonix with some relief\par He worst symptom is her frequent dry cough\par Former Smoker 1/2 PPD X20 years - quit 20 years ago\par Ambulates with walker, lives home with her  \par ECOG 2\par \par Their Healthcare team is as follows:\par PMD: Dr. Sreekanth Orozco \par Cardiologist:Dr. Bernal\par Pulmonologist:  Dr. Avila\par Heme/Onc: Dr. Moody\par Gastroenterologist: Dr. Miller\par \par Coumadin managed by [facility]\par

## 2021-11-18 NOTE — ASSESSMENT
[FreeTextEntry1] : Ms. SHAYAN VILLAFUERTE is a 71 year F, former smoker, that arrives today for a consultation for a large Hiatal Hernia.  Patient was seen by their Gastroenterologist for complaints of cough/reflux.  Her PMH includes HTN, HLD, Hypothyroidism, Antiphospholipid Syndrome- HX PE on Coumadin,  Here today for surgical discussion.  \par \par Plan:\par CTA Imaging reviewed with patient\par Has EGD scheduled for 12/13 with Dr. Miller (being bridged with Lovenox)\par Will plan for Esophagram, Cardiac Risk Stratification, Pulmonary Evaluation/PFTs and Hematology clearance\par F/U after testing for surgical planning\par Will need Cardiac Risk Stratification with Dr. Bernal prior to surgery\par F/U with PMD for routine medical care\par \par I, Ayala Hanson [facility], am acting as scribe for Dr.Villa Mccracken\par I, Michel Mccracken saw, examined and reviewed the diagnostic images on patient:  SHAYAN VILLAFUERTE on 11/16/2021 and agreed with my Nurse Practitioner's clinical note, physical exam findings and treatment plan.\par Ms. Villafuerte was referred to me with diagnosis of symptomatic large hiatal hernia, patient endorses worsening shortness of breath for the past two years, also dry cough, heartburn, early satiety but no dysphagia.\par CT shows: large hiatal hernia. I reviewed the diagnostic images with the patient and recommended surgical repair, I explained laparoscopic hiatal hernia repair, fundoplication/gastropexy. patient is to undergo EGD. Presurgical assessment started. cardiac, pulmonary and hematology consultations.

## 2021-11-18 NOTE — REVIEW OF SYSTEMS
[Cough] : cough [Negative] : ENT [Fever] : no fever [Chills] : no chills [Feeling Poorly] : not feeling poorly [Feeling Tired] : not feeling tired [Eye Pain] : no eye pain [Red Eyes] : eyes not red [Heart Rate Is Slow] : the heart rate was not slow [Heart Rate Is Fast] : the heart rate was not fast [Chest Pain] : no chest pain [Palpitations] : no palpitations [Shortness Of Breath] : no shortness of breath [Wheezing] : no wheezing [SOB on Exertion] : no shortness of breath during exertion

## 2021-11-30 ENCOUNTER — OUTPATIENT (OUTPATIENT)
Dept: OUTPATIENT SERVICES | Facility: HOSPITAL | Age: 71
LOS: 1 days | Discharge: HOME | End: 2021-11-30

## 2021-11-30 ENCOUNTER — APPOINTMENT (OUTPATIENT)
Dept: MEDICATION MANAGEMENT | Facility: CLINIC | Age: 71
End: 2021-11-30

## 2021-11-30 DIAGNOSIS — I27.82 CHRONIC PULMONARY EMBOLISM: ICD-10-CM

## 2021-11-30 DIAGNOSIS — Z79.01 LONG TERM (CURRENT) USE OF ANTICOAGULANTS: ICD-10-CM

## 2021-11-30 LAB
INR PPP: 2.3 RATIO
POCT-PROTHROMBIN TIME: 27.4 SECS
QUALITY CONTROL: YES

## 2021-12-10 ENCOUNTER — OUTPATIENT (OUTPATIENT)
Dept: OUTPATIENT SERVICES | Facility: HOSPITAL | Age: 71
LOS: 1 days | Discharge: HOME | End: 2021-12-10

## 2021-12-10 ENCOUNTER — APPOINTMENT (OUTPATIENT)
Dept: MEDICATION MANAGEMENT | Facility: CLINIC | Age: 71
End: 2021-12-10

## 2021-12-10 DIAGNOSIS — Z79.01 LONG TERM (CURRENT) USE OF ANTICOAGULANTS: ICD-10-CM

## 2021-12-10 DIAGNOSIS — I27.82 CHRONIC PULMONARY EMBOLISM: ICD-10-CM

## 2021-12-10 LAB
INR PPP: 1.4 RATIO
POCT-PROTHROMBIN TIME: 14.1 SECS
QUALITY CONTROL: YES

## 2021-12-13 ENCOUNTER — OUTPATIENT (OUTPATIENT)
Dept: OUTPATIENT SERVICES | Facility: HOSPITAL | Age: 71
LOS: 1 days | Discharge: HOME | End: 2021-12-13
Payer: MEDICARE

## 2021-12-13 ENCOUNTER — RESULT REVIEW (OUTPATIENT)
Age: 71
End: 2021-12-13

## 2021-12-13 ENCOUNTER — TRANSCRIPTION ENCOUNTER (OUTPATIENT)
Age: 71
End: 2021-12-13

## 2021-12-13 VITALS — SYSTOLIC BLOOD PRESSURE: 156 MMHG | DIASTOLIC BLOOD PRESSURE: 70 MMHG | HEART RATE: 73 BPM | RESPIRATION RATE: 24 BRPM

## 2021-12-13 VITALS — WEIGHT: 220.46 LBS

## 2021-12-13 PROCEDURE — 88305 TISSUE EXAM BY PATHOLOGIST: CPT | Mod: 26

## 2021-12-13 PROCEDURE — 43239 EGD BIOPSY SINGLE/MULTIPLE: CPT

## 2021-12-13 PROCEDURE — 88312 SPECIAL STAINS GROUP 1: CPT | Mod: 26

## 2021-12-13 NOTE — CHART NOTE - NSCHARTNOTEFT_GEN_A_CORE
PACU ANESTHESIA ADMISSION NOTE      Procedure:   Post op diagnosis:      ____  Intubated  TV:______       Rate: ______      FiO2: ______    __x__  Patent Airway    __x__  Full return of protective reflexes    __x__  Full recovery from anesthesia / back to baseline status    Vitals:  T(C): 36.2 (12-13-21 @ 13:48), Max: 36.2 (12-13-21 @ 13:48)  HR: 72 (12-13-21 @ 13:48) (72 - 72)  BP: 142/82 (12-13-21 @ 13:48) (142/82 - 142/82)  RR: 18 (12-13-21 @ 13:48) (18 - 18)  SpO2: --    Mental Status:  __x__ Awake   ___x__ Alert   _____ Drowsy   _____ Sedated    Nausea/Vomiting:  __x__ NO  ______Yes,   See Post - Op Orders          Pain Scale (0-10):  _____    Treatment: ____ None    __x__ See Post - Op/PCA Orders    Post - Operative Fluids:   ____ Oral   __x__ See Post - Op Orders    Plan: Discharge:   _X___Home       _____Floor     _____Critical Care    _____  Other:_________________    Comments: Patient had smooth intraoperative event, no anesthesia complication.  PACU Vital signs: HR:    72        BP:   116     /68        RR:  18          O2 Sat:   99    %     Temp 36c

## 2021-12-13 NOTE — ASU PATIENT PROFILE, ADULT - FALL HARM RISK - RISK INTERVENTIONS

## 2021-12-13 NOTE — ASU PATIENT PROFILE, ADULT - NSICDXPASTMEDICALHX_GEN_ALL_CORE_FT
PAST MEDICAL HISTORY:  H/O Hashimoto thyroiditis     Hypertension     Scoliosis     SOB (shortness of breath)

## 2021-12-13 NOTE — PRE-ANESTHESIA EVALUATION ADULT - NSANTHHOMEMEDSFT_GEN_ALL_CORE
Pt reports taking warfarin for her PE and stopped last Wednesday and taking Lovenox inj. last lovenox yesterday morning

## 2021-12-13 NOTE — ASU DISCHARGE PLAN (ADULT/PEDIATRIC) - NS MD DC FALL RISK RISK
For information on Fall & Injury Prevention, visit: [email or web address] OR  [email or web address] OR  https://www.cdc.gov/steadi/patient.html

## 2021-12-14 ENCOUNTER — RX RENEWAL (OUTPATIENT)
Age: 71
End: 2021-12-14

## 2021-12-14 LAB — SURGICAL PATHOLOGY STUDY: SIGNIFICANT CHANGE UP

## 2021-12-16 DIAGNOSIS — K29.80 DUODENITIS WITHOUT BLEEDING: ICD-10-CM

## 2021-12-16 DIAGNOSIS — Z86.711 PERSONAL HISTORY OF PULMONARY EMBOLISM: ICD-10-CM

## 2021-12-16 DIAGNOSIS — K29.50 UNSPECIFIED CHRONIC GASTRITIS WITHOUT BLEEDING: ICD-10-CM

## 2021-12-16 DIAGNOSIS — E06.3 AUTOIMMUNE THYROIDITIS: ICD-10-CM

## 2021-12-16 DIAGNOSIS — I10 ESSENTIAL (PRIMARY) HYPERTENSION: ICD-10-CM

## 2021-12-16 DIAGNOSIS — K20.90 ESOPHAGITIS, UNSPECIFIED WITHOUT BLEEDING: ICD-10-CM

## 2021-12-16 DIAGNOSIS — K22.10 ULCER OF ESOPHAGUS WITHOUT BLEEDING: ICD-10-CM

## 2021-12-16 DIAGNOSIS — Z79.01 LONG TERM (CURRENT) USE OF ANTICOAGULANTS: ICD-10-CM

## 2021-12-16 DIAGNOSIS — K44.9 DIAPHRAGMATIC HERNIA WITHOUT OBSTRUCTION OR GANGRENE: ICD-10-CM

## 2021-12-17 ENCOUNTER — APPOINTMENT (OUTPATIENT)
Dept: MEDICATION MANAGEMENT | Facility: CLINIC | Age: 71
End: 2021-12-17

## 2021-12-17 ENCOUNTER — OUTPATIENT (OUTPATIENT)
Dept: OUTPATIENT SERVICES | Facility: HOSPITAL | Age: 71
LOS: 1 days | Discharge: HOME | End: 2021-12-17

## 2021-12-17 VITALS — OXYGEN SATURATION: 95 % | RESPIRATION RATE: 16 BRPM | HEART RATE: 81 BPM

## 2021-12-17 DIAGNOSIS — I27.82 CHRONIC PULMONARY EMBOLISM: ICD-10-CM

## 2021-12-17 DIAGNOSIS — Z79.01 LONG TERM (CURRENT) USE OF ANTICOAGULANTS: ICD-10-CM

## 2021-12-17 PROBLEM — M41.9 SCOLIOSIS, UNSPECIFIED: Chronic | Status: ACTIVE | Noted: 2021-12-13

## 2021-12-17 PROBLEM — R06.02 SHORTNESS OF BREATH: Chronic | Status: ACTIVE | Noted: 2021-12-13

## 2021-12-17 LAB
INR PPP: 1.4 RATIO
POCT-PROTHROMBIN TIME: 16.7 SECS
QUALITY CONTROL: YES

## 2021-12-18 ENCOUNTER — APPOINTMENT (OUTPATIENT)
Dept: ENDOCRINOLOGY | Facility: CLINIC | Age: 71
End: 2021-12-18
Payer: MEDICARE

## 2021-12-18 VITALS
WEIGHT: 216 LBS | BODY MASS INDEX: 35.99 KG/M2 | HEIGHT: 65 IN | SYSTOLIC BLOOD PRESSURE: 150 MMHG | DIASTOLIC BLOOD PRESSURE: 80 MMHG | TEMPERATURE: 97.2 F | OXYGEN SATURATION: 97 % | HEART RATE: 75 BPM

## 2021-12-18 PROCEDURE — 99213 OFFICE O/P EST LOW 20 MIN: CPT

## 2021-12-18 NOTE — ASSESSMENT
[FreeTextEntry1] : Pt. has multinodular goiter. We have reviewed ultrasounds (6/6/2020)  and the discussed the potential for hyperfunction and need to monitor for changes that could be consistent with neoplasm. Will continue to monitor U/S for stability and function.\par The patient has a history of hypothyroidism though currently is clinically euthyroid with no hypothyroid or hyperthyroid signs or symptoms. The patient's free T4 was mildly elevated at 1.78 and at TSH (0.104) was suppressed.Risks of subclinical hyperthyroidism (BMD,cardio etc.) and hypothyroidism ( fatigue , muscle aches, weight gain etc.)  discussed in detail and given clinical euthyroid state after discussion pt. is comfortable with current dose but suggested decrease to 0.137. \par \par Pt. has had a low Vit D which previously was   normal at 37.4 up from  23.8--> 28.3. .  The importance of normal value and need for Vit D supplements of 1000 IU daily was discussed.\par Lipid levels were reviewed with patient and importance and function of LDL, HDL and triglycerides discussed. Methods to increase HDL (exercise, fish, beans, oat meal, legumes etc.) discussed with pt. in conjunction with measures to decrease LDL and triglycerides  including diet and exercise.LDL/HDL was 11/57 from 154/56 . Current regimen of treatment to continue. Risks/benefits  of statins were discussed in detail. \par Pt. has Impaired fasting glucose ( 117-> 114 ) and  current HbA1c is 5.8 from 5.9\par We have discussed diet/exercise and risks related to diabetes in great detail.\par Covid antibody was +  \par Pt with anti-lipid abnormality ? acquired. Pt now seeing neurologist.

## 2021-12-18 NOTE — PHYSICAL EXAM
[Alert] : alert [Well Nourished] : well nourished [No Acute Distress] : no acute distress [Well Developed] : well developed [Normal Sclera/Conjunctiva] : normal sclera/conjunctiva [EOMI] : extra ocular movement intact [No Proptosis] : no proptosis [Normal Oropharynx] : the oropharynx was normal [No Respiratory Distress] : no respiratory distress [No Accessory Muscle Use] : no accessory muscle use [Clear to Auscultation] : lungs were clear to auscultation bilaterally [Normal S1, S2] : normal S1 and S2 [Normal Rate] : heart rate was normal [Regular Rhythm] : with a regular rhythm [No Edema] : no peripheral edema [Pedal Pulses Normal] : the pedal pulses are present [Normal Bowel Sounds] : normal bowel sounds [Not Tender] : non-tender [Not Distended] : not distended [Soft] : abdomen soft [Normal Anterior Cervical Nodes] : no anterior cervical lymphadenopathy [No Spinal Tenderness] : no spinal tenderness [Spine Straight] : spine straight [No Stigmata of Cushings Syndrome] : no stigmata of Cushings Syndrome [Normal Gait] : normal gait [Normal Strength/Tone] : muscle strength and tone were normal [No Rash] : no rash [Normal Reflexes] : deep tendon reflexes were 2+ and symmetric [No Tremors] : no tremors [Oriented x3] : oriented to person, place, and time [Acanthosis Nigricans] : no acanthosis nigricans [de-identified] :  nodular thyroid disease. L nodule 3 cm

## 2021-12-21 ENCOUNTER — OUTPATIENT (OUTPATIENT)
Dept: OUTPATIENT SERVICES | Facility: HOSPITAL | Age: 71
LOS: 1 days | Discharge: HOME | End: 2021-12-21

## 2021-12-21 ENCOUNTER — APPOINTMENT (OUTPATIENT)
Dept: MEDICATION MANAGEMENT | Facility: CLINIC | Age: 71
End: 2021-12-21

## 2021-12-21 VITALS — HEART RATE: 76 BPM | OXYGEN SATURATION: 96 % | RESPIRATION RATE: 16 BRPM

## 2021-12-21 DIAGNOSIS — I27.82 CHRONIC PULMONARY EMBOLISM: ICD-10-CM

## 2021-12-21 DIAGNOSIS — Z79.01 LONG TERM (CURRENT) USE OF ANTICOAGULANTS: ICD-10-CM

## 2021-12-21 LAB
INR PPP: 1.9 RATIO
POCT-PROTHROMBIN TIME: 22.4 SECS
QUALITY CONTROL: YES

## 2021-12-27 ENCOUNTER — OUTPATIENT (OUTPATIENT)
Dept: OUTPATIENT SERVICES | Facility: HOSPITAL | Age: 71
LOS: 1 days | Discharge: HOME | End: 2021-12-27

## 2021-12-27 ENCOUNTER — APPOINTMENT (OUTPATIENT)
Dept: MEDICATION MANAGEMENT | Facility: CLINIC | Age: 71
End: 2021-12-27

## 2021-12-27 DIAGNOSIS — Z79.01 LONG TERM (CURRENT) USE OF ANTICOAGULANTS: ICD-10-CM

## 2021-12-27 DIAGNOSIS — I27.82 CHRONIC PULMONARY EMBOLISM: ICD-10-CM

## 2021-12-27 LAB
INR PPP: 2.6 RATIO
POCT-PROTHROMBIN TIME: 31 SECS
QUALITY CONTROL: YES

## 2022-01-18 ENCOUNTER — APPOINTMENT (OUTPATIENT)
Dept: MEDICATION MANAGEMENT | Facility: CLINIC | Age: 72
End: 2022-01-18

## 2022-01-19 ENCOUNTER — APPOINTMENT (OUTPATIENT)
Age: 72
End: 2022-01-19
Payer: MEDICARE

## 2022-01-19 VITALS
HEART RATE: 80 BPM | HEIGHT: 65 IN | OXYGEN SATURATION: 95 % | BODY MASS INDEX: 35.99 KG/M2 | RESPIRATION RATE: 12 BRPM | SYSTOLIC BLOOD PRESSURE: 130 MMHG | DIASTOLIC BLOOD PRESSURE: 80 MMHG | WEIGHT: 216 LBS

## 2022-01-19 PROCEDURE — 71046 X-RAY EXAM CHEST 2 VIEWS: CPT

## 2022-01-19 PROCEDURE — 99214 OFFICE O/P EST MOD 30 MIN: CPT | Mod: 25

## 2022-01-19 RX ORDER — MOMETASONE FUROATE AND FORMOTEROL FUMARATE DIHYDRATE 200; 5 UG/1; UG/1
200-5 AEROSOL RESPIRATORY (INHALATION)
Qty: 1 | Refills: 5 | Status: ACTIVE | COMMUNITY
Start: 2022-01-19 | End: 1900-01-01

## 2022-01-19 RX ORDER — BUDESONIDE AND FORMOTEROL FUMARATE DIHYDRATE 160; 4.5 UG/1; UG/1
160-4.5 AEROSOL RESPIRATORY (INHALATION)
Qty: 3 | Refills: 3 | Status: DISCONTINUED | COMMUNITY
Start: 2021-03-11 | End: 2022-01-19

## 2022-01-19 NOTE — HISTORY OF PRESENT ILLNESS
[Mild Persistent] : mild persistent [Doing Well] : doing well [Well Controlled] : Well controlled [Checks Regularly] : The patient checks ~his/her~ peak flow regularly [Good Control] : peak flow has been good [Goals--Doing Well] : the patient is not doing well with ~his/her~ asthma goals [PFTs] : pulmonary function tests [Follow-Up - Routine Clinic] : a routine clinic follow-up of [None] : No associated symptoms are reported

## 2022-01-19 NOTE — PROCEDURE
[FreeTextEntry1] : CXR PA and Lateral \par The costophrenic and cardiophrenic angles are sharp\par The richie parenchyma shows no infiltrates, consolidations, or nodules \par The Mediastinum is within normal limits\par No pleural effusions\par HH

## 2022-01-19 NOTE — ASSESSMENT
[FreeTextEntry1] : Mild persistent asthma poor compensated secondary to compliance \par HO Unprovoked PE.  APL.  FOllowed by hem onc.  On Coumadin.\par Being evaluated for HH repair by CTS

## 2022-01-20 ENCOUNTER — OUTPATIENT (OUTPATIENT)
Dept: OUTPATIENT SERVICES | Facility: HOSPITAL | Age: 72
LOS: 1 days | Discharge: HOME | End: 2022-01-20

## 2022-01-20 ENCOUNTER — APPOINTMENT (OUTPATIENT)
Dept: MEDICATION MANAGEMENT | Facility: CLINIC | Age: 72
End: 2022-01-20

## 2022-01-20 ENCOUNTER — APPOINTMENT (OUTPATIENT)
Dept: HEMATOLOGY ONCOLOGY | Facility: CLINIC | Age: 72
End: 2022-01-20
Payer: MEDICARE

## 2022-01-20 VITALS
HEIGHT: 65 IN | BODY MASS INDEX: 35.99 KG/M2 | DIASTOLIC BLOOD PRESSURE: 74 MMHG | TEMPERATURE: 97.3 F | WEIGHT: 216 LBS | SYSTOLIC BLOOD PRESSURE: 163 MMHG | RESPIRATION RATE: 14 BRPM | HEART RATE: 79 BPM

## 2022-01-20 DIAGNOSIS — Z79.01 LONG TERM (CURRENT) USE OF ANTICOAGULANTS: ICD-10-CM

## 2022-01-20 DIAGNOSIS — I27.82 CHRONIC PULMONARY EMBOLISM: ICD-10-CM

## 2022-01-20 LAB
INR PPP: 2.3 RATIO
POCT-PROTHROMBIN TIME: 28 SECS
QUALITY CONTROL: YES

## 2022-01-20 PROCEDURE — 99213 OFFICE O/P EST LOW 20 MIN: CPT

## 2022-01-20 NOTE — ASSESSMENT
[FreeTextEntry1] : Antiphospholipid syndrome, on indefinite anticoagulation. Her work up was somewhat confusing in the sense that her PTT was elevated and not corrected with mixing studies but all the other tests were negative. I\par In addition, she has aggravating factors such as her obesity and is not doing much physically.\par For the time being, given also the social situation, the patient should continue with anticoagulation. Since she seems to be doing well with Coumadin, I don't see any reason for making any change.\par \par All her questions answered.\par \par Follow up in 6 months.

## 2022-01-20 NOTE — REVIEW OF SYSTEMS
[Fatigue] : fatigue [Recent Change In Weight] : ~T recent weight change [FreeTextEntry2] : Gained weight

## 2022-01-20 NOTE — PHYSICAL EXAM
[Ambulatory and capable of all self care but unable to carry out any work activities] : Status 2- Ambulatory and capable of all self care but unable to carry out any work activities. Up and about more than 50% of waking hours [Obese] : obese [Normal] : affect appropriate [de-identified] : Obese [de-identified] : Arthritic changes noted. [de-identified] : Using a walker for ambulation.

## 2022-01-20 NOTE — HISTORY OF PRESENT ILLNESS
[Disease:__________________________] : Disease: [unfilled] [de-identified] : The patient is coming for her regularly scheduled follow up for her thrombophilia.\par She  may have to undergo hiatal hernia surgery soon. She was having persistent cough which was eventually thought to be from reflux disease although the patient was told that she may still have a cough despite the correction of the reflux because of her asthma. She was told that her hiatal hernia may be an aggravating factor in terms of her reflux and cough and may be she needed correction of the hernia. However nothing has been finalized.\par She is still on Coumadin because of her antiphospholipid syndrome.\par Her major issue seems to be her breathing which is worse with physical activity but also at rest, especially when it rains and the humidity rises. She was told that she should be on Symbicort but the patient states that, when she takes it she cannot urinate. \par No recent bleeding or thrombotic events.

## 2022-01-21 DIAGNOSIS — D68.59 OTHER PRIMARY THROMBOPHILIA: ICD-10-CM

## 2022-01-28 ENCOUNTER — LABORATORY RESULT (OUTPATIENT)
Age: 72
End: 2022-01-28

## 2022-01-31 ENCOUNTER — OUTPATIENT (OUTPATIENT)
Dept: OUTPATIENT SERVICES | Facility: HOSPITAL | Age: 72
LOS: 1 days | Discharge: HOME | End: 2022-01-31
Payer: MEDICARE

## 2022-01-31 DIAGNOSIS — R06.02 SHORTNESS OF BREATH: ICD-10-CM

## 2022-01-31 PROCEDURE — 94727 GAS DIL/WSHOT DETER LNG VOL: CPT | Mod: 26

## 2022-01-31 PROCEDURE — 94060 EVALUATION OF WHEEZING: CPT | Mod: 26

## 2022-01-31 PROCEDURE — 94729 DIFFUSING CAPACITY: CPT | Mod: 26

## 2022-02-01 RX ORDER — ENOXAPARIN SODIUM 100 MG/ML
100 INJECTION SUBCUTANEOUS
Qty: 10 | Refills: 3 | Status: DISCONTINUED | COMMUNITY
Start: 2021-11-30 | End: 2022-02-01

## 2022-02-02 ENCOUNTER — APPOINTMENT (OUTPATIENT)
Dept: GASTROENTEROLOGY | Facility: CLINIC | Age: 72
End: 2022-02-02
Payer: MEDICARE

## 2022-02-02 PROCEDURE — 99443: CPT | Mod: 95

## 2022-02-02 NOTE — HISTORY OF PRESENT ILLNESS
[FreeTextEntry4] : Anju [de-identified] : 11/10/21 [de-identified] : Patient is a 71-year-old female with past medical history of hypertension, hyperlipidemia, Hashimoto's, antiphospholipid syndrome, and pulmonary embolism requiring ICU admission currently on Coumadin who was referred for evaluation of cough and to check for a hiatal hernia.  Patient had endoscopic evaluation which was notable for a large hiatal hernia which was also tortuous. The additional portion of the stomach and the duodenum with just mild inflammation. Patient was evaluated by Dr. Jose Mccracken who was preparing her for possible intervention on her hiatal hernia. Since the evaluation patient developed anemia a little bit lower than baseline without tito blood in her stools nor black stools. Patient feels well and does not exhibit symptoms of symptomatic anemia including palpitations or shortness of breath. Patient was offered biopsy in the past colonoscopy which she declined. Patient also notes that after her last endoscopy the bridging process for her anticoagulation was very troublesome and she would decline doing this again if colonoscopy was suggested.

## 2022-02-02 NOTE — ASSESSMENT
[FreeTextEntry1] : Patient is a 71-year-old female with past medical history of hypertension, hyperlipidemia, Hashimoto's, antiphospholipid syndrome, and pulmonary embolism requiring ICU admission currently on Coumadin who was referred for evaluation of cough and to check for a hiatal hernia.  Patient had endoscopic evaluation which was notable for a large hiatal hernia which was also tortuous. The additional portion of the stomach and the duodenum with just mild inflammation. Patient was evaluated by Dr. Jose Mccracken who was preparing her for possible intervention on her hiatal hernia. Since the evaluation patient developed anemia a little bit lower than baseline without tito blood in her stools nor black stools. Patient feels well and does not exhibit symptoms of symptomatic anemia including palpitations or shortness of breath. Patient was offered biopsy in the past colonoscopy which she declined. Patient also notes that after her last endoscopy the bridging process for her anticoagulation was very troublesome and she would decline doing this again if colonoscopy was suggested. Patient agreed to capsule study 1st which we will set her up for. Patient will continue to follow cardiothoracic surgery as well.\par \par \par Hiatal Hernia/ Intra thoracic stomach \par - Imaging noted 2/3 stomach intra thoracic hernia\par -Endoscopy done which was notable for large hiatal hernia.\par -Patient already established with cardiothoracic surgeon Dr. Jose Mccracken.\par \par Anemia\par -Slightly lower than baseline\par -Patient declining colonoscopy because of anticoagulation\par -We will plan capsule study while on anticoagulation\par -Patient notes to me that she eats a lot of beats she is to hold off on beets and red foods for a week prior

## 2022-02-15 ENCOUNTER — OUTPATIENT (OUTPATIENT)
Dept: OUTPATIENT SERVICES | Facility: HOSPITAL | Age: 72
LOS: 1 days | Discharge: HOME | End: 2022-02-15

## 2022-02-15 ENCOUNTER — APPOINTMENT (OUTPATIENT)
Dept: MEDICATION MANAGEMENT | Facility: CLINIC | Age: 72
End: 2022-02-15

## 2022-02-15 DIAGNOSIS — Z79.01 LONG TERM (CURRENT) USE OF ANTICOAGULANTS: ICD-10-CM

## 2022-02-15 DIAGNOSIS — I27.82 CHRONIC PULMONARY EMBOLISM: ICD-10-CM

## 2022-02-15 LAB
INR PPP: 2.9 RATIO
POCT-PROTHROMBIN TIME: 34.3 SECS
QUALITY CONTROL: YES

## 2022-03-02 ENCOUNTER — NON-APPOINTMENT (OUTPATIENT)
Age: 72
End: 2022-03-02

## 2022-03-15 ENCOUNTER — OUTPATIENT (OUTPATIENT)
Dept: OUTPATIENT SERVICES | Facility: HOSPITAL | Age: 72
LOS: 1 days | Discharge: HOME | End: 2022-03-15

## 2022-03-15 ENCOUNTER — APPOINTMENT (OUTPATIENT)
Dept: MEDICATION MANAGEMENT | Facility: CLINIC | Age: 72
End: 2022-03-15

## 2022-03-15 DIAGNOSIS — I27.82 CHRONIC PULMONARY EMBOLISM: ICD-10-CM

## 2022-03-15 DIAGNOSIS — Z79.01 LONG TERM (CURRENT) USE OF ANTICOAGULANTS: ICD-10-CM

## 2022-03-15 LAB
INR PPP: 2.4 RATIO
POCT-PROTHROMBIN TIME: 28.2 SECS
QUALITY CONTROL: YES

## 2022-03-16 ENCOUNTER — APPOINTMENT (OUTPATIENT)
Age: 72
End: 2022-03-16
Payer: MEDICARE

## 2022-03-16 VITALS
RESPIRATION RATE: 14 BRPM | BODY MASS INDEX: 35.32 KG/M2 | DIASTOLIC BLOOD PRESSURE: 62 MMHG | HEIGHT: 65 IN | HEART RATE: 94 BPM | OXYGEN SATURATION: 96 % | SYSTOLIC BLOOD PRESSURE: 136 MMHG | WEIGHT: 212 LBS

## 2022-03-16 PROCEDURE — 99214 OFFICE O/P EST MOD 30 MIN: CPT

## 2022-03-16 NOTE — ASSESSMENT
[FreeTextEntry1] : Mild persistent asthma poorly compensated secondary to compliance \par HO Unprovoked PE.  APL.  FOllowed by hem onc.  On Coumadin.\par Was being evaluated for HH repair by CTS.  Not interested in surgery at this time

## 2022-03-16 NOTE — HISTORY OF PRESENT ILLNESS
[Mild Persistent] : mild persistent [Doing Well] : doing well [Well Controlled] : Well controlled [Checks Regularly] : The patient checks ~his/her~ peak flow regularly [Good Control] : peak flow has been good [PFTs] : pulmonary function tests [Follow-Up - Routine Clinic] : a routine clinic follow-up of [None] : No associated symptoms are reported [Goals--Doing Well] : the patient is not doing well with ~his/her~ asthma goals

## 2022-03-29 ENCOUNTER — LABORATORY RESULT (OUTPATIENT)
Age: 72
End: 2022-03-29

## 2022-04-01 ENCOUNTER — TRANSCRIPTION ENCOUNTER (OUTPATIENT)
Age: 72
End: 2022-04-01

## 2022-04-01 ENCOUNTER — OUTPATIENT (OUTPATIENT)
Dept: OUTPATIENT SERVICES | Facility: HOSPITAL | Age: 72
LOS: 1 days | Discharge: HOME | End: 2022-04-01
Payer: MEDICARE

## 2022-04-01 VITALS
SYSTOLIC BLOOD PRESSURE: 138 MMHG | WEIGHT: 207.9 LBS | HEART RATE: 78 BPM | DIASTOLIC BLOOD PRESSURE: 63 MMHG | HEIGHT: 66 IN | RESPIRATION RATE: 20 BRPM | TEMPERATURE: 97 F

## 2022-04-01 DIAGNOSIS — K21.9 GASTRO-ESOPHAGEAL REFLUX DISEASE WITHOUT ESOPHAGITIS: ICD-10-CM

## 2022-04-01 DIAGNOSIS — D64.9 ANEMIA, UNSPECIFIED: ICD-10-CM

## 2022-04-01 DIAGNOSIS — K44.9 DIAPHRAGMATIC HERNIA WITHOUT OBSTRUCTION OR GANGRENE: ICD-10-CM

## 2022-04-01 PROCEDURE — 91110 GI TRC IMG INTRAL ESOPH-ILE: CPT | Mod: 26

## 2022-04-01 RX ORDER — WARFARIN SODIUM 2.5 MG/1
0 TABLET ORAL
Qty: 0 | Refills: 0 | DISCHARGE

## 2022-04-01 RX ORDER — FUROSEMIDE 40 MG
1 TABLET ORAL
Qty: 0 | Refills: 0 | DISCHARGE

## 2022-04-01 RX ORDER — ENOXAPARIN SODIUM 100 MG/ML
0 INJECTION SUBCUTANEOUS
Qty: 0 | Refills: 0 | DISCHARGE

## 2022-04-01 RX ORDER — LOSARTAN POTASSIUM 100 MG/1
1 TABLET, FILM COATED ORAL
Qty: 0 | Refills: 0 | DISCHARGE

## 2022-04-01 RX ORDER — MONTELUKAST 4 MG/1
1 TABLET, CHEWABLE ORAL
Qty: 0 | Refills: 0 | DISCHARGE

## 2022-04-01 RX ORDER — LEVOTHYROXINE SODIUM 125 MCG
1 TABLET ORAL
Qty: 0 | Refills: 0 | DISCHARGE

## 2022-04-01 RX ORDER — DILTIAZEM HCL 120 MG
1 CAPSULE, EXT RELEASE 24 HR ORAL
Qty: 0 | Refills: 0 | DISCHARGE

## 2022-04-01 NOTE — ASU PATIENT PROFILE, ADULT - NSICDXPASTMEDICALHX_GEN_ALL_CORE_FT
PAST MEDICAL HISTORY:  H/O antiphospholipid syndrome     H/O Hashimoto thyroiditis     Hypertension     Scoliosis     SOB (shortness of breath)

## 2022-04-01 NOTE — ASU DISCHARGE PLAN (ADULT/PEDIATRIC) - NS MD DC FALL RISK RISK
For information on Fall & Injury Prevention, visit: [email or web address] OR  [email or web address] OR  https://www.cdc.gov/steadi/patient.html

## 2022-04-01 NOTE — ASU PATIENT PROFILE, ADULT - FALL HARM RISK - RISK INTERVENTIONS

## 2022-04-13 ENCOUNTER — OUTPATIENT (OUTPATIENT)
Dept: OUTPATIENT SERVICES | Facility: HOSPITAL | Age: 72
LOS: 1 days | Discharge: HOME | End: 2022-04-13

## 2022-04-13 ENCOUNTER — APPOINTMENT (OUTPATIENT)
Dept: MEDICATION MANAGEMENT | Facility: CLINIC | Age: 72
End: 2022-04-13

## 2022-04-13 DIAGNOSIS — Z79.01 LONG TERM (CURRENT) USE OF ANTICOAGULANTS: ICD-10-CM

## 2022-04-13 DIAGNOSIS — I27.82 CHRONIC PULMONARY EMBOLISM: ICD-10-CM

## 2022-04-13 PROBLEM — Z86.2 PERSONAL HISTORY OF DISEASES OF THE BLOOD AND BLOOD-FORMING ORGANS AND CERTAIN DISORDERS INVOLVING THE IMMUNE MECHANISM: Chronic | Status: ACTIVE | Noted: 2022-04-01

## 2022-04-13 LAB
INR PPP: 2.7 RATIO
POCT-PROTHROMBIN TIME: 33 SECS
QUALITY CONTROL: YES

## 2022-05-12 ENCOUNTER — OUTPATIENT (OUTPATIENT)
Dept: OUTPATIENT SERVICES | Facility: HOSPITAL | Age: 72
LOS: 1 days | Discharge: HOME | End: 2022-05-12

## 2022-05-12 ENCOUNTER — APPOINTMENT (OUTPATIENT)
Dept: MEDICATION MANAGEMENT | Facility: CLINIC | Age: 72
End: 2022-05-12

## 2022-05-12 DIAGNOSIS — Z79.01 LONG TERM (CURRENT) USE OF ANTICOAGULANTS: ICD-10-CM

## 2022-05-12 DIAGNOSIS — I27.82 CHRONIC PULMONARY EMBOLISM: ICD-10-CM

## 2022-05-12 LAB
INR PPP: 2.9 RATIO
POCT-PROTHROMBIN TIME: 35 SECS
QUALITY CONTROL: YES

## 2022-05-18 ENCOUNTER — APPOINTMENT (OUTPATIENT)
Dept: GASTROENTEROLOGY | Facility: CLINIC | Age: 72
End: 2022-05-18
Payer: MEDICARE

## 2022-05-18 DIAGNOSIS — D64.9 ANEMIA, UNSPECIFIED: ICD-10-CM

## 2022-05-18 PROCEDURE — 99442: CPT | Mod: 95

## 2022-05-18 NOTE — HISTORY OF PRESENT ILLNESS
[Home] : at home, [unfilled] , at the time of the visit. [Medical Office: ([facility])___] : at the medical office located in  [Verbal consent obtained from patient] : the patient, [unfilled] [___ Month(s) Ago] : [unfilled] month(s) ago [Test: ___] : [unfilled] [_________] : Performed [unfilled] [FreeTextEntry4] : STELLA [de-identified] : 11/10/21 [de-identified] : Patient is a 72-year-old female with past medical history of hypertension, hyperlipidemia, Hashimoto's, antiphospholipid syndrome, and pulmonary embolism requiring ICU admission currently on Coumadin who was referred for evaluation of cough and to check for a hiatal hernia.  Patient had endoscopic evaluation which was notable for a large hiatal hernia which was also tortuous. Patient feels well and does not exhibit symptoms of symptomatic anemia including palpitations or shortness of breath. Patient was offered biopsy in the past colonoscopy which she declined. Patient also notes that after her last endoscopy the bridging process for her anticoagulation was very troublesome and she would decline doing this again if colonoscopy was suggested. Capsule was done which was without any active bleeding, or AVM.

## 2022-05-18 NOTE — ASSESSMENT
[FreeTextEntry1] : Patient is a 72-year-old female with past medical history of hypertension, hyperlipidemia, Hashimoto's, antiphospholipid syndrome, and pulmonary embolism requiring ICU admission currently on Coumadin who was referred for evaluation of cough and to check for a hiatal hernia.  Patient had endoscopic evaluation which was notable for a large hiatal hernia which was also tortuous. Patient feels well and does not exhibit symptoms of symptomatic anemia including palpitations or shortness of breath. Patient was offered biopsy in the past colonoscopy which she declined. Patient also notes that after her last endoscopy the bridging process for her anticoagulation was very troublesome and she would decline doing this again if colonoscopy was suggested. Capsule was done which was without any active bleeding, or AVM. \par \par \par Hiatal Hernia/ Intra thoracic stomach \par - Declined intervention with Dr. ama Mccracken\par \par Anemia\par -Capsule results normal \par \par Offered CRC but wants to hold off and think about it

## 2022-06-09 ENCOUNTER — APPOINTMENT (OUTPATIENT)
Dept: MEDICATION MANAGEMENT | Facility: CLINIC | Age: 72
End: 2022-06-09

## 2022-06-09 ENCOUNTER — OUTPATIENT (OUTPATIENT)
Dept: OUTPATIENT SERVICES | Facility: HOSPITAL | Age: 72
LOS: 1 days | Discharge: HOME | End: 2022-06-09

## 2022-06-09 DIAGNOSIS — Z79.01 LONG TERM (CURRENT) USE OF ANTICOAGULANTS: ICD-10-CM

## 2022-06-09 DIAGNOSIS — I27.82 CHRONIC PULMONARY EMBOLISM: ICD-10-CM

## 2022-06-22 ENCOUNTER — APPOINTMENT (OUTPATIENT)
Dept: MEDICATION MANAGEMENT | Facility: CLINIC | Age: 72
End: 2022-06-22

## 2022-06-22 ENCOUNTER — OUTPATIENT (OUTPATIENT)
Dept: OUTPATIENT SERVICES | Facility: HOSPITAL | Age: 72
LOS: 1 days | Discharge: HOME | End: 2022-06-22

## 2022-06-22 DIAGNOSIS — I27.82 CHRONIC PULMONARY EMBOLISM: ICD-10-CM

## 2022-06-22 DIAGNOSIS — Z79.01 LONG TERM (CURRENT) USE OF ANTICOAGULANTS: ICD-10-CM

## 2022-06-22 LAB
INR PPP: 2.8 RATIO
POCT-PROTHROMBIN TIME: 33 SECS
QUALITY CONTROL: YES

## 2022-06-25 ENCOUNTER — APPOINTMENT (OUTPATIENT)
Dept: ENDOCRINOLOGY | Facility: CLINIC | Age: 72
End: 2022-06-25
Payer: MEDICARE

## 2022-06-25 PROCEDURE — 99443: CPT | Mod: 95

## 2022-07-19 ENCOUNTER — RX RENEWAL (OUTPATIENT)
Age: 72
End: 2022-07-19

## 2022-07-19 RX ORDER — WARFARIN 5 MG/1
5 TABLET ORAL DAILY
Qty: 90 | Refills: 3 | Status: ACTIVE | COMMUNITY
Start: 2020-08-28 | End: 1900-01-01

## 2022-07-21 ENCOUNTER — OUTPATIENT (OUTPATIENT)
Dept: OUTPATIENT SERVICES | Facility: HOSPITAL | Age: 72
LOS: 1 days | Discharge: HOME | End: 2022-07-21

## 2022-07-21 ENCOUNTER — APPOINTMENT (OUTPATIENT)
Dept: MEDICATION MANAGEMENT | Facility: CLINIC | Age: 72
End: 2022-07-21

## 2022-07-21 DIAGNOSIS — I27.82 CHRONIC PULMONARY EMBOLISM: ICD-10-CM

## 2022-07-21 DIAGNOSIS — Z79.01 LONG TERM (CURRENT) USE OF ANTICOAGULANTS: ICD-10-CM

## 2022-07-21 DIAGNOSIS — Z79.01 ENCOUNTER FOR THERAPEUTIC DRUG LVL MONITORING: ICD-10-CM

## 2022-07-21 DIAGNOSIS — Z51.81 ENCOUNTER FOR THERAPEUTIC DRUG LVL MONITORING: ICD-10-CM

## 2022-07-21 LAB
INR PPP: 3.3 RATIO
POCT-PROTHROMBIN TIME: 39.1 SECS
QUALITY CONTROL: YES

## 2022-09-12 ENCOUNTER — APPOINTMENT (OUTPATIENT)
Age: 72
End: 2022-09-12

## 2022-10-31 ENCOUNTER — APPOINTMENT (OUTPATIENT)
Dept: MEDICATION MANAGEMENT | Facility: CLINIC | Age: 72
End: 2022-10-31

## 2022-10-31 ENCOUNTER — OUTPATIENT (OUTPATIENT)
Dept: OUTPATIENT SERVICES | Facility: HOSPITAL | Age: 72
LOS: 1 days | Discharge: HOME | End: 2022-10-31

## 2022-10-31 DIAGNOSIS — I27.82 CHRONIC PULMONARY EMBOLISM: ICD-10-CM

## 2022-10-31 DIAGNOSIS — Z79.01 LONG TERM (CURRENT) USE OF ANTICOAGULANTS: ICD-10-CM

## 2022-11-01 ENCOUNTER — APPOINTMENT (OUTPATIENT)
Dept: MEDICATION MANAGEMENT | Facility: CLINIC | Age: 72
End: 2022-11-01

## 2022-11-02 LAB
INR PPP: 2.8 RATIO
POCT-PROTHROMBIN TIME: 33.5 SECS
QUALITY CONTROL: YES

## 2022-11-15 ENCOUNTER — APPOINTMENT (OUTPATIENT)
Dept: MEDICATION MANAGEMENT | Facility: CLINIC | Age: 72
End: 2022-11-15

## 2022-11-29 ENCOUNTER — APPOINTMENT (OUTPATIENT)
Dept: MEDICATION MANAGEMENT | Facility: CLINIC | Age: 72
End: 2022-11-29

## 2022-11-29 ENCOUNTER — OUTPATIENT (OUTPATIENT)
Dept: OUTPATIENT SERVICES | Facility: HOSPITAL | Age: 72
LOS: 1 days | Discharge: HOME | End: 2022-11-29

## 2022-11-29 DIAGNOSIS — I27.82 CHRONIC PULMONARY EMBOLISM: ICD-10-CM

## 2022-11-29 DIAGNOSIS — Z79.01 LONG TERM (CURRENT) USE OF ANTICOAGULANTS: ICD-10-CM

## 2022-11-29 LAB
INR PPP: 2.5 RATIO
POCT-PROTHROMBIN TIME: 29.6 SECS
QUALITY CONTROL: YES

## 2022-12-01 ENCOUNTER — APPOINTMENT (OUTPATIENT)
Dept: ENDOCRINOLOGY | Facility: CLINIC | Age: 72
End: 2022-12-01

## 2022-12-01 VITALS
WEIGHT: 212 LBS | SYSTOLIC BLOOD PRESSURE: 130 MMHG | TEMPERATURE: 97.4 F | HEART RATE: 76 BPM | OXYGEN SATURATION: 97 % | BODY MASS INDEX: 35.32 KG/M2 | DIASTOLIC BLOOD PRESSURE: 76 MMHG | HEIGHT: 65 IN

## 2022-12-01 PROCEDURE — 99212 OFFICE O/P EST SF 10 MIN: CPT

## 2022-12-01 NOTE — ASSESSMENT
[FreeTextEntry1] : Labs reviewed from 11/28//2022.\par Pt. has multinodular goiter. We have reviewed ultrasounds ( 6/21/2022 c/w 6/6/2020)  and the discussed the potential for hyperfunction and need to monitor for changes that could be consistent with neoplasm. Currently stable nodules and none considered suspicious. Will continue to monitor U/S for stability and function.\par The patient has a history of hypothyroidism though currently is clinically euthyroid with no hypothyroid or hyperthyroid signs or symptoms. The patient's free T4 was mildly elevated at 1.78 and at TSH (0.104) was suppressed.Risks of subclinical hyperthyroidism (BMD,cardio etc.) and hypothyroidism ( fatigue , muscle aches, weight gain etc.)  discussed in detail and given clinical euthyroid state after discussion pt. is comfortable with current dose but suggested decrease to 0.137. \par \par Pt. has had a low Vit D which previously was   normal at 37.4 up from  23.8--> 28.3->37.9. .  The importance of normal value and need for Vit D supplements of 1000 IU daily was discussed.\par Lipid levels were reviewed with patient and importance and function of LDL, HDL and triglycerides discussed. Methods to increase HDL (exercise, fish, beans, oat meal, legumes etc.) discussed with pt. in conjunction with measures to decrease LDL and triglycerides  including diet and exercise.LDL/HDL was 11/57 from 154/56 . Current regimen of treatment to continue. Risks/benefits  of statins were discussed in detail. \par Pt. has Impaired fasting glucose ( 117-> 114 ) and  current HbA1c is 5.7 from 5.8 from 5.9\par We have discussed diet/exercise and risks related to diabetes in great detail.\par Covid antibody was +  \par Pt with anti-lipid abnormality ? acquired. Pt now seeing neurologist.

## 2022-12-01 NOTE — PHYSICAL EXAM
[Acanthosis Nigricans] : no acanthosis nigricans [de-identified] :  nodular thyroid disease. L nodule 3 cm

## 2023-01-05 ENCOUNTER — APPOINTMENT (OUTPATIENT)
Dept: MEDICATION MANAGEMENT | Facility: CLINIC | Age: 73
End: 2023-01-05

## 2023-01-05 ENCOUNTER — OUTPATIENT (OUTPATIENT)
Dept: OUTPATIENT SERVICES | Facility: HOSPITAL | Age: 73
LOS: 1 days | Discharge: HOME | End: 2023-01-05

## 2023-01-05 DIAGNOSIS — Z79.01 LONG TERM (CURRENT) USE OF ANTICOAGULANTS: ICD-10-CM

## 2023-01-05 DIAGNOSIS — I27.82 CHRONIC PULMONARY EMBOLISM: ICD-10-CM

## 2023-01-05 LAB
INR PPP: 2.9 RATIO
POCT-PROTHROMBIN TIME: 34.8 SECS
QUALITY CONTROL: YES

## 2023-02-03 ENCOUNTER — OUTPATIENT (OUTPATIENT)
Dept: OUTPATIENT SERVICES | Facility: HOSPITAL | Age: 73
LOS: 1 days | Discharge: HOME | End: 2023-02-03

## 2023-02-03 ENCOUNTER — APPOINTMENT (OUTPATIENT)
Dept: MEDICATION MANAGEMENT | Facility: CLINIC | Age: 73
End: 2023-02-03

## 2023-02-03 DIAGNOSIS — Z79.01 LONG TERM (CURRENT) USE OF ANTICOAGULANTS: ICD-10-CM

## 2023-02-03 DIAGNOSIS — I27.82 CHRONIC PULMONARY EMBOLISM: ICD-10-CM

## 2023-02-03 LAB
INR PPP: 2.9 RATIO
POCT-PROTHROMBIN TIME: 36.6 SECS
QUALITY CONTROL: YES

## 2023-03-03 ENCOUNTER — APPOINTMENT (OUTPATIENT)
Dept: MEDICATION MANAGEMENT | Facility: CLINIC | Age: 73
End: 2023-03-03

## 2023-03-03 ENCOUNTER — OUTPATIENT (OUTPATIENT)
Dept: OUTPATIENT SERVICES | Facility: HOSPITAL | Age: 73
LOS: 1 days | End: 2023-03-03
Payer: MEDICARE

## 2023-03-03 DIAGNOSIS — Z79.01 LONG TERM (CURRENT) USE OF ANTICOAGULANTS: ICD-10-CM

## 2023-03-03 DIAGNOSIS — I27.82 CHRONIC PULMONARY EMBOLISM: ICD-10-CM

## 2023-03-03 LAB
INR PPP: 2.7 RATIO
POCT-PROTHROMBIN TIME: 32.3 SECS
QUALITY CONTROL: YES

## 2023-03-03 PROCEDURE — 85610 PROTHROMBIN TIME: CPT

## 2023-03-03 PROCEDURE — 99211 OFF/OP EST MAY X REQ PHY/QHP: CPT

## 2023-03-03 PROCEDURE — 36416 COLLJ CAPILLARY BLOOD SPEC: CPT

## 2023-03-04 DIAGNOSIS — I27.82 CHRONIC PULMONARY EMBOLISM: ICD-10-CM

## 2023-03-04 DIAGNOSIS — Z79.01 LONG TERM (CURRENT) USE OF ANTICOAGULANTS: ICD-10-CM

## 2023-03-16 ENCOUNTER — OUTPATIENT (OUTPATIENT)
Dept: OUTPATIENT SERVICES | Facility: HOSPITAL | Age: 73
LOS: 1 days | End: 2023-03-16
Payer: MEDICARE

## 2023-03-16 ENCOUNTER — LABORATORY RESULT (OUTPATIENT)
Age: 73
End: 2023-03-16

## 2023-03-16 ENCOUNTER — APPOINTMENT (OUTPATIENT)
Dept: HEMATOLOGY ONCOLOGY | Facility: CLINIC | Age: 73
End: 2023-03-16
Payer: MEDICARE

## 2023-03-16 VITALS
HEIGHT: 65 IN | RESPIRATION RATE: 16 BRPM | BODY MASS INDEX: 35.32 KG/M2 | WEIGHT: 212 LBS | HEART RATE: 84 BPM | SYSTOLIC BLOOD PRESSURE: 150 MMHG | DIASTOLIC BLOOD PRESSURE: 74 MMHG | TEMPERATURE: 97.9 F

## 2023-03-16 DIAGNOSIS — I26.99 OTHER PULMONARY EMBOLISM WITHOUT ACUTE COR PULMONALE: ICD-10-CM

## 2023-03-16 LAB
HCT VFR BLD CALC: 38.5 %
HGB BLD-MCNC: 11.4 G/DL
MCHC RBC-ENTMCNC: 23.3 PG
MCHC RBC-ENTMCNC: 29.6 G/DL
MCV RBC AUTO: 78.6 FL
PLATELET # BLD AUTO: 293 K/UL
PMV BLD: 10.2 FL
RBC # BLD: 4.9 M/UL
RBC # FLD: 19.3 %
WBC # FLD AUTO: 6.37 K/UL

## 2023-03-16 PROCEDURE — 85610 PROTHROMBIN TIME: CPT

## 2023-03-16 PROCEDURE — 84439 ASSAY OF FREE THYROXINE: CPT

## 2023-03-16 PROCEDURE — 99213 OFFICE O/P EST LOW 20 MIN: CPT

## 2023-03-16 PROCEDURE — 36415 COLL VENOUS BLD VENIPUNCTURE: CPT

## 2023-03-16 PROCEDURE — 84443 ASSAY THYROID STIM HORMONE: CPT

## 2023-03-16 PROCEDURE — 80053 COMPREHEN METABOLIC PANEL: CPT

## 2023-03-16 PROCEDURE — 85027 COMPLETE CBC AUTOMATED: CPT

## 2023-03-17 DIAGNOSIS — I26.99 OTHER PULMONARY EMBOLISM WITHOUT ACUTE COR PULMONALE: ICD-10-CM

## 2023-03-17 LAB
ALBUMIN SERPL ELPH-MCNC: 4 G/DL
ALP BLD-CCNC: 101 U/L
ALT SERPL-CCNC: 14 U/L
ANION GAP SERPL CALC-SCNC: 13 MMOL/L
AST SERPL-CCNC: 18 U/L
BILIRUB SERPL-MCNC: 0.3 MG/DL
BUN SERPL-MCNC: 9 MG/DL
CALCIUM SERPL-MCNC: 9.5 MG/DL
CHLORIDE SERPL-SCNC: 103 MMOL/L
CO2 SERPL-SCNC: 25 MMOL/L
CREAT SERPL-MCNC: 0.7 MG/DL
EGFR: 93 ML/MIN/1.73M2
GLUCOSE SERPL-MCNC: 88 MG/DL
INR PPP: 2.18 RATIO
POTASSIUM SERPL-SCNC: 3.7 MMOL/L
PROT SERPL-MCNC: 6.5 G/DL
PT BLD: 25.4 SEC
SODIUM SERPL-SCNC: 141 MMOL/L
TSH SERPL-ACNC: 0.11 UIU/ML

## 2023-03-17 NOTE — PHYSICAL EXAM
[Ambulatory and capable of all self care but unable to carry out any work activities] : Status 2- Ambulatory and capable of all self care but unable to carry out any work activities. Up and about more than 50% of waking hours [Obese] : obese [Normal] : affect appropriate [de-identified] : Obese [de-identified] : Arthritic changes noted. [de-identified] : Using a walker for ambulation.

## 2023-03-17 NOTE — HISTORY OF PRESENT ILLNESS
[Disease:__________________________] : Disease: [unfilled] [de-identified] : The patient is coming for her regularly scheduled follow up for her thrombophilia.\par She is still on Coumadin because of her antiphospholipid syndrome.\par Her major issue continues to be her cough and her breathing. She stopped her Singulair and Symbicort as they seemed to exacerbate her cough and cause urinary retention.\par She is now taking a new formulation of her diltiazem.\par Her blood pressure is fluctuating. \par No recent bleeding or thrombotic events.\par She has not had a colonoscopy. She had EGD and colonoscopy which did not show evidence of bleeding. \par

## 2023-03-17 NOTE — ASSESSMENT
[FreeTextEntry1] : Antiphospholipid syndrome, on indefinite anticoagulation. Her work up was somewhat confusing in the sense that her PTT was elevated and not corrected with mixing studies but all the other tests were negative. \par In addition, she has aggravating factors such as her obesity and immobility as she walks with a walker. \par Given the above,  the patient should continue with anticoagulation since she is tolerating it well.\par \par Advised Cologuard if she continues to decline colonoscopy.\par \par All her questions answered.\par \par Follow up in 6 months.

## 2023-03-17 NOTE — REVIEW OF SYSTEMS
[Fatigue] : fatigue [Recent Change In Weight] : ~T recent weight change [Cough] : cough [Joint Pain] : joint pain [Depression] : depression [Negative] : Integumentary [FreeTextEntry2] : Gained weight

## 2023-03-24 DIAGNOSIS — D68.59 OTHER PRIMARY THROMBOPHILIA: ICD-10-CM

## 2023-04-14 ENCOUNTER — OUTPATIENT (OUTPATIENT)
Dept: OUTPATIENT SERVICES | Facility: HOSPITAL | Age: 73
LOS: 1 days | End: 2023-04-14
Payer: MEDICARE

## 2023-04-14 ENCOUNTER — APPOINTMENT (OUTPATIENT)
Dept: MEDICATION MANAGEMENT | Facility: CLINIC | Age: 73
End: 2023-04-14

## 2023-04-14 DIAGNOSIS — I27.82 CHRONIC PULMONARY EMBOLISM: ICD-10-CM

## 2023-04-14 DIAGNOSIS — Z79.01 LONG TERM (CURRENT) USE OF ANTICOAGULANTS: ICD-10-CM

## 2023-04-14 LAB
INR PPP: 3.1 RATIO
POCT-PROTHROMBIN TIME: 37.3 SECS
QUALITY CONTROL: YES

## 2023-04-14 PROCEDURE — 99211 OFF/OP EST MAY X REQ PHY/QHP: CPT

## 2023-04-14 PROCEDURE — 85610 PROTHROMBIN TIME: CPT

## 2023-04-14 PROCEDURE — 36416 COLLJ CAPILLARY BLOOD SPEC: CPT

## 2023-04-15 DIAGNOSIS — I27.82 CHRONIC PULMONARY EMBOLISM: ICD-10-CM

## 2023-04-15 DIAGNOSIS — Z79.01 LONG TERM (CURRENT) USE OF ANTICOAGULANTS: ICD-10-CM

## 2023-04-27 ENCOUNTER — APPOINTMENT (OUTPATIENT)
Dept: PULMONOLOGY | Facility: CLINIC | Age: 73
End: 2023-04-27
Payer: MEDICARE

## 2023-04-27 VITALS
RESPIRATION RATE: 14 BRPM | HEART RATE: 88 BPM | SYSTOLIC BLOOD PRESSURE: 138 MMHG | WEIGHT: 217 LBS | BODY MASS INDEX: 36.15 KG/M2 | OXYGEN SATURATION: 96 % | DIASTOLIC BLOOD PRESSURE: 84 MMHG | HEIGHT: 65 IN

## 2023-04-27 DIAGNOSIS — J45.909 UNSPECIFIED ASTHMA, UNCOMPLICATED: ICD-10-CM

## 2023-04-27 PROCEDURE — 71046 X-RAY EXAM CHEST 2 VIEWS: CPT

## 2023-04-27 PROCEDURE — 99214 OFFICE O/P EST MOD 30 MIN: CPT | Mod: 25

## 2023-04-27 RX ORDER — ALBUTEROL SULFATE 90 UG/1
108 (90 BASE) AEROSOL, METERED RESPIRATORY (INHALATION)
Qty: 1 | Refills: 3 | Status: ACTIVE | COMMUNITY
Start: 2023-04-27 | End: 1900-01-01

## 2023-04-27 NOTE — PROCEDURE
[FreeTextEntry1] : CXR PA and Lateral \par The costophrenic and cardiophrenic angles are sharp\par The richie parenchyma shows no infiltrates, consolidations, or nodules \par The Mediastinum is within normal limits\par No pleural effusions\par

## 2023-05-12 ENCOUNTER — OUTPATIENT (OUTPATIENT)
Dept: OUTPATIENT SERVICES | Facility: HOSPITAL | Age: 73
LOS: 1 days | End: 2023-05-12
Payer: MEDICARE

## 2023-05-12 ENCOUNTER — APPOINTMENT (OUTPATIENT)
Dept: MEDICATION MANAGEMENT | Facility: CLINIC | Age: 73
End: 2023-05-12

## 2023-05-12 DIAGNOSIS — Z79.01 LONG TERM (CURRENT) USE OF ANTICOAGULANTS: ICD-10-CM

## 2023-05-12 DIAGNOSIS — I27.82 CHRONIC PULMONARY EMBOLISM: ICD-10-CM

## 2023-05-12 LAB
INR PPP: 2.7 RATIO
POCT-PROTHROMBIN TIME: 32.5 SECS
QUALITY CONTROL: YES

## 2023-05-12 PROCEDURE — 99211 OFF/OP EST MAY X REQ PHY/QHP: CPT

## 2023-05-12 PROCEDURE — 85610 PROTHROMBIN TIME: CPT

## 2023-05-12 PROCEDURE — 36416 COLLJ CAPILLARY BLOOD SPEC: CPT

## 2023-05-13 DIAGNOSIS — I27.82 CHRONIC PULMONARY EMBOLISM: ICD-10-CM

## 2023-05-13 DIAGNOSIS — Z79.01 LONG TERM (CURRENT) USE OF ANTICOAGULANTS: ICD-10-CM

## 2023-06-09 ENCOUNTER — APPOINTMENT (OUTPATIENT)
Dept: MEDICATION MANAGEMENT | Facility: CLINIC | Age: 73
End: 2023-06-09

## 2023-06-09 ENCOUNTER — OUTPATIENT (OUTPATIENT)
Dept: OUTPATIENT SERVICES | Facility: HOSPITAL | Age: 73
LOS: 1 days | End: 2023-06-09
Payer: MEDICARE

## 2023-06-09 DIAGNOSIS — Z79.01 LONG TERM (CURRENT) USE OF ANTICOAGULANTS: ICD-10-CM

## 2023-06-09 DIAGNOSIS — I27.82 CHRONIC PULMONARY EMBOLISM: ICD-10-CM

## 2023-06-09 LAB
INR PPP: 3.4 RATIO
POCT-PROTHROMBIN TIME: 41.3 SECS
QUALITY CONTROL: YES

## 2023-06-09 PROCEDURE — 36416 COLLJ CAPILLARY BLOOD SPEC: CPT

## 2023-06-09 PROCEDURE — 99211 OFF/OP EST MAY X REQ PHY/QHP: CPT

## 2023-06-09 PROCEDURE — 85610 PROTHROMBIN TIME: CPT

## 2023-06-10 ENCOUNTER — APPOINTMENT (OUTPATIENT)
Dept: ENDOCRINOLOGY | Facility: CLINIC | Age: 73
End: 2023-06-10
Payer: MEDICARE

## 2023-06-10 ENCOUNTER — APPOINTMENT (OUTPATIENT)
Dept: ENDOCRINOLOGY | Facility: CLINIC | Age: 73
End: 2023-06-10

## 2023-06-10 VITALS
TEMPERATURE: 97.8 F | HEIGHT: 65 IN | BODY MASS INDEX: 35.99 KG/M2 | WEIGHT: 216 LBS | SYSTOLIC BLOOD PRESSURE: 160 MMHG | DIASTOLIC BLOOD PRESSURE: 82 MMHG | HEART RATE: 85 BPM | OXYGEN SATURATION: 98 %

## 2023-06-10 DIAGNOSIS — I27.82 CHRONIC PULMONARY EMBOLISM: ICD-10-CM

## 2023-06-10 DIAGNOSIS — Z79.01 LONG TERM (CURRENT) USE OF ANTICOAGULANTS: ICD-10-CM

## 2023-06-10 PROCEDURE — 99213 OFFICE O/P EST LOW 20 MIN: CPT

## 2023-06-10 RX ORDER — MONTELUKAST 10 MG/1
10 TABLET, FILM COATED ORAL
Qty: 90 | Refills: 3 | Status: DISCONTINUED | COMMUNITY
Start: 2021-12-14 | End: 2023-06-10

## 2023-06-10 NOTE — ASSESSMENT
[FreeTextEntry1] : Labs reviewed from 3/16/2023\par Heme and pulmonary visits noted. Last note read and appreciated and discussed with the patient. \par Pt. has multinodular goiter. We have reviewed ultrasounds ( 6/21/2022 c/w 6/6/2020)  and the discussed the potential for hyperfunction and need to monitor for changes that could be consistent with neoplasm. Currently stable nodules and none considered suspicious. Will continue to monitor U/S for stability and function.\par The patient has a history of hypothyroidism though currently is clinically euthyroid with no hypothyroid or hyperthyroid signs or symptoms. The patient's free T4 was elevated at 2.0  and at TSH (0.11) was suppressed.Risks of subclinical hyperthyroidism (BMD,cardio etc.) and hypothyroidism ( fatigue , muscle aches, weight gain etc.)  discussed in detail and given clinical euthyroid state after discussion pt. is comfortable with current dose but suggested decrease to  125 mcg levothyroxine \par \par Pt. has had a low Vit D which previously was   normal at 37.4 up from  23.8--> 28.3->37.9. .  The importance of normal value and need for Vit D supplements of 1000 IU daily was discussed.\par Lipid levels were reviewed with patient and importance and function of LDL, HDL and triglycerides discussed. Methods to increase HDL (exercise, fish, beans, oat meal, legumes etc.) discussed with pt. in conjunction with measures to decrease LDL and triglycerides  including diet and exercise.the previous LDL/HDL was  160/60\par Current regimen of treatment to continue. Risks/benefits  of statins were discussed in detail. \par Pt. has Impaired fasting glucose ( 117-> 114-> 110  ) and  previous HbA1c is 5.7 from 5.8 from 5.9\par We have discussed diet/exercise and risks related to diabetes in great detail.\par Covid antibody was +  \par Pt with anti-lipid abnormality ? acquired. Pt now seeing neurologist.

## 2023-06-10 NOTE — PHYSICAL EXAM
[Alert] : alert [Well Nourished] : well nourished [No Acute Distress] : no acute distress [Well Developed] : well developed [Normal Sclera/Conjunctiva] : normal sclera/conjunctiva [EOMI] : extra ocular movement intact [No Proptosis] : no proptosis [Normal Oropharynx] : the oropharynx was normal [No Respiratory Distress] : no respiratory distress [No Accessory Muscle Use] : no accessory muscle use [Clear to Auscultation] : lungs were clear to auscultation bilaterally [Normal S1, S2] : normal S1 and S2 [Normal Rate] : heart rate was normal [Regular Rhythm] : with a regular rhythm [No Edema] : no peripheral edema [Pedal Pulses Normal] : the pedal pulses are present [Normal Bowel Sounds] : normal bowel sounds [Not Tender] : non-tender [Not Distended] : not distended [Soft] : abdomen soft [Normal Anterior Cervical Nodes] : no anterior cervical lymphadenopathy [No Spinal Tenderness] : no spinal tenderness [Spine Straight] : spine straight [No Stigmata of Cushings Syndrome] : no stigmata of Cushings Syndrome [Normal Gait] : normal gait [Normal Strength/Tone] : muscle strength and tone were normal [No Rash] : no rash [Normal Reflexes] : deep tendon reflexes were 2+ and symmetric [No Tremors] : no tremors [Oriented x3] : oriented to person, place, and time [Acanthosis Nigricans] : no acanthosis nigricans [de-identified] :  nodular thyroid disease. L nodule 3 cm

## 2023-06-10 NOTE — PHYSICAL EXAM
[Alert] : alert [Well Nourished] : well nourished [No Acute Distress] : no acute distress [Well Developed] : well developed [Normal Sclera/Conjunctiva] : normal sclera/conjunctiva [EOMI] : extra ocular movement intact [No Proptosis] : no proptosis [Normal Oropharynx] : the oropharynx was normal [No Respiratory Distress] : no respiratory distress [No Accessory Muscle Use] : no accessory muscle use [Clear to Auscultation] : lungs were clear to auscultation bilaterally [Normal S1, S2] : normal S1 and S2 [Normal Rate] : heart rate was normal [Regular Rhythm] : with a regular rhythm [No Edema] : no peripheral edema [Pedal Pulses Normal] : the pedal pulses are present [Normal Bowel Sounds] : normal bowel sounds [Not Tender] : non-tender [Not Distended] : not distended [Soft] : abdomen soft [Normal Anterior Cervical Nodes] : no anterior cervical lymphadenopathy [No Spinal Tenderness] : no spinal tenderness [Spine Straight] : spine straight [No Stigmata of Cushings Syndrome] : no stigmata of Cushings Syndrome [Normal Gait] : normal gait [Normal Strength/Tone] : muscle strength and tone were normal [No Rash] : no rash [Normal Reflexes] : deep tendon reflexes were 2+ and symmetric [No Tremors] : no tremors [Oriented x3] : oriented to person, place, and time [Acanthosis Nigricans] : no acanthosis nigricans [de-identified] :  nodular thyroid disease. L nodule 3 cm

## 2023-06-10 NOTE — REASON FOR VISIT
[Acute] : an acute visit [Hypothyroidism] : hypothyroidism [Thyroid nodule/ MNG] : thyroid nodule/ MNG

## 2023-06-30 ENCOUNTER — APPOINTMENT (OUTPATIENT)
Dept: CARDIOLOGY | Facility: CLINIC | Age: 73
End: 2023-06-30

## 2023-07-14 ENCOUNTER — OUTPATIENT (OUTPATIENT)
Dept: OUTPATIENT SERVICES | Facility: HOSPITAL | Age: 73
LOS: 1 days | End: 2023-07-14
Payer: MEDICARE

## 2023-07-14 ENCOUNTER — APPOINTMENT (OUTPATIENT)
Dept: MEDICATION MANAGEMENT | Facility: CLINIC | Age: 73
End: 2023-07-14

## 2023-07-14 DIAGNOSIS — I27.82 CHRONIC PULMONARY EMBOLISM: ICD-10-CM

## 2023-07-14 DIAGNOSIS — Z79.01 LONG TERM (CURRENT) USE OF ANTICOAGULANTS: ICD-10-CM

## 2023-07-14 LAB
INR PPP: 3.1 RATIO
POCT-PROTHROMBIN TIME: 37.5 SECS
QUALITY CONTROL: YES

## 2023-07-14 PROCEDURE — 85610 PROTHROMBIN TIME: CPT

## 2023-07-14 PROCEDURE — 99211 OFF/OP EST MAY X REQ PHY/QHP: CPT

## 2023-07-14 PROCEDURE — 36416 COLLJ CAPILLARY BLOOD SPEC: CPT

## 2023-07-15 DIAGNOSIS — Z79.01 LONG TERM (CURRENT) USE OF ANTICOAGULANTS: ICD-10-CM

## 2023-07-15 DIAGNOSIS — I27.82 CHRONIC PULMONARY EMBOLISM: ICD-10-CM

## 2023-07-28 ENCOUNTER — APPOINTMENT (OUTPATIENT)
Dept: MEDICATION MANAGEMENT | Facility: CLINIC | Age: 73
End: 2023-07-28

## 2023-07-28 ENCOUNTER — OUTPATIENT (OUTPATIENT)
Dept: OUTPATIENT SERVICES | Facility: HOSPITAL | Age: 73
LOS: 1 days | End: 2023-07-28
Payer: MEDICARE

## 2023-07-28 DIAGNOSIS — I27.82 CHRONIC PULMONARY EMBOLISM: ICD-10-CM

## 2023-07-28 DIAGNOSIS — Z79.01 LONG TERM (CURRENT) USE OF ANTICOAGULANTS: ICD-10-CM

## 2023-07-28 LAB
INR PPP: 4.2 RATIO
POCT-PROTHROMBIN TIME: 49.9 SECS
QUALITY CONTROL: YES

## 2023-07-28 PROCEDURE — 99211 OFF/OP EST MAY X REQ PHY/QHP: CPT

## 2023-07-28 PROCEDURE — 36416 COLLJ CAPILLARY BLOOD SPEC: CPT

## 2023-07-28 PROCEDURE — 85610 PROTHROMBIN TIME: CPT

## 2023-07-29 DIAGNOSIS — I27.82 CHRONIC PULMONARY EMBOLISM: ICD-10-CM

## 2023-07-29 DIAGNOSIS — Z79.01 LONG TERM (CURRENT) USE OF ANTICOAGULANTS: ICD-10-CM

## 2023-11-02 ENCOUNTER — APPOINTMENT (OUTPATIENT)
Dept: PULMONOLOGY | Facility: CLINIC | Age: 73
End: 2023-11-02

## 2023-11-03 ENCOUNTER — OUTPATIENT (OUTPATIENT)
Dept: OUTPATIENT SERVICES | Facility: HOSPITAL | Age: 73
LOS: 1 days | End: 2023-11-03
Payer: MEDICARE

## 2023-11-03 ENCOUNTER — APPOINTMENT (OUTPATIENT)
Dept: MEDICATION MANAGEMENT | Facility: CLINIC | Age: 73
End: 2023-11-03

## 2023-11-03 DIAGNOSIS — I27.82 CHRONIC PULMONARY EMBOLISM: ICD-10-CM

## 2023-11-03 DIAGNOSIS — Z79.01 LONG TERM (CURRENT) USE OF ANTICOAGULANTS: ICD-10-CM

## 2023-11-03 LAB
INR PPP: 5.7 RATIO
POCT-PROTHROMBIN TIME: 68.1 SECS
QUALITY CONTROL: YES

## 2023-11-03 PROCEDURE — 99211 OFF/OP EST MAY X REQ PHY/QHP: CPT

## 2023-11-03 PROCEDURE — 85610 PROTHROMBIN TIME: CPT

## 2023-11-03 PROCEDURE — 36416 COLLJ CAPILLARY BLOOD SPEC: CPT

## 2023-11-04 DIAGNOSIS — I27.82 CHRONIC PULMONARY EMBOLISM: ICD-10-CM

## 2023-11-04 DIAGNOSIS — Z79.01 LONG TERM (CURRENT) USE OF ANTICOAGULANTS: ICD-10-CM

## 2023-11-10 ENCOUNTER — OUTPATIENT (OUTPATIENT)
Dept: OUTPATIENT SERVICES | Facility: HOSPITAL | Age: 73
LOS: 1 days | End: 2023-11-10
Payer: MEDICARE

## 2023-11-10 ENCOUNTER — APPOINTMENT (OUTPATIENT)
Dept: MEDICATION MANAGEMENT | Facility: CLINIC | Age: 73
End: 2023-11-10

## 2023-11-10 DIAGNOSIS — I27.82 CHRONIC PULMONARY EMBOLISM: ICD-10-CM

## 2023-11-10 DIAGNOSIS — Z79.01 LONG TERM (CURRENT) USE OF ANTICOAGULANTS: ICD-10-CM

## 2023-11-10 LAB
INR PPP: 2.3 RATIO
POCT-PROTHROMBIN TIME: 28 SECS
QUALITY CONTROL: YES

## 2023-11-10 PROCEDURE — 85610 PROTHROMBIN TIME: CPT

## 2023-11-10 PROCEDURE — 36416 COLLJ CAPILLARY BLOOD SPEC: CPT

## 2023-11-10 PROCEDURE — 99211 OFF/OP EST MAY X REQ PHY/QHP: CPT

## 2023-11-11 DIAGNOSIS — I27.82 CHRONIC PULMONARY EMBOLISM: ICD-10-CM

## 2023-11-11 DIAGNOSIS — Z79.01 LONG TERM (CURRENT) USE OF ANTICOAGULANTS: ICD-10-CM

## 2023-11-15 ENCOUNTER — APPOINTMENT (OUTPATIENT)
Dept: PULMONOLOGY | Facility: HOSPITAL | Age: 73
End: 2023-11-15

## 2023-11-28 ENCOUNTER — OUTPATIENT (OUTPATIENT)
Dept: OUTPATIENT SERVICES | Facility: HOSPITAL | Age: 73
LOS: 1 days | End: 2023-11-28
Payer: MEDICARE

## 2023-11-28 ENCOUNTER — APPOINTMENT (OUTPATIENT)
Dept: MEDICATION MANAGEMENT | Facility: CLINIC | Age: 73
End: 2023-11-28

## 2023-11-28 DIAGNOSIS — Z79.01 LONG TERM (CURRENT) USE OF ANTICOAGULANTS: ICD-10-CM

## 2023-11-28 DIAGNOSIS — I27.82 CHRONIC PULMONARY EMBOLISM: ICD-10-CM

## 2023-11-28 LAB
INR PPP: 4.3 RATIO
POCT-PROTHROMBIN TIME: 51.2 SECS
QUALITY CONTROL: YES

## 2023-11-28 PROCEDURE — 99211 OFF/OP EST MAY X REQ PHY/QHP: CPT

## 2023-11-28 PROCEDURE — 36416 COLLJ CAPILLARY BLOOD SPEC: CPT

## 2023-11-28 PROCEDURE — 85610 PROTHROMBIN TIME: CPT

## 2023-11-29 DIAGNOSIS — I27.82 CHRONIC PULMONARY EMBOLISM: ICD-10-CM

## 2023-11-29 DIAGNOSIS — Z79.01 LONG TERM (CURRENT) USE OF ANTICOAGULANTS: ICD-10-CM

## 2023-12-12 ENCOUNTER — APPOINTMENT (OUTPATIENT)
Dept: MEDICATION MANAGEMENT | Facility: CLINIC | Age: 73
End: 2023-12-12

## 2023-12-12 ENCOUNTER — OUTPATIENT (OUTPATIENT)
Dept: OUTPATIENT SERVICES | Facility: HOSPITAL | Age: 73
LOS: 1 days | End: 2023-12-12
Payer: MEDICARE

## 2023-12-12 DIAGNOSIS — I27.82 CHRONIC PULMONARY EMBOLISM: ICD-10-CM

## 2023-12-12 DIAGNOSIS — Z79.01 LONG TERM (CURRENT) USE OF ANTICOAGULANTS: ICD-10-CM

## 2023-12-12 LAB
INR PPP: 4.2 RATIO
POCT-PROTHROMBIN TIME: 49.9 SECS
QUALITY CONTROL: YES

## 2023-12-12 PROCEDURE — 36416 COLLJ CAPILLARY BLOOD SPEC: CPT

## 2023-12-12 PROCEDURE — 85610 PROTHROMBIN TIME: CPT

## 2023-12-12 PROCEDURE — 99211 OFF/OP EST MAY X REQ PHY/QHP: CPT

## 2023-12-13 DIAGNOSIS — Z79.01 LONG TERM (CURRENT) USE OF ANTICOAGULANTS: ICD-10-CM

## 2023-12-13 DIAGNOSIS — I27.82 CHRONIC PULMONARY EMBOLISM: ICD-10-CM

## 2023-12-26 ENCOUNTER — OUTPATIENT (OUTPATIENT)
Dept: OUTPATIENT SERVICES | Facility: HOSPITAL | Age: 73
LOS: 1 days | End: 2023-12-26
Payer: MEDICARE

## 2023-12-26 ENCOUNTER — APPOINTMENT (OUTPATIENT)
Dept: MEDICATION MANAGEMENT | Facility: CLINIC | Age: 73
End: 2023-12-26

## 2023-12-26 DIAGNOSIS — I27.82 CHRONIC PULMONARY EMBOLISM: ICD-10-CM

## 2023-12-26 DIAGNOSIS — Z79.01 LONG TERM (CURRENT) USE OF ANTICOAGULANTS: ICD-10-CM

## 2023-12-26 LAB
INR PPP: 3.2 RATIO
POCT-PROTHROMBIN TIME: 37.8 SECS
QUALITY CONTROL: YES

## 2023-12-26 PROCEDURE — 99211 OFF/OP EST MAY X REQ PHY/QHP: CPT

## 2023-12-26 PROCEDURE — 85610 PROTHROMBIN TIME: CPT

## 2023-12-26 PROCEDURE — 36416 COLLJ CAPILLARY BLOOD SPEC: CPT

## 2023-12-27 DIAGNOSIS — Z79.01 LONG TERM (CURRENT) USE OF ANTICOAGULANTS: ICD-10-CM

## 2023-12-27 DIAGNOSIS — I27.82 CHRONIC PULMONARY EMBOLISM: ICD-10-CM

## 2023-12-27 NOTE — ED ADULT NURSE NOTE - CAS DISCH ACCOMP BY
Patient arrived from OR intubated with size 7.0 ETT placed 23@lips.  Patient ETT is intact, patent, and secured with commercial tube brunson.  Placed patient on MV set on documented settings.  Will continue to monitor.   
01-Jan-2024
Self

## 2024-01-12 ENCOUNTER — APPOINTMENT (OUTPATIENT)
Dept: MEDICATION MANAGEMENT | Facility: CLINIC | Age: 74
End: 2024-01-12

## 2024-01-12 ENCOUNTER — OUTPATIENT (OUTPATIENT)
Dept: OUTPATIENT SERVICES | Facility: HOSPITAL | Age: 74
LOS: 1 days | End: 2024-01-12
Payer: MEDICARE

## 2024-01-12 DIAGNOSIS — Z79.01 LONG TERM (CURRENT) USE OF ANTICOAGULANTS: ICD-10-CM

## 2024-01-12 DIAGNOSIS — I27.82 CHRONIC PULMONARY EMBOLISM: ICD-10-CM

## 2024-01-12 LAB
INR PPP: 2.9 RATIO
POCT-PROTHROMBIN TIME: 34.3 SECS
QUALITY CONTROL: YES

## 2024-01-12 PROCEDURE — 85610 PROTHROMBIN TIME: CPT

## 2024-01-12 PROCEDURE — 99211 OFF/OP EST MAY X REQ PHY/QHP: CPT

## 2024-01-12 PROCEDURE — 36416 COLLJ CAPILLARY BLOOD SPEC: CPT

## 2024-01-13 DIAGNOSIS — I27.82 CHRONIC PULMONARY EMBOLISM: ICD-10-CM

## 2024-01-13 DIAGNOSIS — Z79.01 LONG TERM (CURRENT) USE OF ANTICOAGULANTS: ICD-10-CM

## 2024-01-27 ENCOUNTER — APPOINTMENT (OUTPATIENT)
Dept: ENDOCRINOLOGY | Facility: CLINIC | Age: 74
End: 2024-01-27
Payer: MEDICARE

## 2024-01-27 VITALS
HEIGHT: 65 IN | DIASTOLIC BLOOD PRESSURE: 82 MMHG | BODY MASS INDEX: 35.82 KG/M2 | WEIGHT: 215 LBS | SYSTOLIC BLOOD PRESSURE: 148 MMHG | HEART RATE: 86 BPM

## 2024-01-27 PROCEDURE — 99213 OFFICE O/P EST LOW 20 MIN: CPT

## 2024-01-27 RX ORDER — LOSARTAN POTASSIUM 100 MG/1
100 TABLET, FILM COATED ORAL
Qty: 90 | Refills: 3 | Status: ACTIVE | COMMUNITY
Start: 2021-11-15 | End: 1900-01-01

## 2024-02-01 ENCOUNTER — OUTPATIENT (OUTPATIENT)
Dept: OUTPATIENT SERVICES | Facility: HOSPITAL | Age: 74
LOS: 1 days | End: 2024-02-01
Payer: MEDICARE

## 2024-02-01 ENCOUNTER — APPOINTMENT (OUTPATIENT)
Dept: MEDICATION MANAGEMENT | Facility: CLINIC | Age: 74
End: 2024-02-01

## 2024-02-01 DIAGNOSIS — Z79.01 LONG TERM (CURRENT) USE OF ANTICOAGULANTS: ICD-10-CM

## 2024-02-01 DIAGNOSIS — I82.91 CHRONIC EMBOLISM AND THROMBOSIS OF UNSPECIFIED VEIN: ICD-10-CM

## 2024-02-01 DIAGNOSIS — I27.82 CHRONIC PULMONARY EMBOLISM: ICD-10-CM

## 2024-02-01 LAB
INR PPP: 2.8 RATIO
POCT-PROTHROMBIN TIME: 33.2 SECS
QUALITY CONTROL: YES

## 2024-02-01 PROCEDURE — 85610 PROTHROMBIN TIME: CPT

## 2024-02-01 PROCEDURE — 36416 COLLJ CAPILLARY BLOOD SPEC: CPT

## 2024-02-01 PROCEDURE — 99211 OFF/OP EST MAY X REQ PHY/QHP: CPT

## 2024-02-01 RX ORDER — WARFARIN 5 MG/1
5 TABLET ORAL DAILY
Qty: 90 | Refills: 3 | Status: ACTIVE | COMMUNITY
Start: 2024-02-01 | End: 1900-01-01

## 2024-02-02 DIAGNOSIS — I82.91 CHRONIC EMBOLISM AND THROMBOSIS OF UNSPECIFIED VEIN: ICD-10-CM

## 2024-02-02 DIAGNOSIS — Z79.01 LONG TERM (CURRENT) USE OF ANTICOAGULANTS: ICD-10-CM

## 2024-02-23 ENCOUNTER — APPOINTMENT (OUTPATIENT)
Dept: MEDICATION MANAGEMENT | Facility: CLINIC | Age: 74
End: 2024-02-23

## 2024-02-23 ENCOUNTER — OUTPATIENT (OUTPATIENT)
Dept: OUTPATIENT SERVICES | Facility: HOSPITAL | Age: 74
LOS: 1 days | End: 2024-02-23
Payer: MEDICARE

## 2024-02-23 DIAGNOSIS — Z79.01 LONG TERM (CURRENT) USE OF ANTICOAGULANTS: ICD-10-CM

## 2024-02-23 DIAGNOSIS — I27.82 CHRONIC PULMONARY EMBOLISM: ICD-10-CM

## 2024-02-23 LAB
INR PPP: 2.9 RATIO
POCT-PROTHROMBIN TIME: 35.4 SECS
QUALITY CONTROL: YES

## 2024-02-23 PROCEDURE — 85610 PROTHROMBIN TIME: CPT

## 2024-02-23 PROCEDURE — 99211 OFF/OP EST MAY X REQ PHY/QHP: CPT

## 2024-02-23 PROCEDURE — 36416 COLLJ CAPILLARY BLOOD SPEC: CPT

## 2024-02-24 DIAGNOSIS — Z79.01 LONG TERM (CURRENT) USE OF ANTICOAGULANTS: ICD-10-CM

## 2024-02-24 DIAGNOSIS — I27.82 CHRONIC PULMONARY EMBOLISM: ICD-10-CM

## 2024-03-15 ENCOUNTER — APPOINTMENT (OUTPATIENT)
Dept: MEDICATION MANAGEMENT | Facility: CLINIC | Age: 74
End: 2024-03-15

## 2024-03-15 ENCOUNTER — OUTPATIENT (OUTPATIENT)
Dept: OUTPATIENT SERVICES | Facility: HOSPITAL | Age: 74
LOS: 1 days | End: 2024-03-15
Payer: MEDICARE

## 2024-03-15 DIAGNOSIS — Z79.01 LONG TERM (CURRENT) USE OF ANTICOAGULANTS: ICD-10-CM

## 2024-03-15 DIAGNOSIS — I27.82 CHRONIC PULMONARY EMBOLISM: ICD-10-CM

## 2024-03-15 LAB
INR PPP: 4.4 RATIO
POCT-PROTHROMBIN TIME: 52.3 SECS
QUALITY CONTROL: YES

## 2024-03-15 PROCEDURE — 36416 COLLJ CAPILLARY BLOOD SPEC: CPT

## 2024-03-15 PROCEDURE — 99211 OFF/OP EST MAY X REQ PHY/QHP: CPT

## 2024-03-15 PROCEDURE — 85610 PROTHROMBIN TIME: CPT

## 2024-03-16 DIAGNOSIS — I27.82 CHRONIC PULMONARY EMBOLISM: ICD-10-CM

## 2024-03-16 DIAGNOSIS — Z79.01 LONG TERM (CURRENT) USE OF ANTICOAGULANTS: ICD-10-CM

## 2024-03-29 ENCOUNTER — APPOINTMENT (OUTPATIENT)
Dept: MEDICATION MANAGEMENT | Facility: CLINIC | Age: 74
End: 2024-03-29

## 2024-03-29 ENCOUNTER — OUTPATIENT (OUTPATIENT)
Dept: OUTPATIENT SERVICES | Facility: HOSPITAL | Age: 74
LOS: 1 days | End: 2024-03-29
Payer: MEDICARE

## 2024-03-29 DIAGNOSIS — I27.82 CHRONIC PULMONARY EMBOLISM: ICD-10-CM

## 2024-03-29 DIAGNOSIS — Z79.01 LONG TERM (CURRENT) USE OF ANTICOAGULANTS: ICD-10-CM

## 2024-03-29 LAB
INR PPP: 4.2 RATIO
POCT-PROTHROMBIN TIME: 50.6 SECS
QUALITY CONTROL: YES

## 2024-03-29 PROCEDURE — 36416 COLLJ CAPILLARY BLOOD SPEC: CPT

## 2024-03-29 PROCEDURE — 99211 OFF/OP EST MAY X REQ PHY/QHP: CPT

## 2024-03-29 PROCEDURE — 85610 PROTHROMBIN TIME: CPT

## 2024-03-30 DIAGNOSIS — Z79.01 LONG TERM (CURRENT) USE OF ANTICOAGULANTS: ICD-10-CM

## 2024-03-30 DIAGNOSIS — I27.82 CHRONIC PULMONARY EMBOLISM: ICD-10-CM

## 2024-04-02 RX ORDER — LIOTHYRONINE SODIUM 5 UG/1
5 TABLET ORAL
Qty: 90 | Refills: 3 | Status: ACTIVE | COMMUNITY
Start: 2023-06-10 | End: 1900-01-01

## 2024-04-02 RX ORDER — LEVOTHYROXINE SODIUM 0.12 MG/1
125 TABLET ORAL DAILY
Qty: 90 | Refills: 3 | Status: ACTIVE | COMMUNITY
Start: 2020-06-13 | End: 1900-01-01

## 2024-04-09 ENCOUNTER — APPOINTMENT (OUTPATIENT)
Dept: CARDIOLOGY | Facility: CLINIC | Age: 74
End: 2024-04-09
Payer: MEDICARE

## 2024-04-09 VITALS
SYSTOLIC BLOOD PRESSURE: 140 MMHG | HEIGHT: 65 IN | HEART RATE: 72 BPM | BODY MASS INDEX: 35.16 KG/M2 | DIASTOLIC BLOOD PRESSURE: 82 MMHG | WEIGHT: 211 LBS

## 2024-04-09 DIAGNOSIS — K21.9 DIAPHRAGMATIC HERNIA W/OUT OBSTRUCTION OR GANGRENE: ICD-10-CM

## 2024-04-09 DIAGNOSIS — I26.99 OTHER PULMONARY EMBOLISM W/OUT ACUTE COR PULMONALE: ICD-10-CM

## 2024-04-09 DIAGNOSIS — D68.61 ANTIPHOSPHOLIPID SYNDROME: ICD-10-CM

## 2024-04-09 DIAGNOSIS — I83.893 VARICOSE VEINS OF BILATERAL LOWER EXTREMITIES WITH OTHER COMPLICATIONS: ICD-10-CM

## 2024-04-09 DIAGNOSIS — K44.9 DIAPHRAGMATIC HERNIA W/OUT OBSTRUCTION OR GANGRENE: ICD-10-CM

## 2024-04-09 PROCEDURE — 99214 OFFICE O/P EST MOD 30 MIN: CPT

## 2024-04-09 PROCEDURE — 93000 ELECTROCARDIOGRAM COMPLETE: CPT

## 2024-04-09 NOTE — HISTORY OF PRESENT ILLNESS
[FreeTextEntry1] : The patient has marked fatigue when she walks. CTA of the coronary arteries are normal CAC score of 0 . She does have a large hiatal hernia . This may explained by her coughing re GERD . The patient has HTN and states tht her BP has been high at home .

## 2024-04-09 NOTE — CARDIOLOGY SUMMARY
[de-identified] : 7- NSR NS T wave change.  4-9-2024 NSR Normal ECG .  [de-identified] : 6-96-0100SAR of the coronary.  CAC score of 0. No CAD . Large hiatal hernia .  [___] : [unfilled]

## 2024-04-09 NOTE — REASON FOR VISIT
[Symptom and Test Evaluation] : symptom and test evaluation [FreeTextEntry3] : Dr. Orozco  [Consultation] : a consultation regarding [Hypertension] : hypertension [FreeTextEntry1] : S/P PE

## 2024-04-09 NOTE — ASSESSMENT
[FreeTextEntry1] : The patient had a CTA of the coronary arteries . She had no CAD but has a large hiatal hernia . She has HICKS and cough  . Etiology is uncertain but may be from her hiatal hernia . She has scoliosis and large hiatal hernia which may cause some component of restriction the lungs as well . She was given Symbicort by pulmonary  The patient has antiphospholipid syndrome and has had PE not provokable . She is on Coumadin  The patient has had higher BP .  She is not taking her LAsix on a regular basis because it "make her eyes " dry"   Her BP has been running high .  She has had a Intracavity gradient of th eLV in te past aswell.

## 2024-04-12 ENCOUNTER — APPOINTMENT (OUTPATIENT)
Dept: MEDICATION MANAGEMENT | Facility: CLINIC | Age: 74
End: 2024-04-12

## 2024-04-12 ENCOUNTER — OUTPATIENT (OUTPATIENT)
Dept: OUTPATIENT SERVICES | Facility: HOSPITAL | Age: 74
LOS: 1 days | End: 2024-04-12
Payer: MEDICARE

## 2024-04-12 DIAGNOSIS — Z79.01 LONG TERM (CURRENT) USE OF ANTICOAGULANTS: ICD-10-CM

## 2024-04-12 DIAGNOSIS — I27.82 CHRONIC PULMONARY EMBOLISM: ICD-10-CM

## 2024-04-12 LAB
INR PPP: 2.1 RATIO
POCT-PROTHROMBIN TIME: 24.8 SECS
QUALITY CONTROL: YES

## 2024-04-12 PROCEDURE — 99211 OFF/OP EST MAY X REQ PHY/QHP: CPT

## 2024-04-12 PROCEDURE — 85610 PROTHROMBIN TIME: CPT

## 2024-04-12 PROCEDURE — 36416 COLLJ CAPILLARY BLOOD SPEC: CPT

## 2024-04-13 DIAGNOSIS — I27.82 CHRONIC PULMONARY EMBOLISM: ICD-10-CM

## 2024-04-13 DIAGNOSIS — Z79.01 LONG TERM (CURRENT) USE OF ANTICOAGULANTS: ICD-10-CM

## 2024-04-26 ENCOUNTER — APPOINTMENT (OUTPATIENT)
Dept: MEDICATION MANAGEMENT | Facility: CLINIC | Age: 74
End: 2024-04-26

## 2024-04-26 ENCOUNTER — OUTPATIENT (OUTPATIENT)
Dept: OUTPATIENT SERVICES | Facility: HOSPITAL | Age: 74
LOS: 1 days | End: 2024-04-26
Payer: MEDICARE

## 2024-04-26 DIAGNOSIS — I27.82 CHRONIC PULMONARY EMBOLISM: ICD-10-CM

## 2024-04-26 DIAGNOSIS — Z79.01 LONG TERM (CURRENT) USE OF ANTICOAGULANTS: ICD-10-CM

## 2024-04-26 LAB
INR PPP: 2.6 RATIO
POCT-PROTHROMBIN TIME: 31.4 SECS
QUALITY CONTROL: YES

## 2024-04-26 PROCEDURE — 99211 OFF/OP EST MAY X REQ PHY/QHP: CPT

## 2024-04-26 PROCEDURE — 85610 PROTHROMBIN TIME: CPT

## 2024-04-26 PROCEDURE — 36416 COLLJ CAPILLARY BLOOD SPEC: CPT

## 2024-04-27 DIAGNOSIS — Z79.01 LONG TERM (CURRENT) USE OF ANTICOAGULANTS: ICD-10-CM

## 2024-04-27 DIAGNOSIS — I27.82 CHRONIC PULMONARY EMBOLISM: ICD-10-CM

## 2024-04-30 ENCOUNTER — APPOINTMENT (OUTPATIENT)
Dept: CARDIOLOGY | Facility: CLINIC | Age: 74
End: 2024-04-30
Payer: MEDICARE

## 2024-04-30 DIAGNOSIS — I42.2 OTHER HYPERTROPHIC CARDIOMYOPATHY: ICD-10-CM

## 2024-04-30 DIAGNOSIS — R03.0 ELEVATED BLOOD-PRESSURE READING, W/OUT DIAGNOSIS OF HYPERTENSION: ICD-10-CM

## 2024-04-30 DIAGNOSIS — I27.82 CHRONIC PULMONARY EMBOLISM: ICD-10-CM

## 2024-04-30 PROCEDURE — 93306 TTE W/DOPPLER COMPLETE: CPT

## 2024-04-30 PROCEDURE — 93784 AMBL BP MNTR W/SOFTWARE: CPT

## 2024-04-30 PROCEDURE — ZZZZZ: CPT

## 2024-05-02 PROBLEM — I42.2 HYPERTROPHIC CARDIOMYOPATHY: Status: ACTIVE | Noted: 2024-05-02

## 2024-05-02 PROBLEM — I27.82 CHRONIC PULMONARY EMBOLISM: Status: ACTIVE | Noted: 2020-08-19

## 2024-05-07 PROBLEM — R03.0 ELEVATED BLOOD PRESSURE READING: Status: ACTIVE | Noted: 2024-05-07

## 2024-05-14 ENCOUNTER — OUTPATIENT (OUTPATIENT)
Dept: OUTPATIENT SERVICES | Facility: HOSPITAL | Age: 74
LOS: 1 days | End: 2024-05-14
Payer: MEDICARE

## 2024-05-14 ENCOUNTER — APPOINTMENT (OUTPATIENT)
Dept: MEDICATION MANAGEMENT | Facility: CLINIC | Age: 74
End: 2024-05-14

## 2024-05-14 DIAGNOSIS — I27.82 CHRONIC PULMONARY EMBOLISM: ICD-10-CM

## 2024-05-14 DIAGNOSIS — Z79.01 LONG TERM (CURRENT) USE OF ANTICOAGULANTS: ICD-10-CM

## 2024-05-14 LAB
INR PPP: 2.3 RATIO
POCT-PROTHROMBIN TIME: 27.8 SECS
QUALITY CONTROL: YES

## 2024-05-14 PROCEDURE — 85610 PROTHROMBIN TIME: CPT

## 2024-05-14 PROCEDURE — 36416 COLLJ CAPILLARY BLOOD SPEC: CPT

## 2024-05-14 PROCEDURE — 99211 OFF/OP EST MAY X REQ PHY/QHP: CPT

## 2024-05-15 DIAGNOSIS — I27.82 CHRONIC PULMONARY EMBOLISM: ICD-10-CM

## 2024-05-15 DIAGNOSIS — Z79.01 LONG TERM (CURRENT) USE OF ANTICOAGULANTS: ICD-10-CM

## 2024-05-16 PROBLEM — E04.2 MULTINODULAR GOITER: Status: ACTIVE | Noted: 2019-06-08

## 2024-05-16 PROBLEM — E55.9 VITAMIN D DEFICIENCY: Status: ACTIVE | Noted: 2019-05-29

## 2024-05-16 PROBLEM — E06.3 HASHIMOTO'S DISEASE: Status: ACTIVE | Noted: 2019-12-14

## 2024-05-16 PROBLEM — E78.00 HIGH CHOLESTEROL: Status: ACTIVE | Noted: 2019-05-29

## 2024-05-16 PROBLEM — R73.01 IMPAIRED FASTING GLUCOSE: Status: ACTIVE | Noted: 2019-12-14

## 2024-05-16 PROBLEM — I10 HTN (HYPERTENSION): Status: ACTIVE | Noted: 2019-05-29

## 2024-05-16 PROBLEM — E03.9 HYPOTHYROIDISM: Status: ACTIVE | Noted: 2019-05-29

## 2024-05-16 PROBLEM — E53.8 VITAMIN B12 DEFICIENCY: Status: ACTIVE | Noted: 2019-05-29

## 2024-05-18 ENCOUNTER — APPOINTMENT (OUTPATIENT)
Dept: ENDOCRINOLOGY | Facility: CLINIC | Age: 74
End: 2024-05-18
Payer: MEDICARE

## 2024-05-18 VITALS
WEIGHT: 213 LBS | HEART RATE: 84 BPM | BODY MASS INDEX: 35.49 KG/M2 | OXYGEN SATURATION: 95 % | SYSTOLIC BLOOD PRESSURE: 138 MMHG | HEIGHT: 65 IN | DIASTOLIC BLOOD PRESSURE: 82 MMHG

## 2024-05-18 DIAGNOSIS — I10 ESSENTIAL (PRIMARY) HYPERTENSION: ICD-10-CM

## 2024-05-18 DIAGNOSIS — E55.9 VITAMIN D DEFICIENCY, UNSPECIFIED: ICD-10-CM

## 2024-05-18 DIAGNOSIS — E78.00 PURE HYPERCHOLESTEROLEMIA, UNSPECIFIED: ICD-10-CM

## 2024-05-18 DIAGNOSIS — E53.8 DEFICIENCY OF OTHER SPECIFIED B GROUP VITAMINS: ICD-10-CM

## 2024-05-18 DIAGNOSIS — R73.01 IMPAIRED FASTING GLUCOSE: ICD-10-CM

## 2024-05-18 DIAGNOSIS — E06.3 AUTOIMMUNE THYROIDITIS: ICD-10-CM

## 2024-05-18 DIAGNOSIS — E03.9 HYPOTHYROIDISM, UNSPECIFIED: ICD-10-CM

## 2024-05-18 DIAGNOSIS — E04.2 NONTOXIC MULTINODULAR GOITER: ICD-10-CM

## 2024-05-18 PROCEDURE — 99214 OFFICE O/P EST MOD 30 MIN: CPT

## 2024-05-18 RX ORDER — DILTIAZEM HYDROCHLORIDE 360 MG/1
360 CAPSULE, EXTENDED RELEASE ORAL DAILY
Qty: 90 | Refills: 3 | Status: ACTIVE | COMMUNITY
Start: 1900-01-01 | End: 1900-01-01

## 2024-05-18 RX ORDER — ALPRAZOLAM 0.5 MG/1
0.5 TABLET ORAL
Qty: 90 | Refills: 0 | Status: ACTIVE | COMMUNITY
Start: 1900-01-01 | End: 1900-01-01

## 2024-05-18 RX ORDER — FUROSEMIDE 20 MG/1
20 TABLET ORAL DAILY
Qty: 90 | Refills: 3 | Status: ACTIVE | COMMUNITY
Start: 1900-01-01 | End: 1900-01-01

## 2024-05-18 RX ORDER — TIRZEPATIDE 2.5 MG/.5ML
2.5 INJECTION, SOLUTION SUBCUTANEOUS
Qty: 4 | Refills: 11 | Status: ACTIVE | COMMUNITY
Start: 2024-05-18 | End: 1900-01-01

## 2024-05-18 RX ORDER — SEMAGLUTIDE 0.68 MG/ML
2 INJECTION, SOLUTION SUBCUTANEOUS
Qty: 3 | Refills: 3 | Status: DISCONTINUED | COMMUNITY
Start: 2023-06-10 | End: 2024-05-18

## 2024-05-18 RX ORDER — PRAVASTATIN SODIUM 20 MG/1
20 TABLET ORAL
Qty: 90 | Refills: 3 | Status: DISCONTINUED | COMMUNITY
Start: 2024-04-09 | End: 2024-05-18

## 2024-05-18 NOTE — PHYSICAL EXAM
[Alert] : alert [Well Nourished] : well nourished [No Acute Distress] : no acute distress [Well Developed] : well developed [Normal Sclera/Conjunctiva] : normal sclera/conjunctiva [EOMI] : extra ocular movement intact [No Proptosis] : no proptosis [Normal Oropharynx] : the oropharynx was normal [No Respiratory Distress] : no respiratory distress [No Accessory Muscle Use] : no accessory muscle use [Clear to Auscultation] : lungs were clear to auscultation bilaterally [Normal S1, S2] : normal S1 and S2 [Normal Rate] : heart rate was normal [Regular Rhythm] : with a regular rhythm [No Edema] : no peripheral edema [Pedal Pulses Normal] : the pedal pulses are present [Normal Bowel Sounds] : normal bowel sounds [Not Tender] : non-tender [Not Distended] : not distended [Soft] : abdomen soft [Normal Anterior Cervical Nodes] : no anterior cervical lymphadenopathy [No Spinal Tenderness] : no spinal tenderness [Spine Straight] : spine straight [No Stigmata of Cushings Syndrome] : no stigmata of Cushings Syndrome [Normal Gait] : normal gait [Normal Strength/Tone] : muscle strength and tone were normal [No Rash] : no rash [Normal Reflexes] : deep tendon reflexes were 2+ and symmetric [No Tremors] : no tremors [Oriented x3] : oriented to person, place, and time [Acanthosis Nigricans] : no acanthosis nigricans [de-identified] :  nodular thyroid disease. L nodule 3 cm

## 2024-05-18 NOTE — ASSESSMENT
[FreeTextEntry1] : Labs reviewed from 1/22/2024: The previous Heme and pulmonary visits noted. Last note read and appreciated and discussed with the patient.  Pt. has multinodular goiter. We have reviewed ultrasounds (1/23/2024 c/w 6/21/2022 & 6/6/2020) and discussed the potential for hyperfunction and need to monitor for changes that could be consistent with neoplasm. Currently stable nodules (R 9 mm from 6; and L 23mm from 27). Will continue to monitor U/S for stability and function. The patient has a history of hypothyroidism though currently is clinically euthyroid with no hypothyroid or hyperthyroid signs or symptoms. The patient's free T4 was normal at 1.39 with TSH (0.191 from 0.14) which was mildly was suppressed. Risks of subclinical hyperthyroidism (BMD, cardio etc.) and hypothyroidism (fatigue, muscle aches, weight gain etc.)  discussed in detail and given clinical euthyroid state after discussion pt. is comfortable with current dose of 125 mcg levothyroxine.   Pt. has had a low Vit D 23.8--> 28.3->37.9 -> 35. The importance of normal value and need for Vit D supplements of 1000 IU daily was discussed. Lipid levels were reviewed with patient and importance and function of LDL, HDL and triglycerides discussed. Methods to increase HDL (exercise, fish, beans, oatmeal, legumes etc.) discussed with pt. in conjunction with measures to decrease LDL and triglycerides including diet and exercise. The LDL/HDL was 154/52 from 160/60 Current regimen of treatment to continue. Risks/benefits of statins were discussed in detail.  Pt. has Impaired fasting glucose (117-> 114-> 110 -> 97) and HbA1c is 6.0 from 5.7, 5.8 & 5.9.Unable to tolerate ozempic To try Mounjaro 2.5 We have discussed diet/exercise and risks related to diabetes in great detail. Pt with anti-lipid abnormality? acquired. Pt now seeing neurologist.

## 2024-06-04 ENCOUNTER — APPOINTMENT (OUTPATIENT)
Dept: MEDICATION MANAGEMENT | Facility: CLINIC | Age: 74
End: 2024-06-04

## 2024-06-04 ENCOUNTER — OUTPATIENT (OUTPATIENT)
Dept: OUTPATIENT SERVICES | Facility: HOSPITAL | Age: 74
LOS: 1 days | End: 2024-06-04
Payer: MEDICARE

## 2024-06-04 DIAGNOSIS — Z79.01 LONG TERM (CURRENT) USE OF ANTICOAGULANTS: ICD-10-CM

## 2024-06-04 DIAGNOSIS — I27.82 CHRONIC PULMONARY EMBOLISM: ICD-10-CM

## 2024-06-04 LAB
INR PPP: 2.3 RATIO
POCT-PROTHROMBIN TIME: 27.9 SECS
QUALITY CONTROL: YES

## 2024-06-04 PROCEDURE — 99211 OFF/OP EST MAY X REQ PHY/QHP: CPT

## 2024-06-04 PROCEDURE — 85610 PROTHROMBIN TIME: CPT

## 2024-06-04 PROCEDURE — 36416 COLLJ CAPILLARY BLOOD SPEC: CPT

## 2024-06-05 DIAGNOSIS — I27.82 CHRONIC PULMONARY EMBOLISM: ICD-10-CM

## 2024-06-05 DIAGNOSIS — Z79.01 LONG TERM (CURRENT) USE OF ANTICOAGULANTS: ICD-10-CM

## 2024-06-21 ENCOUNTER — APPOINTMENT (OUTPATIENT)
Dept: MEDICATION MANAGEMENT | Facility: CLINIC | Age: 74
End: 2024-06-21

## 2024-06-21 ENCOUNTER — OUTPATIENT (OUTPATIENT)
Dept: OUTPATIENT SERVICES | Facility: HOSPITAL | Age: 74
LOS: 1 days | End: 2024-06-21
Payer: MEDICARE

## 2024-06-21 DIAGNOSIS — Z79.01 LONG TERM (CURRENT) USE OF ANTICOAGULANTS: ICD-10-CM

## 2024-06-21 DIAGNOSIS — I27.82 CHRONIC PULMONARY EMBOLISM: ICD-10-CM

## 2024-06-21 LAB
INR PPP: 2.5 RATIO
POCT-PROTHROMBIN TIME: 30.3 SECS
QUALITY CONTROL: YES

## 2024-06-21 PROCEDURE — 85610 PROTHROMBIN TIME: CPT

## 2024-06-21 PROCEDURE — 36416 COLLJ CAPILLARY BLOOD SPEC: CPT

## 2024-06-21 PROCEDURE — 99211 OFF/OP EST MAY X REQ PHY/QHP: CPT

## 2024-06-22 DIAGNOSIS — I27.82 CHRONIC PULMONARY EMBOLISM: ICD-10-CM

## 2024-06-22 DIAGNOSIS — Z79.01 LONG TERM (CURRENT) USE OF ANTICOAGULANTS: ICD-10-CM

## 2024-07-05 ENCOUNTER — OUTPATIENT (OUTPATIENT)
Dept: OUTPATIENT SERVICES | Facility: HOSPITAL | Age: 74
LOS: 1 days | End: 2024-07-05
Payer: MEDICARE

## 2024-07-05 ENCOUNTER — APPOINTMENT (OUTPATIENT)
Dept: MEDICATION MANAGEMENT | Facility: CLINIC | Age: 74
End: 2024-07-05

## 2024-07-05 DIAGNOSIS — Z79.01 LONG TERM (CURRENT) USE OF ANTICOAGULANTS: ICD-10-CM

## 2024-07-05 DIAGNOSIS — I27.82 CHRONIC PULMONARY EMBOLISM: ICD-10-CM

## 2024-07-05 LAB
INR PPP: 3.7 RATIO
POCT-PROTHROMBIN TIME: 44.1 SECS
QUALITY CONTROL: YES

## 2024-07-05 PROCEDURE — 99211 OFF/OP EST MAY X REQ PHY/QHP: CPT

## 2024-07-05 PROCEDURE — 85610 PROTHROMBIN TIME: CPT

## 2024-07-05 PROCEDURE — 36416 COLLJ CAPILLARY BLOOD SPEC: CPT

## 2024-07-06 DIAGNOSIS — Z79.01 LONG TERM (CURRENT) USE OF ANTICOAGULANTS: ICD-10-CM

## 2024-07-06 DIAGNOSIS — I27.82 CHRONIC PULMONARY EMBOLISM: ICD-10-CM

## 2024-07-19 ENCOUNTER — OUTPATIENT (OUTPATIENT)
Dept: OUTPATIENT SERVICES | Facility: HOSPITAL | Age: 74
LOS: 1 days | End: 2024-07-19
Payer: MEDICARE

## 2024-07-19 ENCOUNTER — APPOINTMENT (OUTPATIENT)
Dept: MEDICATION MANAGEMENT | Facility: CLINIC | Age: 74
End: 2024-07-19

## 2024-07-19 DIAGNOSIS — I27.82 CHRONIC PULMONARY EMBOLISM: ICD-10-CM

## 2024-07-19 DIAGNOSIS — Z79.01 LONG TERM (CURRENT) USE OF ANTICOAGULANTS: ICD-10-CM

## 2024-07-19 PROCEDURE — 99211 OFF/OP EST MAY X REQ PHY/QHP: CPT

## 2024-07-19 PROCEDURE — 36416 COLLJ CAPILLARY BLOOD SPEC: CPT

## 2024-07-19 PROCEDURE — 85610 PROTHROMBIN TIME: CPT

## 2024-07-20 DIAGNOSIS — Z79.01 LONG TERM (CURRENT) USE OF ANTICOAGULANTS: ICD-10-CM

## 2024-07-20 DIAGNOSIS — I27.82 CHRONIC PULMONARY EMBOLISM: ICD-10-CM

## 2024-10-17 NOTE — PATIENT PROFILE ADULT - HAS THE PATIENT EXPERIENCED ANY OF THE FOLLOWING WITHIN THE WEEK PRIOR TO ADMISSION?
New Skin Care Regimen  Soap: Dove sensitive skin bar or Vanicream Bar or Gentle cleanser liquid  Moisturizer: ceraVe or cetaphil cream or Vanicream Cream  Detergent: Tide Free, All Free, or Cheer Free   Fabric softener: do not use  Colognes/Perfumes/Fragrances: do not use  Bathing: shower or bathe with lukewarm water < 10 minutes    
no

## 2024-10-25 ENCOUNTER — APPOINTMENT (OUTPATIENT)
Dept: MEDICATION MANAGEMENT | Facility: CLINIC | Age: 74
End: 2024-10-25

## 2024-10-25 ENCOUNTER — OUTPATIENT (OUTPATIENT)
Dept: OUTPATIENT SERVICES | Facility: HOSPITAL | Age: 74
LOS: 1 days | End: 2024-10-25
Payer: MEDICARE

## 2024-10-25 DIAGNOSIS — Z79.01 LONG TERM (CURRENT) USE OF ANTICOAGULANTS: ICD-10-CM

## 2024-10-25 DIAGNOSIS — I27.82 CHRONIC PULMONARY EMBOLISM: ICD-10-CM

## 2024-10-25 LAB
INR PPP: 2.6 RATIO
POCT-PROTHROMBIN TIME: 31.7 SECS
QUALITY CONTROL: YES

## 2024-10-25 PROCEDURE — 99211 OFF/OP EST MAY X REQ PHY/QHP: CPT

## 2024-10-25 PROCEDURE — 36416 COLLJ CAPILLARY BLOOD SPEC: CPT

## 2024-10-25 PROCEDURE — 85610 PROTHROMBIN TIME: CPT

## 2024-10-26 DIAGNOSIS — I27.82 CHRONIC PULMONARY EMBOLISM: ICD-10-CM

## 2024-10-26 DIAGNOSIS — Z79.01 LONG TERM (CURRENT) USE OF ANTICOAGULANTS: ICD-10-CM

## 2024-11-05 ENCOUNTER — APPOINTMENT (OUTPATIENT)
Dept: CARDIOLOGY | Facility: CLINIC | Age: 74
End: 2024-11-05

## 2024-11-22 ENCOUNTER — APPOINTMENT (OUTPATIENT)
Dept: MEDICATION MANAGEMENT | Facility: CLINIC | Age: 74
End: 2024-11-22

## 2024-11-22 ENCOUNTER — OUTPATIENT (OUTPATIENT)
Dept: OUTPATIENT SERVICES | Facility: HOSPITAL | Age: 74
LOS: 1 days | End: 2024-11-22
Payer: MEDICARE

## 2024-11-22 DIAGNOSIS — I27.82 CHRONIC PULMONARY EMBOLISM: ICD-10-CM

## 2024-11-22 DIAGNOSIS — Z79.01 LONG TERM (CURRENT) USE OF ANTICOAGULANTS: ICD-10-CM

## 2024-11-22 LAB
INR PPP: 2.6 RATIO
POCT-PROTHROMBIN TIME: 31.6 SECS
QUALITY CONTROL: YES

## 2024-11-22 PROCEDURE — 99211 OFF/OP EST MAY X REQ PHY/QHP: CPT

## 2024-11-22 PROCEDURE — 85610 PROTHROMBIN TIME: CPT

## 2024-11-22 PROCEDURE — 36416 COLLJ CAPILLARY BLOOD SPEC: CPT

## 2024-11-23 DIAGNOSIS — Z79.01 LONG TERM (CURRENT) USE OF ANTICOAGULANTS: ICD-10-CM

## 2024-11-23 DIAGNOSIS — I27.82 CHRONIC PULMONARY EMBOLISM: ICD-10-CM

## 2024-12-20 ENCOUNTER — RX RENEWAL (OUTPATIENT)
Age: 74
End: 2024-12-20

## 2024-12-20 RX ORDER — TIRZEPATIDE 2.5 MG/.5ML
2.5 INJECTION, SOLUTION SUBCUTANEOUS
Qty: 4 | Refills: 11 | Status: ACTIVE | COMMUNITY
Start: 2024-12-20 | End: 1900-01-01

## 2024-12-27 ENCOUNTER — OUTPATIENT (OUTPATIENT)
Dept: OUTPATIENT SERVICES | Facility: HOSPITAL | Age: 74
LOS: 1 days | End: 2024-12-27
Payer: MEDICARE

## 2024-12-27 ENCOUNTER — APPOINTMENT (OUTPATIENT)
Dept: MEDICATION MANAGEMENT | Facility: CLINIC | Age: 74
End: 2024-12-27

## 2024-12-27 DIAGNOSIS — I27.82 CHRONIC PULMONARY EMBOLISM: ICD-10-CM

## 2024-12-27 DIAGNOSIS — Z79.01 LONG TERM (CURRENT) USE OF ANTICOAGULANTS: ICD-10-CM

## 2024-12-27 LAB
INR PPP: 3 RATIO
POCT-PROTHROMBIN TIME: 35.8 SECS
QUALITY CONTROL: YES

## 2024-12-27 PROCEDURE — 85610 PROTHROMBIN TIME: CPT

## 2024-12-27 PROCEDURE — 99211 OFF/OP EST MAY X REQ PHY/QHP: CPT

## 2024-12-27 PROCEDURE — 36416 COLLJ CAPILLARY BLOOD SPEC: CPT

## 2024-12-28 DIAGNOSIS — Z79.01 LONG TERM (CURRENT) USE OF ANTICOAGULANTS: ICD-10-CM

## 2024-12-28 DIAGNOSIS — I27.82 CHRONIC PULMONARY EMBOLISM: ICD-10-CM

## 2025-01-04 ENCOUNTER — APPOINTMENT (OUTPATIENT)
Dept: ENDOCRINOLOGY | Facility: CLINIC | Age: 75
End: 2025-01-04
Payer: MEDICARE

## 2025-01-04 VITALS
HEIGHT: 65 IN | OXYGEN SATURATION: 98 % | HEART RATE: 93 BPM | SYSTOLIC BLOOD PRESSURE: 144 MMHG | BODY MASS INDEX: 30.82 KG/M2 | WEIGHT: 185 LBS | DIASTOLIC BLOOD PRESSURE: 80 MMHG

## 2025-01-04 DIAGNOSIS — E06.3 AUTOIMMUNE THYROIDITIS: ICD-10-CM

## 2025-01-04 DIAGNOSIS — I10 ESSENTIAL (PRIMARY) HYPERTENSION: ICD-10-CM

## 2025-01-04 DIAGNOSIS — E04.2 NONTOXIC MULTINODULAR GOITER: ICD-10-CM

## 2025-01-04 DIAGNOSIS — E03.9 HYPOTHYROIDISM, UNSPECIFIED: ICD-10-CM

## 2025-01-04 DIAGNOSIS — R03.0 ELEVATED BLOOD-PRESSURE READING, W/OUT DIAGNOSIS OF HYPERTENSION: ICD-10-CM

## 2025-01-04 DIAGNOSIS — R73.01 IMPAIRED FASTING GLUCOSE: ICD-10-CM

## 2025-01-04 DIAGNOSIS — E53.8 DEFICIENCY OF OTHER SPECIFIED B GROUP VITAMINS: ICD-10-CM

## 2025-01-04 DIAGNOSIS — E78.00 PURE HYPERCHOLESTEROLEMIA, UNSPECIFIED: ICD-10-CM

## 2025-01-04 PROCEDURE — 99214 OFFICE O/P EST MOD 30 MIN: CPT

## 2025-01-18 RX ORDER — LEVOTHYROXINE SODIUM 0.11 MG/1
112 TABLET ORAL
Qty: 45 | Refills: 3 | Status: ACTIVE | COMMUNITY
Start: 2025-01-18 | End: 1900-01-01

## 2025-01-28 ENCOUNTER — OUTPATIENT (OUTPATIENT)
Dept: OUTPATIENT SERVICES | Facility: HOSPITAL | Age: 75
LOS: 1 days | End: 2025-01-28
Payer: MEDICARE

## 2025-01-28 ENCOUNTER — APPOINTMENT (OUTPATIENT)
Dept: MEDICATION MANAGEMENT | Facility: CLINIC | Age: 75
End: 2025-01-28

## 2025-01-28 DIAGNOSIS — Z79.01 LONG TERM (CURRENT) USE OF ANTICOAGULANTS: ICD-10-CM

## 2025-01-28 DIAGNOSIS — I27.82 CHRONIC PULMONARY EMBOLISM: ICD-10-CM

## 2025-01-28 LAB
INR PPP: 2 RATIO
POCT-PROTHROMBIN TIME: 23.5 SECS
QUALITY CONTROL: YES

## 2025-01-28 PROCEDURE — 99211 OFF/OP EST MAY X REQ PHY/QHP: CPT

## 2025-01-28 PROCEDURE — 85610 PROTHROMBIN TIME: CPT

## 2025-01-28 PROCEDURE — 36416 COLLJ CAPILLARY BLOOD SPEC: CPT

## 2025-01-29 DIAGNOSIS — Z79.01 LONG TERM (CURRENT) USE OF ANTICOAGULANTS: ICD-10-CM

## 2025-01-29 DIAGNOSIS — I27.82 CHRONIC PULMONARY EMBOLISM: ICD-10-CM

## 2025-02-04 ENCOUNTER — EMERGENCY (EMERGENCY)
Facility: HOSPITAL | Age: 75
LOS: 0 days | Discharge: HOME | End: 2025-02-04
Attending: EMERGENCY MEDICINE
Payer: MEDICARE

## 2025-02-04 VITALS
TEMPERATURE: 98 F | RESPIRATION RATE: 18 BRPM | DIASTOLIC BLOOD PRESSURE: 88 MMHG | SYSTOLIC BLOOD PRESSURE: 158 MMHG | HEART RATE: 79 BPM | OXYGEN SATURATION: 96 %

## 2025-02-04 VITALS
HEART RATE: 68 BPM | RESPIRATION RATE: 18 BRPM | DIASTOLIC BLOOD PRESSURE: 85 MMHG | TEMPERATURE: 98 F | OXYGEN SATURATION: 96 % | SYSTOLIC BLOOD PRESSURE: 151 MMHG

## 2025-02-04 LAB
ALBUMIN SERPL ELPH-MCNC: 4.2 G/DL — SIGNIFICANT CHANGE UP (ref 3.5–5.2)
ALP SERPL-CCNC: 149 U/L — HIGH (ref 30–115)
ALT FLD-CCNC: 34 U/L — SIGNIFICANT CHANGE UP (ref 0–41)
ANION GAP SERPL CALC-SCNC: 11 MMOL/L — SIGNIFICANT CHANGE UP (ref 7–14)
AST SERPL-CCNC: 68 U/L — HIGH (ref 0–41)
BASOPHILS # BLD AUTO: 0.03 K/UL — SIGNIFICANT CHANGE UP (ref 0–0.2)
BASOPHILS NFR BLD AUTO: 0.3 % — SIGNIFICANT CHANGE UP (ref 0–1)
BILIRUB SERPL-MCNC: 0.4 MG/DL — SIGNIFICANT CHANGE UP (ref 0.2–1.2)
BUN SERPL-MCNC: 14 MG/DL — SIGNIFICANT CHANGE UP (ref 10–20)
CALCIUM SERPL-MCNC: 9.4 MG/DL — SIGNIFICANT CHANGE UP (ref 8.4–10.5)
CHLORIDE SERPL-SCNC: 104 MMOL/L — SIGNIFICANT CHANGE UP (ref 98–110)
CO2 SERPL-SCNC: 23 MMOL/L — SIGNIFICANT CHANGE UP (ref 17–32)
CREAT SERPL-MCNC: 1 MG/DL — SIGNIFICANT CHANGE UP (ref 0.7–1.5)
EGFR: 60 ML/MIN/1.73M2 — SIGNIFICANT CHANGE UP
EOSINOPHIL # BLD AUTO: 0.1 K/UL — SIGNIFICANT CHANGE UP (ref 0–0.7)
EOSINOPHIL NFR BLD AUTO: 1.1 % — SIGNIFICANT CHANGE UP (ref 0–8)
GLUCOSE SERPL-MCNC: 96 MG/DL — SIGNIFICANT CHANGE UP (ref 70–99)
HCT VFR BLD CALC: 43.1 % — SIGNIFICANT CHANGE UP (ref 37–47)
HGB BLD-MCNC: 14.1 G/DL — SIGNIFICANT CHANGE UP (ref 12–16)
IMM GRANULOCYTES NFR BLD AUTO: 0.3 % — SIGNIFICANT CHANGE UP (ref 0.1–0.3)
INR BLD: 1.78 RATIO — HIGH (ref 0.65–1.3)
LYMPHOCYTES # BLD AUTO: 1.55 K/UL — SIGNIFICANT CHANGE UP (ref 1.2–3.4)
LYMPHOCYTES # BLD AUTO: 16.5 % — LOW (ref 20.5–51.1)
MCHC RBC-ENTMCNC: 29.4 PG — SIGNIFICANT CHANGE UP (ref 27–31)
MCHC RBC-ENTMCNC: 32.7 G/DL — SIGNIFICANT CHANGE UP (ref 32–37)
MCV RBC AUTO: 89.8 FL — SIGNIFICANT CHANGE UP (ref 81–99)
MONOCYTES # BLD AUTO: 0.9 K/UL — HIGH (ref 0.1–0.6)
MONOCYTES NFR BLD AUTO: 9.6 % — HIGH (ref 1.7–9.3)
NEUTROPHILS # BLD AUTO: 6.77 K/UL — HIGH (ref 1.4–6.5)
NEUTROPHILS NFR BLD AUTO: 72.2 % — SIGNIFICANT CHANGE UP (ref 42.2–75.2)
NRBC # BLD: 0 /100 WBCS — SIGNIFICANT CHANGE UP (ref 0–0)
NRBC BLD-RTO: 0 /100 WBCS — SIGNIFICANT CHANGE UP (ref 0–0)
PLATELET # BLD AUTO: 271 K/UL — SIGNIFICANT CHANGE UP (ref 130–400)
PMV BLD: 11.2 FL — HIGH (ref 7.4–10.4)
POTASSIUM SERPL-MCNC: 4.3 MMOL/L — SIGNIFICANT CHANGE UP (ref 3.5–5)
POTASSIUM SERPL-SCNC: 4.3 MMOL/L — SIGNIFICANT CHANGE UP (ref 3.5–5)
PROT SERPL-MCNC: 6.5 G/DL — SIGNIFICANT CHANGE UP (ref 6–8)
PROTHROM AB SERPL-ACNC: 21.3 SEC — HIGH (ref 9.95–12.87)
RBC # BLD: 4.8 M/UL — SIGNIFICANT CHANGE UP (ref 4.2–5.4)
RBC # FLD: 13.5 % — SIGNIFICANT CHANGE UP (ref 11.5–14.5)
SODIUM SERPL-SCNC: 138 MMOL/L — SIGNIFICANT CHANGE UP (ref 135–146)
TROPONIN T, HIGH SENSITIVITY RESULT: 11 NG/L — SIGNIFICANT CHANGE UP (ref 6–13)
TROPONIN T, HIGH SENSITIVITY RESULT: 12 NG/L — SIGNIFICANT CHANGE UP (ref 6–13)
WBC # BLD: 9.38 K/UL — SIGNIFICANT CHANGE UP (ref 4.8–10.8)
WBC # FLD AUTO: 9.38 K/UL — SIGNIFICANT CHANGE UP (ref 4.8–10.8)

## 2025-02-04 PROCEDURE — 71045 X-RAY EXAM CHEST 1 VIEW: CPT | Mod: 26

## 2025-02-04 PROCEDURE — 85025 COMPLETE CBC W/AUTO DIFF WBC: CPT

## 2025-02-04 PROCEDURE — 84484 ASSAY OF TROPONIN QUANT: CPT

## 2025-02-04 PROCEDURE — 36000 PLACE NEEDLE IN VEIN: CPT | Mod: XU

## 2025-02-04 PROCEDURE — 93010 ELECTROCARDIOGRAM REPORT: CPT

## 2025-02-04 PROCEDURE — 99285 EMERGENCY DEPT VISIT HI MDM: CPT | Mod: 25

## 2025-02-04 PROCEDURE — 93005 ELECTROCARDIOGRAM TRACING: CPT

## 2025-02-04 PROCEDURE — 85610 PROTHROMBIN TIME: CPT

## 2025-02-04 PROCEDURE — 93308 TTE F-UP OR LMTD: CPT

## 2025-02-04 PROCEDURE — 71045 X-RAY EXAM CHEST 1 VIEW: CPT

## 2025-02-04 PROCEDURE — 99285 EMERGENCY DEPT VISIT HI MDM: CPT | Mod: GC

## 2025-02-04 PROCEDURE — 80053 COMPREHEN METABOLIC PANEL: CPT

## 2025-02-04 PROCEDURE — 36415 COLL VENOUS BLD VENIPUNCTURE: CPT

## 2025-02-04 PROCEDURE — 93308 TTE F-UP OR LMTD: CPT | Mod: 26

## 2025-02-04 RX ORDER — WARFARIN SODIUM 2 MG/1
7.5 TABLET ORAL ONCE
Refills: 0 | Status: DISCONTINUED | OUTPATIENT
Start: 2025-02-04 | End: 2025-02-04

## 2025-02-04 RX ORDER — PANTOPRAZOLE 20 MG/1
1 TABLET, DELAYED RELEASE ORAL
Qty: 30 | Refills: 0
Start: 2025-02-04 | End: 2025-03-05

## 2025-02-04 NOTE — ED PROVIDER NOTE - PHYSICAL EXAMINATION
Constitutional: Well developed, well nourished, no acute distress  Head: Normocephalic, Atraumatic  Eyes: PERRLA, EOMI, conjunctiva and sclera WNL  ENT: Moist mucous membranes, no rhinorrhea,   Neck: Supple, Nontender,    Respiratory: Normal chest excursion with respiration; Breath sounds clear and equal B/L; No wheezes, rales, or rhonchi   Cardiovascular: RRR; Normal S1, S2; No murmurs, rubs or gallops   ABD/GI:  Nondistended; Nontender; No guarding, rigidity or rebound; No CVA tenderness  EXT/MS: Moving all extremities; Distal pulses 2+ B/L; No peripheral edema  Skin: Normal for age and race; Warm and dry; No rash  Neurologic: AAO x 4;   Normal motor and sensory function  Psychiatric: Appropriate affect, normal mood

## 2025-02-04 NOTE — ED PROVIDER NOTE - PATIENT PORTAL LINK FT
You can access the FollowMyHealth Patient Portal offered by [facility] by registering at the following website: [email or web address]. By joining [employer]’s FollowMyHealth portal, you will also be able to view your health information using other applications (apps) compatible with our system.

## 2025-02-04 NOTE — ED PROVIDER NOTE - OBJECTIVE STATEMENT
74-year-old female with past medical history of Hashimoto thyroiditis, hypertension, anemia, antiphospholipid syndrome on warfarin, PE presenting with chest pain.  Patient reports she was eating and had a 4-5 episode of midsternal/epigastric pain that resolved.  Patient denies any radiation of pain.  Patient has history of a hiatal hernia which she is following with GI with no associated surgery for.  Patient endorsing nausea but no vomiting and diaphoresis during the episode.  Patient follows with Dr. Palomo.  Patient states she is currently asymptomatic.  Patient has no other complaints at this time.Patient had a CAD RADS 0 within the past 5 years

## 2025-02-04 NOTE — ED PROVIDER NOTE - CLINICAL SUMMARY MEDICAL DECISION MAKING FREE TEXT BOX
73yo woman h/o hashimoto's, Hypertension, antiphospholipid syndrome, PE on warfarin complains of midsternal/epigastric discomfort that began while she was eating.  No exertional component, no radiation, no associated shortness of breath.  Has some nausea, but no vomiting.  Of note, patient has had extensive cardiac workup, CAD RADS 0 and CCTA within the last several years, follows with Dr. Bernal for cardiology.  Vital signs, exam as noted, patient is nontoxic-appearing, lungs clear, CV S1-S2, abdomen soft, nontender.  EKG is grossly normal and unchanged from prior.  Labs with INR somewhat subtherapeutic at 1.78.  Troponins reassuring at 11 and 12.  POCUS echo grossly normal.  Chest x-ray no free air.  Suspect symptoms are due to the hiatal hernia, doubt cardiac etiology.  Patient to follow-up PMD/GI, continue medical regimen.  She is comfortable going home.

## 2025-02-04 NOTE — ED ADULT NURSE NOTE - CAS EDN DISCHARGE ASSESSMENT
Detail Level: Detailed Detail Level: Generalized Detail Level: Zone Alert and oriented to person, place and time

## 2025-02-04 NOTE — ED PROVIDER NOTE - NSFOLLOWUPINSTRUCTIONS_ED_ALL_ED_FT
Acute Pain, Adult  Acute pain is a type of sudden pain that may last for just a few days or for as long as three months. It is often related to an illness, injury, or a medical procedure. Acute pain may be mild, moderate, or severe.    Pain can make it hard for you to do your daily activities. It can cause anxiety and lead to other problems if it is not treated. Treatment may not take all the pain away, but it may lessen the pain so you can move around and tolerate it.    Pain is best treated with medicines and other therapies such as distraction, meditation, oils from plants (aromatherapy), heat, and ice. Treatment depends on the cause of the pain and how severe it is. Acute pain usually goes away once your injury has healed or you are no longer ill.    Follow these instructions at home:  Medicines    A prescription pill bottle with an example of a pill.  Take over-the-counter and prescription medicines only as told by your health care provider.  Take the lowest dose of medicine for the shortest amount of time needed to relieve the pain.  If you are taking prescription pain medicine:  Do not stop taking the medicine suddenly. Talk to your health care provider about how and when to stop taking prescription medicine.  Do not take more pills than told by your health care provider even if your pain is severe.  Do not take other over-the-counter pain medicines in addition to prescription pain medicine unless told by your health care provider.  Keep your medicine in a safe place, away from children or anyone who could use it in a way that it was not prescribed.  Ask your health care provider if the medicine prescribed to you requires you to avoid driving or using machinery.  Managing pain, stiffness, and swelling    A bag of ice on a towel on the skin.  A heating pad for use on the painful area.   If told, put ice on the affected area.  Put ice in a plastic bag.  Place a towel between your skin and the bag.  Leave the ice on for 20 minutes, 2–3 times a day.  If told, apply heat to the affected area as often as told by your health care provider. Use the heat source that your health care provider recommends, such as a moist heat pack or a heating pad.  Place a towel between your skin and the heat source.  Leave the heat on for 20–30 minutes.  If your skin turns bright red, remove the ice or heat right away to prevent skin damage. The risk of damage is higher if you cannot feel pain, heat, or cold.  Managing constipation    Your medicines may cause constipation. To prevent or treat constipation, you may need to:  Drink enough fluid to keep your urine pale yellow.  Take over-the-counter or prescription medicines.  Eat foods that are high in fiber, such as beans, whole grains, and fresh fruits and vegetables.  Limit foods that are high in fat and processed sugars, such as fried or sweet foods.  Activity    Rest as told by your health care provider.  Return to your normal activities as told by your health care provider. Ask your health care provider what activities are safe for you.  Ask your health care provider if doing physical therapy exercises to improve movement and strength can help you manage your pain.  General instructions    Check your pain level as told by your health care provider.  Ask your health care provider if distraction, relaxation, or aromatherapy can help you manage your pain.  Keep all follow-up visits. Your health care provider will monitor your pain level.  Contact a health care provider if:  Your pain is not controlled by medicine.  Your pain does not improve or gets worse.  You have side effects from pain medicines.  Get help right away if:  You have severe pain.  You have trouble breathing.  You faint, or another person sees you faint.  You have chest pain or pressure that lasts for more than a few minutes, or if you have other symptoms along with chest pain, including:  Pain or discomfort in one or both arms, your back, neck, jaw, or stomach.  Shortness of breath.  A cold sweat.  Nausea.  Feeling light-headed.  These symptoms may be an emergency. Get help right away. Call 911.  Do not wait to see if the symptoms will go away.  Do not drive yourself to the hospital.  This information is not intended to replace advice given to you by your health care provider. Make sure you discuss any questions you have with your health care provider.

## 2025-02-05 NOTE — ED POST DISCHARGE NOTE - RESULT SUMMARY
CXR- L BASILAR OPACITY, RETROCARDIAC OPACITY. SPOKE WITH PATIENT, SHE FEELS WELL TODAY. DISCUSSED FINDINGS, SHE HAS DR. DIOP AS HER PULMO AND WILL F/U WITH HIM.

## 2025-02-13 ENCOUNTER — APPOINTMENT (OUTPATIENT)
Dept: PULMONOLOGY | Facility: CLINIC | Age: 75
End: 2025-02-13
Payer: MEDICARE

## 2025-02-13 VITALS
WEIGHT: 189 LBS | DIASTOLIC BLOOD PRESSURE: 70 MMHG | SYSTOLIC BLOOD PRESSURE: 144 MMHG | HEART RATE: 76 BPM | OXYGEN SATURATION: 96 % | HEIGHT: 65 IN | RESPIRATION RATE: 14 BRPM | BODY MASS INDEX: 31.49 KG/M2

## 2025-02-13 DIAGNOSIS — I26.99 OTHER PULMONARY EMBOLISM W/OUT ACUTE COR PULMONALE: ICD-10-CM

## 2025-02-13 DIAGNOSIS — J45.909 UNSPECIFIED ASTHMA, UNCOMPLICATED: ICD-10-CM

## 2025-02-13 PROCEDURE — 99214 OFFICE O/P EST MOD 30 MIN: CPT

## 2025-02-25 ENCOUNTER — OUTPATIENT (OUTPATIENT)
Dept: OUTPATIENT SERVICES | Facility: HOSPITAL | Age: 75
LOS: 1 days | End: 2025-02-25
Payer: MEDICARE

## 2025-02-25 ENCOUNTER — APPOINTMENT (OUTPATIENT)
Dept: MEDICATION MANAGEMENT | Facility: CLINIC | Age: 75
End: 2025-02-25

## 2025-02-25 DIAGNOSIS — I27.82 CHRONIC PULMONARY EMBOLISM: ICD-10-CM

## 2025-02-25 DIAGNOSIS — Z79.01 LONG TERM (CURRENT) USE OF ANTICOAGULANTS: ICD-10-CM

## 2025-02-25 LAB
INR PPP: 2.2 RATIO
POCT-PROTHROMBIN TIME: 26.2 SECS
QUALITY CONTROL: YES

## 2025-02-25 PROCEDURE — 85610 PROTHROMBIN TIME: CPT

## 2025-02-25 PROCEDURE — 36416 COLLJ CAPILLARY BLOOD SPEC: CPT

## 2025-02-25 PROCEDURE — 99211 OFF/OP EST MAY X REQ PHY/QHP: CPT

## 2025-02-26 DIAGNOSIS — Z79.01 LONG TERM (CURRENT) USE OF ANTICOAGULANTS: ICD-10-CM

## 2025-02-26 DIAGNOSIS — I27.82 CHRONIC PULMONARY EMBOLISM: ICD-10-CM

## 2025-02-27 ENCOUNTER — APPOINTMENT (OUTPATIENT)
Dept: CARDIOLOGY | Facility: CLINIC | Age: 75
End: 2025-02-27
Payer: MEDICARE

## 2025-02-27 ENCOUNTER — NON-APPOINTMENT (OUTPATIENT)
Age: 75
End: 2025-02-27

## 2025-02-27 VITALS — DIASTOLIC BLOOD PRESSURE: 80 MMHG | SYSTOLIC BLOOD PRESSURE: 136 MMHG

## 2025-02-27 VITALS — DIASTOLIC BLOOD PRESSURE: 87 MMHG | HEIGHT: 65 IN | HEART RATE: 67 BPM | SYSTOLIC BLOOD PRESSURE: 144 MMHG

## 2025-02-27 VITALS — BODY MASS INDEX: 30.79 KG/M2 | WEIGHT: 185 LBS

## 2025-02-27 DIAGNOSIS — I10 ESSENTIAL (PRIMARY) HYPERTENSION: ICD-10-CM

## 2025-02-27 DIAGNOSIS — R07.9 CHEST PAIN, UNSPECIFIED: ICD-10-CM

## 2025-02-27 DIAGNOSIS — E03.9 HYPOTHYROIDISM, UNSPECIFIED: ICD-10-CM

## 2025-02-27 DIAGNOSIS — I42.2 OTHER HYPERTROPHIC CARDIOMYOPATHY: ICD-10-CM

## 2025-02-27 DIAGNOSIS — E78.00 PURE HYPERCHOLESTEROLEMIA, UNSPECIFIED: ICD-10-CM

## 2025-02-27 DIAGNOSIS — R73.01 IMPAIRED FASTING GLUCOSE: ICD-10-CM

## 2025-02-27 DIAGNOSIS — I26.99 OTHER PULMONARY EMBOLISM W/OUT ACUTE COR PULMONALE: ICD-10-CM

## 2025-02-27 PROCEDURE — 99214 OFFICE O/P EST MOD 30 MIN: CPT

## 2025-02-27 PROCEDURE — 93000 ELECTROCARDIOGRAM COMPLETE: CPT

## 2025-03-09 ENCOUNTER — OUTPATIENT (OUTPATIENT)
Dept: OUTPATIENT SERVICES | Facility: HOSPITAL | Age: 75
LOS: 1 days | End: 2025-03-09
Payer: MEDICARE

## 2025-03-09 DIAGNOSIS — R91.1 SOLITARY PULMONARY NODULE: ICD-10-CM

## 2025-03-09 DIAGNOSIS — Z00.8 ENCOUNTER FOR OTHER GENERAL EXAMINATION: ICD-10-CM

## 2025-03-09 PROCEDURE — 71250 CT THORAX DX C-: CPT | Mod: 26

## 2025-03-09 PROCEDURE — 71250 CT THORAX DX C-: CPT

## 2025-03-10 DIAGNOSIS — R91.1 SOLITARY PULMONARY NODULE: ICD-10-CM

## 2025-03-11 ENCOUNTER — APPOINTMENT (OUTPATIENT)
Dept: MEDICATION MANAGEMENT | Facility: CLINIC | Age: 75
End: 2025-03-11

## 2025-03-11 ENCOUNTER — OUTPATIENT (OUTPATIENT)
Dept: OUTPATIENT SERVICES | Facility: HOSPITAL | Age: 75
LOS: 1 days | End: 2025-03-11
Payer: MEDICARE

## 2025-03-11 DIAGNOSIS — I27.82 CHRONIC PULMONARY EMBOLISM: ICD-10-CM

## 2025-03-11 DIAGNOSIS — Z79.01 LONG TERM (CURRENT) USE OF ANTICOAGULANTS: ICD-10-CM

## 2025-03-11 LAB
INR PPP: 2.8 RATIO
POCT-PROTHROMBIN TIME: 33.7 SECS
QUALITY CONTROL: YES

## 2025-03-11 PROCEDURE — 36416 COLLJ CAPILLARY BLOOD SPEC: CPT

## 2025-03-11 PROCEDURE — 99211 OFF/OP EST MAY X REQ PHY/QHP: CPT

## 2025-03-11 PROCEDURE — 85610 PROTHROMBIN TIME: CPT

## 2025-03-12 DIAGNOSIS — I27.82 CHRONIC PULMONARY EMBOLISM: ICD-10-CM

## 2025-03-12 DIAGNOSIS — Z79.01 LONG TERM (CURRENT) USE OF ANTICOAGULANTS: ICD-10-CM

## 2025-03-13 ENCOUNTER — APPOINTMENT (OUTPATIENT)
Dept: GASTROENTEROLOGY | Facility: CLINIC | Age: 75
End: 2025-03-13

## 2025-03-13 VITALS — WEIGHT: 190 LBS | HEIGHT: 65 IN | BODY MASS INDEX: 31.65 KG/M2

## 2025-03-13 DIAGNOSIS — R14.3 FLATULENCE: ICD-10-CM

## 2025-03-13 DIAGNOSIS — K21.9 DIAPHRAGMATIC HERNIA W/OUT OBSTRUCTION OR GANGRENE: ICD-10-CM

## 2025-03-13 DIAGNOSIS — K44.9 DIAPHRAGMATIC HERNIA W/OUT OBSTRUCTION OR GANGRENE: ICD-10-CM

## 2025-03-13 PROCEDURE — 99213 OFFICE O/P EST LOW 20 MIN: CPT

## 2025-03-18 ENCOUNTER — APPOINTMENT (OUTPATIENT)
Dept: CARDIOTHORACIC SURGERY | Facility: CLINIC | Age: 75
End: 2025-03-18
Payer: MEDICARE

## 2025-03-18 ENCOUNTER — NON-APPOINTMENT (OUTPATIENT)
Age: 75
End: 2025-03-18

## 2025-03-18 VITALS
DIASTOLIC BLOOD PRESSURE: 84 MMHG | RESPIRATION RATE: 16 BRPM | HEIGHT: 65 IN | SYSTOLIC BLOOD PRESSURE: 152 MMHG | TEMPERATURE: 98.3 F | BODY MASS INDEX: 31.65 KG/M2 | HEART RATE: 75 BPM | WEIGHT: 190 LBS | OXYGEN SATURATION: 94 %

## 2025-03-18 DIAGNOSIS — K44.9 DIAPHRAGMATIC HERNIA W/OUT OBSTRUCTION OR GANGRENE: ICD-10-CM

## 2025-03-18 PROCEDURE — 99205 OFFICE O/P NEW HI 60 MIN: CPT

## 2025-03-21 ENCOUNTER — RESULT REVIEW (OUTPATIENT)
Age: 75
End: 2025-03-21

## 2025-03-21 ENCOUNTER — OUTPATIENT (OUTPATIENT)
Dept: OUTPATIENT SERVICES | Facility: HOSPITAL | Age: 75
LOS: 1 days | End: 2025-03-21
Payer: MEDICARE

## 2025-03-21 DIAGNOSIS — R91.1 SOLITARY PULMONARY NODULE: ICD-10-CM

## 2025-03-21 PROCEDURE — 78815 PET IMAGE W/CT SKULL-THIGH: CPT | Mod: 26,PS

## 2025-03-21 PROCEDURE — 82962 GLUCOSE BLOOD TEST: CPT

## 2025-03-21 PROCEDURE — A9552: CPT

## 2025-03-21 PROCEDURE — 78815 PET IMAGE W/CT SKULL-THIGH: CPT | Mod: MH,PS

## 2025-03-22 DIAGNOSIS — R91.1 SOLITARY PULMONARY NODULE: ICD-10-CM

## 2025-03-25 ENCOUNTER — NON-APPOINTMENT (OUTPATIENT)
Age: 75
End: 2025-03-25

## 2025-03-25 ENCOUNTER — APPOINTMENT (OUTPATIENT)
Dept: CARDIOTHORACIC SURGERY | Facility: CLINIC | Age: 75
End: 2025-03-25

## 2025-03-25 ENCOUNTER — EMERGENCY (EMERGENCY)
Facility: HOSPITAL | Age: 75
LOS: 0 days | Discharge: HOME | End: 2025-03-25
Attending: EMERGENCY MEDICINE
Payer: MEDICARE

## 2025-03-25 VITALS
DIASTOLIC BLOOD PRESSURE: 88 MMHG | OXYGEN SATURATION: 98 % | TEMPERATURE: 98 F | HEART RATE: 97 BPM | RESPIRATION RATE: 18 BRPM | SYSTOLIC BLOOD PRESSURE: 158 MMHG

## 2025-03-25 VITALS
OXYGEN SATURATION: 96 % | HEART RATE: 92 BPM | RESPIRATION RATE: 16 BRPM | TEMPERATURE: 97.8 F | WEIGHT: 190 LBS | BODY MASS INDEX: 31.65 KG/M2 | HEIGHT: 65 IN | DIASTOLIC BLOOD PRESSURE: 87 MMHG | SYSTOLIC BLOOD PRESSURE: 167 MMHG

## 2025-03-25 PROCEDURE — 93005 ELECTROCARDIOGRAM TRACING: CPT

## 2025-03-25 PROCEDURE — 71045 X-RAY EXAM CHEST 1 VIEW: CPT | Mod: 26

## 2025-03-25 PROCEDURE — 99283 EMERGENCY DEPT VISIT LOW MDM: CPT | Mod: 25

## 2025-03-25 PROCEDURE — 71045 X-RAY EXAM CHEST 1 VIEW: CPT

## 2025-03-25 PROCEDURE — 99285 EMERGENCY DEPT VISIT HI MDM: CPT | Mod: GC

## 2025-03-25 PROCEDURE — 93010 ELECTROCARDIOGRAM REPORT: CPT

## 2025-03-25 NOTE — ED PROVIDER NOTE - OBJECTIVE STATEMENT
72-year-old female with significant past medical history of hiatal hernia, antiphospholipid syndrome on warfarin, Hashimoto's, hypertension, anemia, history of PE presents for chest tightness.  She has had multi week long episodes of midsternal/epigastric pain.  She is following with GI and went to an appointment today where she had an acute episode.  They told her to come to the ED for evaluation.  She said that it was midsternal and radiated to the jaw.  She took Gas-X and the pain has completely gone away.  Another episode happened approximately 3 days ago.

## 2025-03-25 NOTE — ED PROVIDER NOTE - CLINICAL SUMMARY MEDICAL DECISION MAKING FREE TEXT BOX
72-year-old woman, history of hiatal hernia, antiphospholipid syndrome on warfarin, hypertension, remote PE was at an appointment with Dr. Jose Mccrcaken regarding her pulmonary nodule and hiatal hernia when she had an episode of epigastric/midsternal chest pain similar to the symptoms she has previously had that led her to this appointment.  She alerted the office staff and was sent to the ED prior to being evaluated by the surgeon.  She took OTC Gas-X and symptoms resolved about 20 minutes later.  She is now asymptomatic and prefers not to have ED workup.  Of note, I saw the patient in February 2025 and did a partial cardiac workup.  Review of her chart revealed normal CCTA in 2021 and we agreed that with the normal EKG today, recent normal POCUS, chest x-ray with no acute findings, that troponins would be likely low yield.  I spoke with Dr. Jose Mccracken as patient is frustrated not to be able to see him today, and he agreed to see her later in the week.  Patient understands to return to the ED if symptoms worsen or cannot be controlled at home and she is comfortable with outpatient follow-up.

## 2025-03-25 NOTE — ED PROVIDER NOTE - PATIENT PORTAL LINK FT
You can access the FollowMyHealth Patient Portal offered by [facility] by registering at the following website: [email or web address]. By joining [employer]’s FollowMyHealth portal, you will also be able to view your health information using other applications (apps) compatible with our system.

## 2025-03-25 NOTE — ED PROVIDER NOTE - PHYSICAL EXAMINATION
Vitals: 158/88  General: NAD, comfortable.   Head: Atraumatic, normocephalic.  Cardio: RRR, no murmurs auscultated. Pedal and radial pulses 2+, equal. Cap refill <2.   Lungs: Good air movement throughout, no distress. LCTAB.   Abdomen: Soft. Bowel sounds present throughout. Nontender.   Extremities: Full AROM. No cyanosis, edema, or rash noted.

## 2025-03-25 NOTE — ED ADULT TRIAGE NOTE - CHIEF COMPLAINT QUOTE
Pt reports epigastric pain to jaw pain. Hx of hiatal hernia. Had episode on Friday states that its " usually gas ". PT has hx of PE on coumadin and cardizem

## 2025-03-25 NOTE — ED ADULT NURSE NOTE - NSSUHOSCREENINGYN_ED_ALL_ED
[de-identified] : Wounds appear slow to heal as expected. No evidence of infection. Yes - the patient is able to be screened

## 2025-03-25 NOTE — ED PROVIDER NOTE - NSFOLLOWUPINSTRUCTIONS_ED_ALL_ED_FT
1, Follow up with Dr Jose Mccracken on Friday  2. Return to the ED immediately for recurrent or worsening symptoms    Chest Pain    Chest pain can be caused by many different conditions which may or may not be dangerous. Causes include heartburn, lung infections, heart attack, blood clot in lungs, skin infections, strain or damage to muscle, cartilage, or bones, etc. In addition to a history and physical examination, an electrocardiogram (ECG) or other lab tests may have been performed to determine the cause of your chest pain. Follow up with your primary care provider or with a cardiologist as instructed.     SEEK IMMEDIATE MEDICAL CARE IF YOU HAVE ANY OF THE FOLLOWING SYMPTOMS: worsening chest pain, coughing up blood, unexplained back/neck/jaw pain, severe abdominal pain, dizziness or lightheadedness, fainting, shortness of breath, sweaty or clammy skin, vomiting, or racing heart beat. These symptoms may represent a serious problem that is an emergency. Do not wait to see if the symptoms will go away. Get medical help right away. Call 911 and do not drive yourself to the hospital.

## 2025-03-25 NOTE — ED PROVIDER NOTE - CARE PROVIDER_API CALL
Michel Boucher  Thoracic and Cardiac Surgery  [address] [place] [address], [address]  [place] [address]  Phone: [phone]  Fax: [phone]  Established Patient  Follow Up Time: Urgent

## 2025-03-25 NOTE — ED ADULT NURSE NOTE - NSFALLUNIVINTERV_ED_ALL_ED
Bed/Stretcher in lowest position, wheels locked, appropriate side rails in place/Call bell, personal items and telephone in reach/Instruct patient to call for assistance before getting out of bed/chair/stretcher/Non-slip footwear applied when patient is off stretcher/[place] to call system/Physically safe environment - no spills, clutter or unnecessary equipment/Purposeful proactive rounding/Room/bathroom lighting operational, light cord in reach

## 2025-03-28 ENCOUNTER — APPOINTMENT (OUTPATIENT)
Dept: CARDIOTHORACIC SURGERY | Facility: CLINIC | Age: 75
End: 2025-03-28
Payer: MEDICARE

## 2025-03-28 VITALS
BODY MASS INDEX: 30.82 KG/M2 | HEIGHT: 65 IN | TEMPERATURE: 97.9 F | HEART RATE: 68 BPM | OXYGEN SATURATION: 95 % | WEIGHT: 185 LBS | RESPIRATION RATE: 16 BRPM | SYSTOLIC BLOOD PRESSURE: 134 MMHG | DIASTOLIC BLOOD PRESSURE: 83 MMHG

## 2025-03-28 DIAGNOSIS — R91.8 OTHER NONSPECIFIC ABNORMAL FINDING OF LUNG FIELD: ICD-10-CM

## 2025-03-28 PROCEDURE — 99215 OFFICE O/P EST HI 40 MIN: CPT

## 2025-03-31 ENCOUNTER — NON-APPOINTMENT (OUTPATIENT)
Age: 75
End: 2025-03-31

## 2025-03-31 ENCOUNTER — APPOINTMENT (OUTPATIENT)
Dept: NEUROSURGERY | Facility: CLINIC | Age: 75
End: 2025-03-31
Payer: MEDICARE

## 2025-03-31 DIAGNOSIS — M54.9 DORSALGIA, UNSPECIFIED: ICD-10-CM

## 2025-03-31 PROCEDURE — 99202 OFFICE O/P NEW SF 15 MIN: CPT

## 2025-04-04 ENCOUNTER — NON-APPOINTMENT (OUTPATIENT)
Age: 75
End: 2025-04-04

## 2025-04-08 ENCOUNTER — RX RENEWAL (OUTPATIENT)
Age: 75
End: 2025-04-08

## 2025-04-08 ENCOUNTER — OUTPATIENT (OUTPATIENT)
Dept: OUTPATIENT SERVICES | Facility: HOSPITAL | Age: 75
LOS: 1 days | End: 2025-04-08
Payer: MEDICARE

## 2025-04-08 ENCOUNTER — APPOINTMENT (OUTPATIENT)
Dept: MEDICATION MANAGEMENT | Facility: CLINIC | Age: 75
End: 2025-04-08

## 2025-04-08 DIAGNOSIS — Z79.01 LONG TERM (CURRENT) USE OF ANTICOAGULANTS: ICD-10-CM

## 2025-04-08 DIAGNOSIS — I27.82 CHRONIC PULMONARY EMBOLISM: ICD-10-CM

## 2025-04-08 LAB
INR PPP: 2.6 RATIO
POCT-PROTHROMBIN TIME: 31.2 SECS
QUALITY CONTROL: YES

## 2025-04-08 PROCEDURE — 85610 PROTHROMBIN TIME: CPT

## 2025-04-08 PROCEDURE — 36416 COLLJ CAPILLARY BLOOD SPEC: CPT

## 2025-04-08 PROCEDURE — 99211 OFF/OP EST MAY X REQ PHY/QHP: CPT

## 2025-04-09 DIAGNOSIS — Z79.01 LONG TERM (CURRENT) USE OF ANTICOAGULANTS: ICD-10-CM

## 2025-04-09 DIAGNOSIS — I27.82 CHRONIC PULMONARY EMBOLISM: ICD-10-CM

## 2025-04-21 ENCOUNTER — RX RENEWAL (OUTPATIENT)
Age: 75
End: 2025-04-21

## 2025-04-28 ENCOUNTER — APPOINTMENT (OUTPATIENT)
Dept: PULMONOLOGY | Facility: CLINIC | Age: 75
End: 2025-04-28
Payer: MEDICARE

## 2025-04-28 VITALS
WEIGHT: 188 LBS | HEART RATE: 71 BPM | SYSTOLIC BLOOD PRESSURE: 140 MMHG | BODY MASS INDEX: 31.32 KG/M2 | OXYGEN SATURATION: 97 % | HEIGHT: 65 IN | RESPIRATION RATE: 15 BRPM | DIASTOLIC BLOOD PRESSURE: 80 MMHG

## 2025-04-28 DIAGNOSIS — R91.8 OTHER NONSPECIFIC ABNORMAL FINDING OF LUNG FIELD: ICD-10-CM

## 2025-04-28 DIAGNOSIS — I26.99 OTHER PULMONARY EMBOLISM W/OUT ACUTE COR PULMONALE: ICD-10-CM

## 2025-04-28 PROCEDURE — G2211 COMPLEX E/M VISIT ADD ON: CPT

## 2025-04-28 PROCEDURE — 99214 OFFICE O/P EST MOD 30 MIN: CPT

## 2025-04-28 RX ORDER — BUDESONIDE AND FORMOTEROL FUMARATE DIHYDRATE 160; 4.5 UG/1; UG/1
160-4.5 AEROSOL RESPIRATORY (INHALATION)
Qty: 1 | Refills: 1 | Status: ACTIVE | COMMUNITY
Start: 2025-04-28 | End: 1900-01-01

## 2025-05-09 ENCOUNTER — APPOINTMENT (OUTPATIENT)
Dept: MEDICATION MANAGEMENT | Facility: CLINIC | Age: 75
End: 2025-05-09

## 2025-05-09 ENCOUNTER — OUTPATIENT (OUTPATIENT)
Dept: OUTPATIENT SERVICES | Facility: HOSPITAL | Age: 75
LOS: 1 days | End: 2025-05-09
Payer: MEDICARE

## 2025-05-09 DIAGNOSIS — Z79.01 LONG TERM (CURRENT) USE OF ANTICOAGULANTS: ICD-10-CM

## 2025-05-09 DIAGNOSIS — I27.82 CHRONIC PULMONARY EMBOLISM: ICD-10-CM

## 2025-05-09 LAB
INR PPP: 2.4 RATIO
POCT-PROTHROMBIN TIME: 28.4 SECS
QUALITY CONTROL: YES

## 2025-05-09 PROCEDURE — 99211 OFF/OP EST MAY X REQ PHY/QHP: CPT

## 2025-05-09 PROCEDURE — 85610 PROTHROMBIN TIME: CPT

## 2025-05-09 PROCEDURE — 36416 COLLJ CAPILLARY BLOOD SPEC: CPT

## 2025-05-10 DIAGNOSIS — Z79.01 LONG TERM (CURRENT) USE OF ANTICOAGULANTS: ICD-10-CM

## 2025-05-10 DIAGNOSIS — I27.82 CHRONIC PULMONARY EMBOLISM: ICD-10-CM

## 2025-06-17 ENCOUNTER — APPOINTMENT (OUTPATIENT)
Dept: MEDICATION MANAGEMENT | Facility: CLINIC | Age: 75
End: 2025-06-17

## 2025-06-17 ENCOUNTER — OUTPATIENT (OUTPATIENT)
Dept: OUTPATIENT SERVICES | Facility: HOSPITAL | Age: 75
LOS: 1 days | End: 2025-06-17
Payer: MEDICARE

## 2025-06-17 ENCOUNTER — RESULT REVIEW (OUTPATIENT)
Age: 75
End: 2025-06-17

## 2025-06-17 DIAGNOSIS — I27.82 CHRONIC PULMONARY EMBOLISM: ICD-10-CM

## 2025-06-17 DIAGNOSIS — R91.8 OTHER NONSPECIFIC ABNORMAL FINDING OF LUNG FIELD: ICD-10-CM

## 2025-06-17 DIAGNOSIS — Z79.01 LONG TERM (CURRENT) USE OF ANTICOAGULANTS: ICD-10-CM

## 2025-06-17 LAB
INR PPP: 2.2 RATIO
POCT-PROTHROMBIN TIME: 25.9 SECS
QUALITY CONTROL: YES

## 2025-06-17 PROCEDURE — 36416 COLLJ CAPILLARY BLOOD SPEC: CPT

## 2025-06-17 PROCEDURE — 71250 CT THORAX DX C-: CPT | Mod: 26

## 2025-06-17 PROCEDURE — 99211 OFF/OP EST MAY X REQ PHY/QHP: CPT

## 2025-06-17 PROCEDURE — 85610 PROTHROMBIN TIME: CPT

## 2025-06-18 ENCOUNTER — NON-APPOINTMENT (OUTPATIENT)
Age: 75
End: 2025-06-18

## 2025-06-18 DIAGNOSIS — Z79.01 LONG TERM (CURRENT) USE OF ANTICOAGULANTS: ICD-10-CM

## 2025-06-18 DIAGNOSIS — I27.82 CHRONIC PULMONARY EMBOLISM: ICD-10-CM

## 2025-06-18 DIAGNOSIS — R91.8 OTHER NONSPECIFIC ABNORMAL FINDING OF LUNG FIELD: ICD-10-CM

## 2025-07-01 ENCOUNTER — APPOINTMENT (OUTPATIENT)
Dept: CARDIOLOGY | Facility: CLINIC | Age: 75
End: 2025-07-01

## 2025-07-11 ENCOUNTER — APPOINTMENT (OUTPATIENT)
Dept: CARDIOLOGY | Facility: CLINIC | Age: 75
End: 2025-07-11

## 2025-07-12 ENCOUNTER — APPOINTMENT (OUTPATIENT)
Dept: ENDOCRINOLOGY | Facility: CLINIC | Age: 75
End: 2025-07-12
Payer: MEDICARE

## 2025-07-12 VITALS
BODY MASS INDEX: 33.15 KG/M2 | OXYGEN SATURATION: 97 % | HEIGHT: 65 IN | WEIGHT: 199 LBS | HEART RATE: 73 BPM | DIASTOLIC BLOOD PRESSURE: 79 MMHG | SYSTOLIC BLOOD PRESSURE: 147 MMHG

## 2025-07-12 PROCEDURE — 99214 OFFICE O/P EST MOD 30 MIN: CPT

## 2025-07-12 RX ORDER — ROSUVASTATIN CALCIUM 5 MG/1
5 TABLET, FILM COATED ORAL DAILY
Qty: 90 | Refills: 3 | Status: ACTIVE | COMMUNITY
Start: 2025-07-12 | End: 1900-01-01

## 2025-07-12 RX ORDER — TIRZEPATIDE 10 MG/.5ML
10 INJECTION, SOLUTION SUBCUTANEOUS
Qty: 2 | Refills: 11 | Status: ACTIVE | COMMUNITY
Start: 2025-07-12 | End: 1900-01-01

## 2025-07-15 ENCOUNTER — APPOINTMENT (OUTPATIENT)
Dept: MEDICATION MANAGEMENT | Facility: CLINIC | Age: 75
End: 2025-07-15

## 2025-07-15 ENCOUNTER — OUTPATIENT (OUTPATIENT)
Dept: OUTPATIENT SERVICES | Facility: HOSPITAL | Age: 75
LOS: 1 days | End: 2025-07-15
Payer: MEDICARE

## 2025-07-15 DIAGNOSIS — Z79.01 LONG TERM (CURRENT) USE OF ANTICOAGULANTS: ICD-10-CM

## 2025-07-15 DIAGNOSIS — I27.82 CHRONIC PULMONARY EMBOLISM: ICD-10-CM

## 2025-07-15 LAB
INR PPP: 3.3 RATIO
POCT-PROTHROMBIN TIME: 39.8 SECS
QUALITY CONTROL: YES

## 2025-07-15 PROCEDURE — 36416 COLLJ CAPILLARY BLOOD SPEC: CPT

## 2025-07-15 PROCEDURE — 99211 OFF/OP EST MAY X REQ PHY/QHP: CPT

## 2025-07-15 PROCEDURE — 85610 PROTHROMBIN TIME: CPT

## 2025-07-16 DIAGNOSIS — I27.82 CHRONIC PULMONARY EMBOLISM: ICD-10-CM

## 2025-07-16 DIAGNOSIS — Z79.01 LONG TERM (CURRENT) USE OF ANTICOAGULANTS: ICD-10-CM

## 2025-07-18 ENCOUNTER — RX RENEWAL (OUTPATIENT)
Age: 75
End: 2025-07-18